# Patient Record
Sex: FEMALE | Race: WHITE | NOT HISPANIC OR LATINO | Employment: FULL TIME | URBAN - METROPOLITAN AREA
[De-identification: names, ages, dates, MRNs, and addresses within clinical notes are randomized per-mention and may not be internally consistent; named-entity substitution may affect disease eponyms.]

---

## 2023-02-10 PROBLEM — R26.81 GAIT INSTABILITY: Status: ACTIVE | Noted: 2023-02-10

## 2023-02-10 PROBLEM — M25.551 RIGHT HIP PAIN: Status: ACTIVE | Noted: 2023-02-10

## 2023-02-10 PROBLEM — M20.5X9 ACQUIRED CLAW TOE: Status: ACTIVE | Noted: 2023-02-10

## 2023-02-10 PROBLEM — E28.39 HYPOESTROGENISM: Status: ACTIVE | Noted: 2023-02-10

## 2023-02-10 PROBLEM — M21.611 HALLUX VALGUS WITH BUNIONS OF RIGHT FOOT: Status: ACTIVE | Noted: 2023-02-10

## 2023-02-10 PROBLEM — E03.9 HYPOTHYROIDISM: Status: ACTIVE | Noted: 2023-02-10

## 2023-02-10 PROBLEM — N39.0 UTI (URINARY TRACT INFECTION): Status: ACTIVE | Noted: 2023-02-10

## 2023-02-10 PROBLEM — M21.079 VALGUS DEFORMITY, NOT ELSEWHERE CLASSIFIED, UNSPECIFIED ANKLE: Status: ACTIVE | Noted: 2023-02-10

## 2023-02-10 PROBLEM — M21.42 PES PLANUS OF LEFT FOOT: Status: ACTIVE | Noted: 2023-02-10

## 2023-02-10 PROBLEM — D64.9 ABSOLUTE ANEMIA: Status: ACTIVE | Noted: 2023-02-10

## 2023-02-10 PROBLEM — L23.7 CONTACT DERMATITIS DUE TO POISON IVY: Status: ACTIVE | Noted: 2023-02-10

## 2023-02-10 PROBLEM — M17.12 PRIMARY OSTEOARTHRITIS OF LEFT KNEE: Status: ACTIVE | Noted: 2023-02-10

## 2023-02-10 PROBLEM — R09.89 DECREASED PEDAL PULSES: Status: ACTIVE | Noted: 2023-02-10

## 2023-02-10 PROBLEM — M21.6X9: Status: ACTIVE | Noted: 2023-02-10

## 2023-02-10 PROBLEM — F51.04 CHRONIC INSOMNIA: Status: ACTIVE | Noted: 2023-02-10

## 2023-02-10 PROBLEM — N39.3 STRESS INCONTINENCE IN FEMALE: Status: ACTIVE | Noted: 2023-02-10

## 2023-02-10 PROBLEM — I10 HYPERTENSION: Status: ACTIVE | Noted: 2023-02-10

## 2023-02-10 PROBLEM — M20.11 HALLUX VALGUS WITH BUNIONS OF RIGHT FOOT: Status: ACTIVE | Noted: 2023-02-10

## 2023-02-10 PROBLEM — M25.572 LEFT ANKLE PAIN: Status: ACTIVE | Noted: 2023-02-10

## 2023-02-10 PROBLEM — M25.562 LEFT KNEE PAIN: Status: ACTIVE | Noted: 2023-02-10

## 2023-02-10 PROBLEM — N90.89 VULVAR IRRITATION: Status: ACTIVE | Noted: 2023-02-10

## 2023-02-10 PROBLEM — E78.5 HYPERLIPIDEMIA: Status: ACTIVE | Noted: 2023-02-10

## 2023-02-10 PROBLEM — R93.1 AGATSTON CAC SCORE, <100: Status: ACTIVE | Noted: 2023-02-10

## 2023-02-10 PROBLEM — N81.4 PROLAPSE, UTEROVAGINAL: Status: ACTIVE | Noted: 2023-02-10

## 2023-02-10 PROBLEM — M21.40 FLAT FOOT: Status: ACTIVE | Noted: 2023-02-10

## 2023-02-10 PROBLEM — M19.079 ARTHRITIS, MIDFOOT: Status: ACTIVE | Noted: 2023-02-10

## 2023-02-10 PROBLEM — N39.46 URGE AND STRESS INCONTINENCE: Status: ACTIVE | Noted: 2023-02-10

## 2023-02-10 PROBLEM — N89.8 VAGINAL EROSION SECONDARY TO PESSARY USE (CMS-HCC): Status: ACTIVE | Noted: 2023-02-10

## 2023-02-10 PROBLEM — M79.672 LEFT FOOT PAIN: Status: ACTIVE | Noted: 2023-02-10

## 2023-02-10 PROBLEM — F32.1 MODERATE SINGLE CURRENT EPISODE OF MAJOR DEPRESSIVE DISORDER (MULTI): Status: ACTIVE | Noted: 2023-02-10

## 2023-02-10 PROBLEM — E55.9 HYPOVITAMINOSIS D: Status: ACTIVE | Noted: 2023-02-10

## 2023-02-10 PROBLEM — T83.89XA VAGINAL EROSION SECONDARY TO PESSARY USE (CMS-HCC): Status: ACTIVE | Noted: 2023-02-10

## 2023-02-10 RX ORDER — LEVOTHYROXINE SODIUM 75 UG/1
75 TABLET ORAL DAILY
COMMUNITY
Start: 2013-11-16 | End: 2023-06-19 | Stop reason: SDUPTHER

## 2023-02-10 RX ORDER — TRAZODONE HYDROCHLORIDE 150 MG/1
150 TABLET ORAL NIGHTLY
COMMUNITY
Start: 2021-12-04 | End: 2023-06-19 | Stop reason: SDUPTHER

## 2023-02-10 RX ORDER — LISINOPRIL 20 MG/1
20 TABLET ORAL DAILY
COMMUNITY
Start: 2013-11-16 | End: 2023-03-07 | Stop reason: DRUGHIGH

## 2023-02-10 RX ORDER — ROSUVASTATIN CALCIUM 20 MG/1
20 TABLET, COATED ORAL NIGHTLY
COMMUNITY
Start: 2019-04-23 | End: 2023-03-07 | Stop reason: SINTOL

## 2023-02-10 RX ORDER — FENOFIBRIC ACID 45 MG/1
45 CAPSULE, DELAYED RELEASE ORAL DAILY
COMMUNITY
Start: 2013-11-16 | End: 2023-07-10 | Stop reason: DRUGHIGH

## 2023-02-22 LAB
ALBUMIN (G/DL) IN SER/PLAS: 4.2 G/DL (ref 3.4–5)
ANION GAP IN SER/PLAS: 11 MMOL/L (ref 10–20)
CALCIUM (MG/DL) IN SER/PLAS: 9.7 MG/DL (ref 8.6–10.6)
CARBON DIOXIDE, TOTAL (MMOL/L) IN SER/PLAS: 29 MMOL/L (ref 21–32)
CHLORIDE (MMOL/L) IN SER/PLAS: 104 MMOL/L (ref 98–107)
CREATININE (MG/DL) IN SER/PLAS: 0.74 MG/DL (ref 0.5–1.05)
GFR FEMALE: 86 ML/MIN/1.73M2
GLUCOSE (MG/DL) IN SER/PLAS: 94 MG/DL (ref 74–99)
PHOSPHATE (MG/DL) IN SER/PLAS: 4.1 MG/DL (ref 2.5–4.9)
POTASSIUM (MMOL/L) IN SER/PLAS: 3.9 MMOL/L (ref 3.5–5.3)
SODIUM (MMOL/L) IN SER/PLAS: 140 MMOL/L (ref 136–145)
UREA NITROGEN (MG/DL) IN SER/PLAS: 19 MG/DL (ref 6–23)

## 2023-03-07 ENCOUNTER — OFFICE VISIT (OUTPATIENT)
Dept: PRIMARY CARE | Facility: CLINIC | Age: 73
End: 2023-03-07
Payer: COMMERCIAL

## 2023-03-07 VITALS
HEIGHT: 65 IN | DIASTOLIC BLOOD PRESSURE: 69 MMHG | OXYGEN SATURATION: 98 % | HEART RATE: 59 BPM | BODY MASS INDEX: 25.18 KG/M2 | SYSTOLIC BLOOD PRESSURE: 146 MMHG

## 2023-03-07 DIAGNOSIS — F32.1 MODERATE SINGLE CURRENT EPISODE OF MAJOR DEPRESSIVE DISORDER (MULTI): ICD-10-CM

## 2023-03-07 DIAGNOSIS — E78.2 MIXED HYPERLIPIDEMIA: ICD-10-CM

## 2023-03-07 DIAGNOSIS — E03.8 OTHER SPECIFIED HYPOTHYROIDISM: ICD-10-CM

## 2023-03-07 DIAGNOSIS — F51.04 CHRONIC INSOMNIA: ICD-10-CM

## 2023-03-07 DIAGNOSIS — M17.12 PRIMARY OSTEOARTHRITIS OF LEFT KNEE: ICD-10-CM

## 2023-03-07 DIAGNOSIS — R93.1 AGATSTON CAC SCORE, <100: Primary | ICD-10-CM

## 2023-03-07 DIAGNOSIS — E55.9 HYPOVITAMINOSIS D: ICD-10-CM

## 2023-03-07 DIAGNOSIS — I10 PRIMARY HYPERTENSION: ICD-10-CM

## 2023-03-07 PROCEDURE — 99214 OFFICE O/P EST MOD 30 MIN: CPT | Performed by: FAMILY MEDICINE

## 2023-03-07 PROCEDURE — 1036F TOBACCO NON-USER: CPT | Performed by: FAMILY MEDICINE

## 2023-03-07 PROCEDURE — 3077F SYST BP >= 140 MM HG: CPT | Performed by: FAMILY MEDICINE

## 2023-03-07 PROCEDURE — 1159F MED LIST DOCD IN RCRD: CPT | Performed by: FAMILY MEDICINE

## 2023-03-07 PROCEDURE — 1160F RVW MEDS BY RX/DR IN RCRD: CPT | Performed by: FAMILY MEDICINE

## 2023-03-07 PROCEDURE — 3078F DIAST BP <80 MM HG: CPT | Performed by: FAMILY MEDICINE

## 2023-03-07 RX ORDER — LOVASTATIN 40 MG/1
40 TABLET ORAL DAILY
Qty: 30 TABLET | Refills: 5 | Status: SHIPPED | OUTPATIENT
Start: 2023-03-07 | End: 2023-04-25 | Stop reason: SINTOL

## 2023-03-07 RX ORDER — LISINOPRIL 40 MG/1
40 TABLET ORAL DAILY
COMMUNITY
End: 2023-05-03 | Stop reason: SDUPTHER

## 2023-03-07 RX ORDER — MELOXICAM 7.5 MG/1
7.5 TABLET ORAL DAILY PRN
Qty: 30 TABLET | Refills: 0 | Status: SHIPPED | OUTPATIENT
Start: 2023-03-07 | End: 2023-04-06

## 2023-03-07 ASSESSMENT — PATIENT HEALTH QUESTIONNAIRE - PHQ9
SUM OF ALL RESPONSES TO PHQ9 QUESTIONS 1 AND 2: 0
1. LITTLE INTEREST OR PLEASURE IN DOING THINGS: NOT AT ALL
2. FEELING DOWN, DEPRESSED OR HOPELESS: NOT AT ALL

## 2023-03-07 ASSESSMENT — ENCOUNTER SYMPTOMS
LOSS OF SENSATION IN FEET: 0
DEPRESSION: 0
OCCASIONAL FEELINGS OF UNSTEADINESS: 0

## 2023-03-07 NOTE — PROGRESS NOTES
"Subjective   Patient ID: Amanda Lovell is a 72 y.o. female who presents for a 1 month follow up for hypertension.     HPI   The patient reports that she has not been taking the increased dose of lisinopril because she got the doses mixed up, this is why her blood pressures are still elevated. She is also taking Trazodone for her insomnia. The patient states that after stopping Rosuvastatin her leg cramps have resolved. She is also taking Levothyroxine for her hypothyroidism. Her iron deficiency anemia and depression are both stable without any medications at this time. She adds that she got a steroid injection 6 weeks ago and this has helped with her left knee pain.    Review of Systems  Constitutional: No fever or chills  Cardiovascular: no chest pain, no palpitations and no syncope.   Respiratory: no cough, no shortness of breath during exertion and no shortness of breath at rest.   Gastrointestinal: no abdominal pain, no nausea and no vomiting.  Neuro: No Headache, no dizziness    Objective   /69 (BP Location: Left arm, Patient Position: Sitting, BP Cuff Size: Adult long)   Pulse 59   Ht 1.638 m (5' 4.5\")   SpO2 98%   BMI 25.18 kg/m²     Physical Exam  Constitutional: Alert and in no acute distress. Well developed, well nourished  Head and Face: Head and face: Normal.    Cardiovascular: Heart rate and rhythm were normal, normal S1 and S2. No peripheral edema.   Pulmonary: No respiratory distress. Clear bilateral breath sounds.  Musculoskeletal: Gait and station: Normal. Muscle strength/tone: Normal.   Skin: Normal skin color and pigmentation, normal skin turgor, and no rash.    Psychiatric: Judgment and insight: Intact. Mood and affect: Normal.    Assessment/Plan   Diagnoses and all orders for this visit:  Agatston CAC score, <100  Primary hypertension  -     Vitamin B12; Future  -     Follow Up In [facility] - PCP; Future  Other specified hypothyroidism  -     CBC; Future  -     TSH with " reflex to Free T4 if abnormal; Future  Mixed hyperlipidemia  -     lovastatin (Mevacor) 40 mg tablet; Take 1 tablet (40 mg) by mouth once daily.  -     Comprehensive Metabolic Panel; Future  -     Lipid Panel; Future  Moderate single current episode of major depressive disorder (CMS/HCC)  Hypovitaminosis D  -     Vitamin D, Total; Future  Chronic insomnia  Primary osteoarthritis of left knee  -     meloxicam (Mobic) 7.5 mg tablet; Take 1 tablet (7.5 mg) by mouth once daily as needed for moderate pain (4 - 6).        Dear Amanda Lovell     It was my pleasure to take care of you today in the office. Below are the things we discussed today:    1. Hypothyroidism: Continue levothyroxine.    2. Hypertension: Poorly controlled. Increase lisinopril to 40 mg.     3. Hyperlipidemia: Start Lovastatin. Stop Rosuvastatin and Continue Fenofibrate    4. Insomnia: Continue Trazodone.     5. Iron deficiency anemia: Stable. We will continue to monitor.    6. Left knee pain: Take Meloxicam for pain as needed. Follow up with ortho    Your yearly Physical is due in: July 2023.  When you call the office for your yearly Physical, please ask them to inform me to order your blood work, so that you can get the fasting blood work before your appointment and we can discuss the results at your physical.      Please call me if any questions arise from now until your next visit. I will call you after I am done seeing patients. A Doctor is always available by phone when the office is closed. Please feel free to call for help with any problem that you feel shouldn't wait until the office re-opens.     Kathryn Dunham MD

## 2023-03-18 ENCOUNTER — PATIENT MESSAGE (OUTPATIENT)
Dept: PRIMARY CARE | Facility: CLINIC | Age: 73
End: 2023-03-18
Payer: COMMERCIAL

## 2023-03-18 DIAGNOSIS — E78.2 MIXED HYPERLIPIDEMIA: Primary | ICD-10-CM

## 2023-03-20 RX ORDER — PRAVASTATIN SODIUM 10 MG/1
10 TABLET ORAL DAILY
Qty: 90 TABLET | Refills: 0 | Status: SHIPPED | OUTPATIENT
Start: 2023-03-20 | End: 2023-04-25 | Stop reason: SINTOL

## 2023-04-24 ENCOUNTER — PATIENT MESSAGE (OUTPATIENT)
Dept: PRIMARY CARE | Facility: CLINIC | Age: 73
End: 2023-04-24
Payer: COMMERCIAL

## 2023-04-24 DIAGNOSIS — E78.2 MIXED HYPERLIPIDEMIA: Primary | ICD-10-CM

## 2023-04-25 RX ORDER — EZETIMIBE 10 MG/1
10 TABLET ORAL DAILY
Qty: 90 TABLET | Refills: 0 | Status: SHIPPED | OUTPATIENT
Start: 2023-04-25 | End: 2023-07-10 | Stop reason: DRUGHIGH

## 2023-05-03 DIAGNOSIS — I10 PRIMARY HYPERTENSION: ICD-10-CM

## 2023-05-03 RX ORDER — LISINOPRIL 40 MG/1
40 TABLET ORAL DAILY
Qty: 90 TABLET | Refills: 1 | Status: SHIPPED | OUTPATIENT
Start: 2023-05-03 | End: 2023-07-10 | Stop reason: SDUPTHER

## 2023-06-05 ENCOUNTER — LAB (OUTPATIENT)
Dept: LAB | Facility: LAB | Age: 73
End: 2023-06-05
Payer: COMMERCIAL

## 2023-06-05 DIAGNOSIS — I10 PRIMARY HYPERTENSION: ICD-10-CM

## 2023-06-05 DIAGNOSIS — E78.2 MIXED HYPERLIPIDEMIA: ICD-10-CM

## 2023-06-05 DIAGNOSIS — E55.9 HYPOVITAMINOSIS D: ICD-10-CM

## 2023-06-05 DIAGNOSIS — E03.8 OTHER SPECIFIED HYPOTHYROIDISM: ICD-10-CM

## 2023-06-05 LAB
ALANINE AMINOTRANSFERASE (SGPT) (U/L) IN SER/PLAS: 14 U/L (ref 7–45)
ALBUMIN (G/DL) IN SER/PLAS: 3.8 G/DL (ref 3.4–5)
ALKALINE PHOSPHATASE (U/L) IN SER/PLAS: 45 U/L (ref 33–136)
ANION GAP IN SER/PLAS: 12 MMOL/L (ref 10–20)
ASPARTATE AMINOTRANSFERASE (SGOT) (U/L) IN SER/PLAS: 20 U/L (ref 9–39)
BILIRUBIN TOTAL (MG/DL) IN SER/PLAS: 0.7 MG/DL (ref 0–1.2)
CALCIDIOL (25 OH VITAMIN D3) (NG/ML) IN SER/PLAS: 50 NG/ML
CALCIUM (MG/DL) IN SER/PLAS: 9 MG/DL (ref 8.6–10.3)
CARBON DIOXIDE, TOTAL (MMOL/L) IN SER/PLAS: 26 MMOL/L (ref 21–32)
CHLORIDE (MMOL/L) IN SER/PLAS: 106 MMOL/L (ref 98–107)
CHOLESTEROL (MG/DL) IN SER/PLAS: 213 MG/DL (ref 0–199)
CHOLESTEROL IN HDL (MG/DL) IN SER/PLAS: 66.8 MG/DL
CHOLESTEROL/HDL RATIO: 3.2
COBALAMIN (VITAMIN B12) (PG/ML) IN SER/PLAS: 577 PG/ML (ref 211–911)
CREATININE (MG/DL) IN SER/PLAS: 0.67 MG/DL (ref 0.5–1.05)
ERYTHROCYTE DISTRIBUTION WIDTH (RATIO) BY AUTOMATED COUNT: 13.2 % (ref 11.5–14.5)
ERYTHROCYTE MEAN CORPUSCULAR HEMOGLOBIN CONCENTRATION (G/DL) BY AUTOMATED: 31.3 G/DL (ref 32–36)
ERYTHROCYTE MEAN CORPUSCULAR VOLUME (FL) BY AUTOMATED COUNT: 87 FL (ref 80–100)
ERYTHROCYTES (10*6/UL) IN BLOOD BY AUTOMATED COUNT: 4.73 X10E12/L (ref 4–5.2)
GFR FEMALE: >90 ML/MIN/1.73M2
GLUCOSE (MG/DL) IN SER/PLAS: 84 MG/DL (ref 74–99)
HEMATOCRIT (%) IN BLOOD BY AUTOMATED COUNT: 41.2 % (ref 36–46)
HEMOGLOBIN (G/DL) IN BLOOD: 12.9 G/DL (ref 12–16)
LDL: 122 MG/DL (ref 0–99)
LEUKOCYTES (10*3/UL) IN BLOOD BY AUTOMATED COUNT: 3.9 X10E9/L (ref 4.4–11.3)
PLATELETS (10*3/UL) IN BLOOD AUTOMATED COUNT: 288 X10E9/L (ref 150–450)
POTASSIUM (MMOL/L) IN SER/PLAS: 3.6 MMOL/L (ref 3.5–5.3)
PROTEIN TOTAL: 6.4 G/DL (ref 6.4–8.2)
SODIUM (MMOL/L) IN SER/PLAS: 140 MMOL/L (ref 136–145)
THYROTROPIN (MIU/L) IN SER/PLAS BY DETECTION LIMIT <= 0.05 MIU/L: 0.5 MIU/L (ref 0.44–3.98)
TRIGLYCERIDE (MG/DL) IN SER/PLAS: 119 MG/DL (ref 0–149)
UREA NITROGEN (MG/DL) IN SER/PLAS: 13 MG/DL (ref 6–23)
VLDL: 24 MG/DL (ref 0–40)

## 2023-06-05 PROCEDURE — 82607 VITAMIN B-12: CPT

## 2023-06-05 PROCEDURE — 85027 COMPLETE CBC AUTOMATED: CPT

## 2023-06-05 PROCEDURE — 36415 COLL VENOUS BLD VENIPUNCTURE: CPT

## 2023-06-05 PROCEDURE — 80053 COMPREHEN METABOLIC PANEL: CPT

## 2023-06-05 PROCEDURE — 80061 LIPID PANEL: CPT

## 2023-06-05 PROCEDURE — 82306 VITAMIN D 25 HYDROXY: CPT

## 2023-06-05 PROCEDURE — 84443 ASSAY THYROID STIM HORMONE: CPT

## 2023-06-19 DIAGNOSIS — E03.8 OTHER SPECIFIED HYPOTHYROIDISM: ICD-10-CM

## 2023-06-19 DIAGNOSIS — F51.04 CHRONIC INSOMNIA: ICD-10-CM

## 2023-06-19 RX ORDER — TRAZODONE HYDROCHLORIDE 150 MG/1
150 TABLET ORAL NIGHTLY
Qty: 30 TABLET | Refills: 0 | Status: SHIPPED | OUTPATIENT
Start: 2023-06-19 | End: 2023-07-10 | Stop reason: SDUPTHER

## 2023-06-19 RX ORDER — LEVOTHYROXINE SODIUM 75 UG/1
75 TABLET ORAL EVERY MORNING
Qty: 30 TABLET | Refills: 0 | Status: SHIPPED | OUTPATIENT
Start: 2023-06-19 | End: 2023-07-10 | Stop reason: SDUPTHER

## 2023-07-10 ENCOUNTER — OFFICE VISIT (OUTPATIENT)
Dept: PRIMARY CARE | Facility: CLINIC | Age: 73
End: 2023-07-10
Payer: COMMERCIAL

## 2023-07-10 VITALS
BODY MASS INDEX: 25.83 KG/M2 | SYSTOLIC BLOOD PRESSURE: 152 MMHG | HEIGHT: 65 IN | HEART RATE: 62 BPM | WEIGHT: 155 LBS | DIASTOLIC BLOOD PRESSURE: 70 MMHG | OXYGEN SATURATION: 99 %

## 2023-07-10 DIAGNOSIS — R93.1 AGATSTON CAC SCORE, <100: ICD-10-CM

## 2023-07-10 DIAGNOSIS — E03.8 OTHER SPECIFIED HYPOTHYROIDISM: ICD-10-CM

## 2023-07-10 DIAGNOSIS — F51.04 CHRONIC INSOMNIA: ICD-10-CM

## 2023-07-10 DIAGNOSIS — Z78.0 ASYMPTOMATIC MENOPAUSAL STATE: ICD-10-CM

## 2023-07-10 DIAGNOSIS — Z00.00 ANNUAL PHYSICAL EXAM: Primary | ICD-10-CM

## 2023-07-10 DIAGNOSIS — Z12.31 ENCOUNTER FOR SCREENING MAMMOGRAM FOR BREAST CANCER: ICD-10-CM

## 2023-07-10 DIAGNOSIS — F32.1 MODERATE SINGLE CURRENT EPISODE OF MAJOR DEPRESSIVE DISORDER (MULTI): ICD-10-CM

## 2023-07-10 DIAGNOSIS — Z23 ENCOUNTER FOR IMMUNIZATION: ICD-10-CM

## 2023-07-10 DIAGNOSIS — I10 PRIMARY HYPERTENSION: ICD-10-CM

## 2023-07-10 DIAGNOSIS — G25.81 RLS (RESTLESS LEGS SYNDROME): ICD-10-CM

## 2023-07-10 DIAGNOSIS — E78.2 MIXED HYPERLIPIDEMIA: ICD-10-CM

## 2023-07-10 PROCEDURE — 3078F DIAST BP <80 MM HG: CPT | Performed by: FAMILY MEDICINE

## 2023-07-10 PROCEDURE — 90677 PCV20 VACCINE IM: CPT | Performed by: FAMILY MEDICINE

## 2023-07-10 PROCEDURE — 1159F MED LIST DOCD IN RCRD: CPT | Performed by: FAMILY MEDICINE

## 2023-07-10 PROCEDURE — 90472 IMMUNIZATION ADMIN EACH ADD: CPT | Performed by: FAMILY MEDICINE

## 2023-07-10 PROCEDURE — 1170F FXNL STATUS ASSESSED: CPT | Performed by: FAMILY MEDICINE

## 2023-07-10 PROCEDURE — 1160F RVW MEDS BY RX/DR IN RCRD: CPT | Performed by: FAMILY MEDICINE

## 2023-07-10 PROCEDURE — 3077F SYST BP >= 140 MM HG: CPT | Performed by: FAMILY MEDICINE

## 2023-07-10 PROCEDURE — 90715 TDAP VACCINE 7 YRS/> IM: CPT | Performed by: FAMILY MEDICINE

## 2023-07-10 PROCEDURE — 90471 IMMUNIZATION ADMIN: CPT | Performed by: FAMILY MEDICINE

## 2023-07-10 PROCEDURE — 99397 PER PM REEVAL EST PAT 65+ YR: CPT | Performed by: FAMILY MEDICINE

## 2023-07-10 PROCEDURE — 1125F AMNT PAIN NOTED PAIN PRSNT: CPT | Performed by: FAMILY MEDICINE

## 2023-07-10 PROCEDURE — 1036F TOBACCO NON-USER: CPT | Performed by: FAMILY MEDICINE

## 2023-07-10 RX ORDER — ROPINIROLE 0.5 MG/1
0.5 TABLET, FILM COATED ORAL NIGHTLY
Qty: 90 TABLET | Refills: 0 | Status: SHIPPED | OUTPATIENT
Start: 2023-07-10 | End: 2023-07-31 | Stop reason: SDUPTHER

## 2023-07-10 RX ORDER — TRAZODONE HYDROCHLORIDE 150 MG/1
150 TABLET ORAL NIGHTLY
Qty: 90 TABLET | Refills: 0 | Status: SHIPPED | OUTPATIENT
Start: 2023-07-10 | End: 2023-07-10

## 2023-07-10 RX ORDER — LISINOPRIL 40 MG/1
40 TABLET ORAL DAILY
Qty: 90 TABLET | Refills: 0 | Status: SHIPPED | OUTPATIENT
Start: 2023-07-10 | End: 2023-07-10

## 2023-07-10 RX ORDER — LEVOTHYROXINE SODIUM 75 UG/1
75 TABLET ORAL EVERY MORNING
Qty: 90 TABLET | Refills: 0 | Status: SHIPPED | OUTPATIENT
Start: 2023-07-10 | End: 2023-07-10

## 2023-07-10 RX ORDER — ROSUVASTATIN CALCIUM 10 MG/1
10 TABLET, COATED ORAL DAILY
Qty: 90 TABLET | Refills: 0 | Status: SHIPPED | OUTPATIENT
Start: 2023-07-10 | End: 2023-07-10

## 2023-07-10 ASSESSMENT — ENCOUNTER SYMPTOMS
OCCASIONAL FEELINGS OF UNSTEADINESS: 0
DEPRESSION: 0
LOSS OF SENSATION IN FEET: 0

## 2023-07-10 ASSESSMENT — COLUMBIA-SUICIDE SEVERITY RATING SCALE - C-SSRS
6. HAVE YOU EVER DONE ANYTHING, STARTED TO DO ANYTHING, OR PREPARED TO DO ANYTHING TO END YOUR LIFE?: NO
2. HAVE YOU ACTUALLY HAD ANY THOUGHTS OF KILLING YOURSELF?: NO
1. IN THE PAST MONTH, HAVE YOU WISHED YOU WERE DEAD OR WISHED YOU COULD GO TO SLEEP AND NOT WAKE UP?: NO

## 2023-07-10 ASSESSMENT — ACTIVITIES OF DAILY LIVING (ADL)
TAKING MEDICATION: INDEPENDENT
PREPARING MEALS: INDEPENDENT
NEEDS ASSISTANCE WITH FOOD: INDEPENDENT
JUDGMENT_ADEQUATE_SAFELY_COMPLETE_DAILY_ACTIVITIES: YES
DRESSING: INDEPENDENT
ADEQUATE_TO_COMPLETE_ADL: YES
BATHING: INDEPENDENT
GROCERY SHOPPING: INDEPENDENT
MANAGING FINANCES: INDEPENDENT
USING TELEPHONE: INDEPENDENT
EATING: INDEPENDENT
FEEDING: INDEPENDENT
USING TRANSPORTATION: INDEPENDENT
TOILETING: INDEPENDENT
DOING HOUSEWORK: INDEPENDENT
PILL BOX USED: YES
STIL DRIVING: YES

## 2023-07-10 ASSESSMENT — PATIENT HEALTH QUESTIONNAIRE - PHQ9
1. LITTLE INTEREST OR PLEASURE IN DOING THINGS: NOT AT ALL
SUM OF ALL RESPONSES TO PHQ9 QUESTIONS 1 AND 2: 0
2. FEELING DOWN, DEPRESSED OR HOPELESS: NOT AT ALL

## 2023-07-10 NOTE — PROGRESS NOTES
"Subjective   Patient ID: Amanda Lovell is a 73 y.o. female who presents for Medicare Annual Wellness Visit Subsequent.      HPI   The patient reports that she is currently taking lisinopril for her hypertension, levothyroxine for hypothyroidism and Trazodone for insomnia. She is taking these medications as prescribed and is tolerating them well. She mentions that she is now taking Zetia and Fenofibrate for her hyperlipidemia and is still experiencing leg pain after switching medications. Her leukopenia is stable at this time and her home blood pressure readings are normally in he 130s systolic.    Review of Systems  Constitutional: No fever or chills, No Night Sweats  Eyes: No Blurry Vision or Eye sight problems  ENT: No Nasal Discharge, Hoarseness, sore throat  Cardiovascular: no chest pain, no palpitations and no syncope.   Respiratory: no cough, no shortness of breath during exertion and no shortness of breath at rest.   Gastrointestinal: no abdominal pain, no nausea and no vomiting.   : No vaginal discharge, burning with urination, no blood in urine or stools  Skin: No Skin rashes or Lesions  Neuro: No Headache, no dizziness or Numbness or tingling  Psych: + sleeping problems. No Anxiety and depression.  Heme: + Easy bleeding and brusing.     Objective   /70   Pulse 62   Ht 1.638 m (5' 4.5\")   Wt 70.3 kg (155 lb)   SpO2 99%   BMI 26.19 kg/m²     Physical Exam  Constitutional: Alert and in no acute distress. Well developed, well nourished.   Head and Face: Head and face: Normal.    Eyes: Normal external exam. Pupils were equal in size, round, reactive to light (PERRL) with normal accommodation and extraocular movements intact (EOMI).   Ears, Nose, Mouth, and Throat: External inspection of ears and nose: Normal.  Hearing: Normal.  Nasal mucosa, septum, and turbinates: Normal.  Lips, teeth, and gums: Normal.  Oropharynx: Normal.   Neck: No neck mass was observed. Supple. Thyroid not enlarged and " there were no palpable thyroid nodules.   Cardiovascular: Heart rate and rhythm were normal, normal S1 and S2. Pedal pulses: Normal. No peripheral edema.   Pulmonary: No respiratory distress. Clear bilateral breath sounds.   Abdomen: Soft nontender; no abdominal mass palpated. Normal bowel sounds. No organomegaly.   Musculoskeletal: No joint swelling seen, normal movements of all extremities. Range of motion: Normal.  Muscle strength/tone: Normal.    Skin: Normal skin color and pigmentation, normal skin turgor, and no rash.   Neurologic: Deep tendon reflexes were 2+ and symmetric.   Psychiatric: Judgment and insight: Intact. Mood and affect: Normal.  Lymphatic: No cervical lymphadenopathy. Palpation of lymph nodes in axillae: Normal.  Palpation of lymph nodes in groin: Normal.    Lab Results   Component Value Date    WBC 3.9 (L) 06/05/2023    HGB 12.9 06/05/2023    HCT 41.2 06/05/2023     06/05/2023    CHOL 213 (H) 06/05/2023    TRIG 119 06/05/2023    HDL 66.8 06/05/2023    ALT 14 06/05/2023    AST 20 06/05/2023     06/05/2023    K 3.6 06/05/2023     06/05/2023    CREATININE 0.67 06/05/2023    BUN 13 06/05/2023    CO2 26 06/05/2023    TSH 0.50 06/05/2023    INR 1.0 05/16/2018       XR foot  Narrative: Interpreted By:  ETIENNE MAY MD  MRN: [id number]  Patient Name: MARICHUY BRISENO     STUDY:  FOOT; COMPLETE, MIN 3 VIEWS;  6/13/2023 2:40 pm     INDICATION:  pain  M25.572: Left ankle pain, unspecified chronicity M79.672: Left  foot pain.     COMPARISON:  09/27/2020.     ACCESSION NUMBER(S):  [id number]     ORDERING CLINICIAN:  SHAINA ST     FINDINGS:  Left foot, three views     There is moderate joint space narrowing with osteophytosis and  sclerosis in the ankle joint. Remote medial malleolar avulsion injury  present. Remote avulsion fracture at the dorsal aspect of the midfoot  likely arising from the q.day forms. There is severe midfoot  osteoarthritis as well. There is pes planus  deformity. There is no  acute fracture or dislocation.     There is subluxation/dislocation of the 5th PIP joint, similar to the  prior     Impression: Severe pes planus deformity. Severe midfoot and moderate ankle  osteoarthritis. Remote avulsion fracture at the dorsal aspect of the  midfoot. Redemonstration of subluxation/dislocated of the 5th  proximal interphalangeal joint  XR ankle  Narrative: Interpreted By:  ETIENNE MAY MD  MRN: [id number]  Patient Name: MARICHUY BRISENO     STUDY:  ANKLE, COMPLETE, MIN 3 VIEWS;  6/13/2023 2:40 pm     INDICATION:  pain  M25.572: Left ankle pain, unspecified chronicity M79.672: Left  foot pain.     COMPARISON:  09/27/2022     ACCESSION NUMBER(S):  [id number]     ORDERING CLINICIAN:  SHAINA ST     FINDINGS:  Left ankle, three views     There is moderate joint space narrowing with osteophytosis and  sclerosis in the ankle joint. Remote medial malleolar avulsion injury  present. Remote avulsion fracture at the dorsal aspect of the midfoot  likely arising from the q.day forms. There is severe midfoot  osteoarthritis as well. There is pes planus deformity. There is no  acute fracture or dislocation. Subluxation is present at the 4th and  5th interphalangeal joints however partially visualized with this  study.     Impression:    Severe pes planus deformity. Severe midfoot and moderate ankle  osteoarthritis. Remote avulsion fracture at the dorsal aspect of the  midfoot. No acute abnormality seen      Assessment/Plan   Diagnoses and all orders for this visit:  Annual physical exam  Primary hypertension  -     Follow Up In [facility] - PCP  -     lisinopril 40 mg tablet; Take 1 tablet (40 mg) by mouth once daily.  Asymptomatic menopausal state  -     XR DEXA bone density; Future  Encounter for screening mammogram for breast cancer  -     BI mammo bilateral screening tomosynthesis; Future  Agatston CAC score, <100  Mixed hyperlipidemia  -     rosuvastatin (Crestor)  10 mg tablet; Take 1 tablet (10 mg) by mouth once daily.  -     Follow Up In [facility] - PCP - Established; Future  Other specified hypothyroidism  -     levothyroxine (Synthroid, Levoxyl) 75 mcg tablet; Take 1 tablet (75 mcg) by mouth once daily in the morning.  Chronic insomnia  -     traZODone (Desyrel) 150 mg tablet; Take 1 tablet (150 mg) by mouth once daily at bedtime.  Moderate single current episode of major depressive disorder (CMS/HCC)  RLS (restless legs syndrome)  -     rOPINIRole (Requip) 0.5 mg tablet; Take 1 tablet (0.5 mg) by mouth once daily at bedtime.        Dear Amanda Lovell     It was my pleasure to take care of you today in the office. Below are the things we discussed today:    1. 1. Immunizations: Yearly Flu shot is recommended. Please get in the fall         a: COVID: Please get booster in the fall          b: Tetanus: Given today          c: Shingrix: Up-to-date         d: Pneumovax: Up-to-date         e: Prevnar: Given today     2. Blood Work: Reviewed   3. Seen your dentist twice a year  4. Yearly Eye exam is recommended    5. BMI: Overweight  6: Diet recommendations:   Eat Clean, Try to have as many home cooked meals as possible  Avoid processed foods which contain excess calories, sugar, and sodium.    7. Exercise recommendations:   150 minutes a week to maintain your weight     If you have to loose weight, you need a better diet and exercise plan.     8. Supplements recommended:  a - Calcium 600 mg up to twice a day to get a total of 1200 mg. Each 8 oz of milk or yogurt or 1 oz of cheese, 1 Banana, 1 serving of green Leafy vegetable has about 300 mg of Calcium, so you may subtract that amount. Calcium citrate is the only acceptable supplement to take if you take an acid suppressing medication like Prilosec; otherwise Calcium carbonate is acceptable too (It can cause Constipation).   b - Vitamin D - 2000 IU daily     9. Please get your Living will / Advance directive  completed if you do not have one already. Please make sure our office has a copy of the latest one.     10. Colonoscopy: Uptodate. Last done in Oct 2021, repeat in Oct 2026   11. Mammogram: Uptodate, ordered for Dec 2023   12. PAP: Not indicated   13. DEXA: Up-to-date. Ordered   14: Skin Check: Please see Dermatology once a year for a Skin Check.     15. Hypertension: Continue lisinopril. Advised the patient to check ambulatory blood pressures at home at bed time and bring in a log of readings to her next visit.    16. Hypothyroidism: Continue Levothyroxine.     17. Insomnia: Continue Trazodone.    18. Hyperlipidemia: Stop Zetia and Fenofibrate. Advised the patient that if she is still experiencing pain after stopping medications she will start Requip for restless leg syndrome and restart Rosuvastatin. Start Requip, take 1/2 pill at nights.    19. Leukopenia: Stable.     Follow up in 3 months.     Follow up in one year for a Physical. Please call the office before your Physical to see if you need blood work completed prior to your physical.     Please call me if any questions arise from now until your next visit. I will call you after I am done seeing patients. A Doctor is always available by phone when the office is closed. Please feel free to call for help with any problem that you feel shouldn't wait until the office re-opens.     Scribe Attestation  By signing my name below, Miranda WOLFE Scribe   attest that this documentation has been prepared under the direction and in the presence of Kathryn Dunham MD.

## 2023-07-31 ENCOUNTER — PATIENT MESSAGE (OUTPATIENT)
Dept: PRIMARY CARE | Facility: CLINIC | Age: 73
End: 2023-07-31
Payer: COMMERCIAL

## 2023-07-31 DIAGNOSIS — G25.81 RLS (RESTLESS LEGS SYNDROME): ICD-10-CM

## 2023-07-31 RX ORDER — ROPINIROLE 1 MG/1
1 TABLET, FILM COATED ORAL NIGHTLY
Qty: 90 TABLET | Refills: 0 | Status: SHIPPED | OUTPATIENT
Start: 2023-07-31 | End: 2023-07-31

## 2023-08-23 DIAGNOSIS — E78.2 MIXED HYPERLIPIDEMIA: ICD-10-CM

## 2023-08-23 RX ORDER — FENOFIBRIC ACID 45 MG/1
1 CAPSULE, DELAYED RELEASE ORAL DAILY
COMMUNITY
Start: 2013-11-16 | End: 2023-08-23 | Stop reason: SDUPTHER

## 2023-08-23 RX ORDER — FENOFIBRATE 48 MG/1
48 TABLET, FILM COATED ORAL
COMMUNITY
End: 2023-08-23 | Stop reason: SDUPTHER

## 2023-08-24 RX ORDER — FENOFIBRIC ACID 45 MG/1
45 CAPSULE, DELAYED RELEASE ORAL DAILY
Qty: 90 CAPSULE | Refills: 0 | Status: SHIPPED | OUTPATIENT
Start: 2023-08-24 | End: 2023-08-24

## 2023-09-13 ENCOUNTER — HOSPITAL ENCOUNTER (OUTPATIENT)
Dept: DATA CONVERSION | Facility: HOSPITAL | Age: 73
End: 2023-09-13

## 2023-09-13 DIAGNOSIS — Z12.31 ENCOUNTER FOR SCREENING MAMMOGRAM FOR MALIGNANT NEOPLASM OF BREAST: ICD-10-CM

## 2023-10-10 ENCOUNTER — OFFICE VISIT (OUTPATIENT)
Dept: ORTHOPEDIC SURGERY | Facility: CLINIC | Age: 73
End: 2023-10-10
Payer: COMMERCIAL

## 2023-10-10 ENCOUNTER — APPOINTMENT (OUTPATIENT)
Dept: PRIMARY CARE | Facility: CLINIC | Age: 73
End: 2023-10-10
Payer: COMMERCIAL

## 2023-10-10 ENCOUNTER — APPOINTMENT (OUTPATIENT)
Dept: ORTHOPEDIC SURGERY | Facility: CLINIC | Age: 73
End: 2023-10-10
Payer: COMMERCIAL

## 2023-10-10 DIAGNOSIS — M20.5X2 ACQUIRED CLAW TOE OF LEFT FOOT: Primary | ICD-10-CM

## 2023-10-10 DIAGNOSIS — M20.22 ACQUIRED HALLUX RIGIDUS OF LEFT FOOT: ICD-10-CM

## 2023-10-10 DIAGNOSIS — M21.6X2 ACQUIRED PRONATION DEFORMITY OF FOOT, LEFT: ICD-10-CM

## 2023-10-10 PROCEDURE — 1159F MED LIST DOCD IN RCRD: CPT | Performed by: STUDENT IN AN ORGANIZED HEALTH CARE EDUCATION/TRAINING PROGRAM

## 2023-10-10 PROCEDURE — 99212 OFFICE O/P EST SF 10 MIN: CPT | Performed by: STUDENT IN AN ORGANIZED HEALTH CARE EDUCATION/TRAINING PROGRAM

## 2023-10-10 PROCEDURE — 1160F RVW MEDS BY RX/DR IN RCRD: CPT | Performed by: STUDENT IN AN ORGANIZED HEALTH CARE EDUCATION/TRAINING PROGRAM

## 2023-10-10 PROCEDURE — 1036F TOBACCO NON-USER: CPT | Performed by: STUDENT IN AN ORGANIZED HEALTH CARE EDUCATION/TRAINING PROGRAM

## 2023-10-10 PROCEDURE — 1125F AMNT PAIN NOTED PAIN PRSNT: CPT | Performed by: STUDENT IN AN ORGANIZED HEALTH CARE EDUCATION/TRAINING PROGRAM

## 2023-10-10 ASSESSMENT — PAIN SCALES - GENERAL: PAINLEVEL_OUTOF10: 3

## 2023-10-10 ASSESSMENT — PAIN DESCRIPTION - DESCRIPTORS: DESCRIPTORS: BURNING

## 2023-10-10 ASSESSMENT — PAIN - FUNCTIONAL ASSESSMENT: PAIN_FUNCTIONAL_ASSESSMENT: 0-10

## 2023-10-10 NOTE — PROGRESS NOTES
ORTHOPAEDIC SURGERY OUTPATIENT PROGRESS NOTE    Chief Complaint:  Left foot pain    History Of Present Illness  73-year-old female well-known to me at this time with a history of complex bilateral lower extremity foot deformities including mild left valgus tibiotalar osteoarthritis wear pattern with pes planus foot posture, pronation and abduction deformity of the foot with 2 through 5 claw toe deformities with hallux rigidus.  Patient has been symptomatically managing the majority of her symptoms with combination of  and toe sleeves.  Recently she has had increased burning pain, 3 out of 10 on the dorsum of her claw toes.  She has been using wool in her socks to offload  her forefoot pain with some relief.  She has continued hiking and walking and is overall quite satisfied with her current level of symptoms.    She would like to continue to avoid surgery.     Past Medical History  Past Medical History:   Diagnosis Date    Encounter for fitting and adjustment of other specified devices 07/22/2018    Pessary maintenance    Encounter for general adult medical examination without abnormal findings 06/14/2021    Preventative health care    Other abnormal glucose 12/16/2016    Elevated glucose    Other specified complication of genitourinary prosthetic devices, implants and grafts, initial encounter (CMS/[facility]) 07/22/2018    Vaginal erosion secondary to pessary use, initial encounter    Pain in right foot 11/06/2017    Foot pain, bilateral    Pain in unspecified foot 06/30/2021    Foot pain    Personal history of other diseases of the circulatory system 11/18/2016    History of abnormal electrocardiography    Personal history of other diseases of the female genital tract 06/04/2020    History of uterine prolapse    Personal history of other diseases of the female genital tract 05/12/2016    History of postmenopausal bleeding    Personal history of other diseases of the nervous system and sense organs 03/22/2017     History of bacterial conjunctivitis    Personal history of other diseases of the respiratory system 11/19/2021    History of sore throat    Personal history of other diseases of urinary system 07/26/2018    History of hematuria    Personal history of other endocrine, nutritional and metabolic disease     History of hypothyroidism    Personal history of other specified conditions 02/21/2019    History of dysuria    Personal history of urinary (tract) infections 02/21/2019    History of urinary tract infection    Postmenopausal atrophic vaginitis 06/14/2021    Vaginal atrophy    Urge incontinence 06/11/2019    Urge incontinence       Surgical History  Recent Surgeries in Orthopaedic Surgery            No cases to display             Social History  Social History     Socioeconomic History    Marital status:      Spouse name: Not on file    Number of children: 2    Years of education: Not on file    Highest education level: Not on file   Occupational History    Not on file   Tobacco Use    Smoking status: Never    Smokeless tobacco: Never   Substance and Sexual Activity    Alcohol use: Never    Drug use: Never    Sexual activity: Not Currently   Other Topics Concern    Not on file   Social History Narrative    Not on file     Social Determinants of Health     Financial Resource Strain: Not on file   Food Insecurity: Not on file   Transportation Needs: Not on file   Physical Activity: Not on file   Stress: Not on file   Social Connections: Not on file   Intimate Partner Violence: Not on file   Housing Stability: Not on file       Family History  Family History   Problem Relation Name Age of Onset    Other (hamman  rich disease) Mother      Pancreatic cancer Father      Hyperlipidemia Father      Hypertension Father      Diabetes Other paternal aunt     Other (neoplasm of breast) Cousin          Allergies  No Known Allergies    Review of Systems  REVIEW OF SYSTEMS  Constitutional: no unplanned weight  loss  Psychiatric: no suicidal ideation  ENT: no vision changes, no sinus problems  Pulmonary: no shortness of breath during office visit today  Lymphatic: no enlarged lymph nodes  Cardiovascular: no chest pain or shortness of breath during office visit today  Gastrointestinal: no stomach problems  Genitourinary: no dysuria   Skin: no rashes  Endocrine: no thyroid problems  Neurological: no headache, no numbness  Hematological: no easy bruising  Musculoskeletal: Left foot pain     Physical Exam  PHYSICAL EXAMINATION  Constitutional Exam: well developed and well nourished  Psychiatric Exam: alert and oriented, appropriate mood and behavior  Eye Exam: EOMI  Pulmonary Exam: breathing non-labored, no apparent distress  Lymphatic exam: no appreciable lymphadenopathy in the lower extremities  Cardiovascular exam: RRR to peripheral palpation, DP pulses 2+, PT 2+, toes are pink with good capillary refill, no pitting edema  Skin exam: no open lesions, rashes, abrasions or ulcerations  Neurological exam: sensation to light touch intact in both lower extremities in peripheral and dermatomal distributions (except for any abnormalities noted in musculoskeletal exam)    Musculoskeletal exam: Left lower extremity examination.  Patient has obvious 2 through 5 rigid claw toe deformities with dorsal callus at the PIPJ.  She has a prominent plantar callus underlying the medial column, nontender to palpation.  There is no obvious ulceration or erythema.  She is focally tender to palpation about the dorsum of the 2 through 5 toes.  Patient has mild anterolateral ankle effusion.  She has mild hyperextension deformity of 2 through 5 claw toes.  Patient is pain-free and supple ankle ROM from approximate 10 degrees shy of neutral dorsiflexion to 40 degrees of plantarflexion.  Patient has restricted but painless subtalar joint ROM.  Midtarsal joint ROM is supple and pain-free, able to correct her abduction deformity in the coronal plane.   Patient has greater than physiologic hindfoot valgus with pes planus arch posture and forefoot deformities as described above.  Patient has sensation intact to light touch grossly in saphenous, sural, superficial peroneal, deep peroneal and tibial nerve distributions.  Patient has 5 out of 5 strength in plantarflexion, dorsiflexion and EHL.  She has 2+ DP/PT pulse palpated.     Last Recorded Vitals  There were no vitals taken for this visit.    Laboratory Results  No results found for this or any previous visit (from the past 24 hour(s)).     Radiology Results  No new imaging at this visit.    Assessment/Plan   73-year-old female who in my impression has left progressively collapsing foot deformity with mild left tibiotalar osteoarthritis and pes planus foot posture, pronation and abduction deformity of the foot with prominent 2 through 5 rigid claw toe deformities and hallux rigidus.  I have reviewed the diagnosis and treatment options extensively with the patient.  The patient reports that she is managing her symptoms well and does not wish to consider surgical management for this condition.  I certainly think this is reasonable as long as she does not have any skin ulceration or breakdown.  I reviewed with the patient that if the bad days begin to outnumber the good that she should consider surgical treatment for her claw toes and she may need correction of her PCFD as well.  I will plan to see the patient back on an as-needed basis.  I encouraged the patient to contact the office if she develops any new pain, worsening or changing symptoms or if she has any further questions.  Upon return, patient require 3 views weightbearing left foot and ankle with Sonido hindfoot alignment view.    Jermaine Rivas MD, BHARGAVI  Department of Orthopaedic Surgery  [facility]    The diagnosis and treatment plan were reviewed with the patient. All questions were answered. The patient  verbalized understanding of the treatment plan. There were no barriers to understanding identified.    Note dictated with [employer] software.  Completed without full type editing and sent to avoid delay.    eformities including mild left valgus tibiotalar osteoarthritis wear pattern with pes planus foot posture, pronation and abduction deformity of the foot with 2 through 5 claw toe deformities with hallux rigidus.

## 2023-10-23 DIAGNOSIS — E78.2 MIXED HYPERLIPIDEMIA: ICD-10-CM

## 2023-10-23 DIAGNOSIS — E03.8 OTHER SPECIFIED HYPOTHYROIDISM: ICD-10-CM

## 2023-10-23 DIAGNOSIS — I10 PRIMARY HYPERTENSION: ICD-10-CM

## 2023-10-23 DIAGNOSIS — F51.04 CHRONIC INSOMNIA: ICD-10-CM

## 2023-10-23 DIAGNOSIS — G25.81 RLS (RESTLESS LEGS SYNDROME): ICD-10-CM

## 2023-10-23 RX ORDER — LISINOPRIL 40 MG/1
40 TABLET ORAL DAILY
Qty: 30 TABLET | Refills: 0 | Status: SHIPPED | OUTPATIENT
Start: 2023-10-23 | End: 2023-11-13

## 2023-10-23 RX ORDER — TRAZODONE HYDROCHLORIDE 150 MG/1
150 TABLET ORAL NIGHTLY
Qty: 30 TABLET | Refills: 0 | Status: SHIPPED | OUTPATIENT
Start: 2023-10-23 | End: 2023-11-13 | Stop reason: SDUPTHER

## 2023-10-23 RX ORDER — LEVOTHYROXINE SODIUM 75 UG/1
75 TABLET ORAL EVERY MORNING
Qty: 30 TABLET | Refills: 0 | Status: SHIPPED | OUTPATIENT
Start: 2023-10-23 | End: 2023-11-13 | Stop reason: SDUPTHER

## 2023-10-23 RX ORDER — ROPINIROLE 1 MG/1
1 TABLET, FILM COATED ORAL NIGHTLY
Qty: 30 TABLET | Refills: 0 | Status: SHIPPED | OUTPATIENT
Start: 2023-10-23 | End: 2023-11-13 | Stop reason: SDUPTHER

## 2023-10-23 RX ORDER — ROSUVASTATIN CALCIUM 10 MG/1
10 TABLET, COATED ORAL DAILY
Qty: 30 TABLET | Refills: 0 | Status: SHIPPED | OUTPATIENT
Start: 2023-10-23 | End: 2023-11-13 | Stop reason: SDUPTHER

## 2023-10-24 ENCOUNTER — PHARMACY VISIT (OUTPATIENT)
Dept: PHARMACY | Facility: CLINIC | Age: 73
End: 2023-10-24
Payer: COMMERCIAL

## 2023-10-24 PROCEDURE — RXMED WILLOW AMBULATORY MEDICATION CHARGE

## 2023-11-13 ENCOUNTER — OFFICE VISIT (OUTPATIENT)
Dept: PRIMARY CARE | Facility: CLINIC | Age: 73
End: 2023-11-13
Payer: COMMERCIAL

## 2023-11-13 ENCOUNTER — PHARMACY VISIT (OUTPATIENT)
Dept: PHARMACY | Facility: CLINIC | Age: 73
End: 2023-11-13
Payer: COMMERCIAL

## 2023-11-13 VITALS
OXYGEN SATURATION: 97 % | SYSTOLIC BLOOD PRESSURE: 143 MMHG | DIASTOLIC BLOOD PRESSURE: 70 MMHG | BODY MASS INDEX: 26.49 KG/M2 | WEIGHT: 159 LBS | HEART RATE: 62 BPM | HEIGHT: 65 IN

## 2023-11-13 DIAGNOSIS — E78.2 MIXED HYPERLIPIDEMIA: ICD-10-CM

## 2023-11-13 DIAGNOSIS — E03.8 OTHER SPECIFIED HYPOTHYROIDISM: ICD-10-CM

## 2023-11-13 DIAGNOSIS — F51.04 CHRONIC INSOMNIA: ICD-10-CM

## 2023-11-13 DIAGNOSIS — D50.8 OTHER IRON DEFICIENCY ANEMIA: ICD-10-CM

## 2023-11-13 DIAGNOSIS — Z11.59 SCREENING FOR VIRAL DISEASE: ICD-10-CM

## 2023-11-13 DIAGNOSIS — I10 PRIMARY HYPERTENSION: Primary | ICD-10-CM

## 2023-11-13 DIAGNOSIS — G25.81 RLS (RESTLESS LEGS SYNDROME): ICD-10-CM

## 2023-11-13 PROCEDURE — 99214 OFFICE O/P EST MOD 30 MIN: CPT | Performed by: FAMILY MEDICINE

## 2023-11-13 PROCEDURE — 1159F MED LIST DOCD IN RCRD: CPT | Performed by: FAMILY MEDICINE

## 2023-11-13 PROCEDURE — 3078F DIAST BP <80 MM HG: CPT | Performed by: FAMILY MEDICINE

## 2023-11-13 PROCEDURE — 1160F RVW MEDS BY RX/DR IN RCRD: CPT | Performed by: FAMILY MEDICINE

## 2023-11-13 PROCEDURE — 3077F SYST BP >= 140 MM HG: CPT | Performed by: FAMILY MEDICINE

## 2023-11-13 PROCEDURE — 1125F AMNT PAIN NOTED PAIN PRSNT: CPT | Performed by: FAMILY MEDICINE

## 2023-11-13 PROCEDURE — RXMED WILLOW AMBULATORY MEDICATION CHARGE

## 2023-11-13 PROCEDURE — 1036F TOBACCO NON-USER: CPT | Performed by: FAMILY MEDICINE

## 2023-11-13 RX ORDER — ROSUVASTATIN CALCIUM 10 MG/1
10 TABLET, COATED ORAL DAILY
Qty: 90 TABLET | Refills: 2 | Status: SHIPPED | OUTPATIENT
Start: 2023-11-13

## 2023-11-13 RX ORDER — LEVOTHYROXINE SODIUM 75 UG/1
75 TABLET ORAL EVERY MORNING
Qty: 90 TABLET | Refills: 2 | Status: SHIPPED | OUTPATIENT
Start: 2023-11-13

## 2023-11-13 RX ORDER — TRAZODONE HYDROCHLORIDE 150 MG/1
150 TABLET ORAL NIGHTLY
Qty: 90 TABLET | Refills: 2 | Status: SHIPPED | OUTPATIENT
Start: 2023-11-13

## 2023-11-13 RX ORDER — LISINOPRIL AND HYDROCHLOROTHIAZIDE 20; 25 MG/1; MG/1
1 TABLET ORAL DAILY
Qty: 30 TABLET | Refills: 1 | Status: SHIPPED | OUTPATIENT
Start: 2023-11-13 | End: 2023-12-13 | Stop reason: DRUGHIGH

## 2023-11-13 RX ORDER — ROPINIROLE 1 MG/1
1 TABLET, FILM COATED ORAL NIGHTLY
Qty: 90 TABLET | Refills: 2 | Status: SHIPPED | OUTPATIENT
Start: 2023-11-13 | End: 2023-12-13 | Stop reason: SDUPTHER

## 2023-11-13 ASSESSMENT — ENCOUNTER SYMPTOMS
HYPERTENSION: 1
HEADACHES: 0
BLURRED VISION: 0
NECK PAIN: 0
ORTHOPNEA: 0
PND: 0
SWEATS: 0
SHORTNESS OF BREATH: 0
PALPITATIONS: 0

## 2023-11-13 NOTE — PROGRESS NOTES
"Subjective   Patient ID: Amanda Lovell is a 73 y.o. female who presents for Follow-up.    Hypertension  This is a chronic problem. The current episode started more than 1 year ago. The problem has been waxing and waning since onset. The problem is resistant. Pertinent negatives include no anxiety, blurred vision, chest pain, headaches, malaise/fatigue, neck pain, orthopnea, palpitations, peripheral edema, PND, shortness of breath or sweats. Agents associated with hypertension include thyroid hormones. Risk factors for coronary artery disease include dyslipidemia, family history, obesity and post-menopausal state. There are no compliance problems.       The patient reports that she is taking lisinopril for her hypertension and mentions that her average home readings are range in the 140s- 150s. She is currently taking rosuvastatin and fenofibrate for her hyperlipidemia, levothyroxine for hypothyroidism and Requip for restless leg syndrome.  She complains of tingling in her right foot.    Review of Systems  Constitutional: No fever or chills  Cardiovascular: no chest pain, no palpitations and no syncope.   Respiratory: no cough, no shortness of breath during exertion and no shortness of breath at rest.   Gastrointestinal: no abdominal pain, no nausea and no vomiting.  Neuro: + tingling of the right foot. No Headache, no dizziness    Objective   /70   Pulse 62   Ht 1.638 m (5' 4.5\")   Wt 72.1 kg (159 lb)   SpO2 97%   BMI 26.87 kg/m²     Physical Exam  Constitutional: Alert and in no acute distress. Well developed, well nourished  Head and Face: Head and face: Normal.    Cardiovascular: Heart rate and rhythm were normal, normal S1 and S2. No peripheral edema.   Pulmonary: No respiratory distress. Clear bilateral breath sounds.  Musculoskeletal: Gait and station: Normal. Muscle strength/tone: Normal.   Skin: Normal skin color and pigmentation, normal skin turgor, and no rash.    Psychiatric: Judgment and " insight: Intact. Mood and affect: Normal.    Lab Results   Component Value Date    WBC 3.9 (L) 06/05/2023    HGB 12.9 06/05/2023    HCT 41.2 06/05/2023     06/05/2023    CHOL 213 (H) 06/05/2023    TRIG 119 06/05/2023    HDL 66.8 06/05/2023    ALT 14 06/05/2023    AST 20 06/05/2023     06/05/2023    K 3.6 06/05/2023     06/05/2023    CREATININE 0.67 06/05/2023    BUN 13 06/05/2023    CO2 26 06/05/2023    TSH 0.50 06/05/2023    INR 1.0 05/16/2018       XR DEXA bone density  Narrative: Interpreted By:  ANTOINETTE PORTILLO MD  MRN: [id number]  Patient Name: MARICHUY BRISENO     STUDY:  BONE DENSITY, DEXA 1 OR MORE SITES: AXIAL SKELETN8/1/2023 8:20 am     INDICATION:  See Associated Diagnosis Asymptomatic menopausal state. The patient  is a 72 y/o  year old F.     COMPARISON:  None.     ACCESSION NUMBER(S):  [id number]     ORDERING CLINICIAN:  KEON SCHMID     TECHNIQUE:  BONE DENSITY, DEXA 1 OR MORE SITES: AXIAL SKELETN     FINDINGS:  LEFT FEMUR -TOTAL  Bone Mineral Density:0.927 g/cm2.  T-Score -0.6 Z-Score 0.9  % change vs Previous : -3. % change vs Baseline -3.     LEFT FEMUR -NECK  Bone Mineral Density:  0.847 g/cm2.  T-Score -1.4 Z-Score0.3  % change vs Previous : -5.4. % change vs Baseline -5.4.     SPINE L1-L4  Bone Mineral Density:1.321 g/cm2.  T-Score 1.0 Z-Score 2.6  % change vs Previous : 2.2. % change vs Baseline 2.2.     World Health Organization (WHO) criteria for post-menopausal,   Women:  Normal:         T-score at or above -1 SD  Osteopenia:   T-score between -1 and -2.5 SD  Osteoporosis: T-score at or below -2.5 SD        10-year Fracture Risk:  Major Osteoporotic Fracture  10.6  Hip Fracture                        1.7        Impression: According to World Health Organization criteria, classification is  osteopenia.     Follow-up exam is recommended as clinically warranted.     All images and detailed analysis are available on the  Radiology  PACS.      MACRO:  None      Assessment/Plan   Diagnoses and all orders for this visit:  Primary hypertension  -     lisinopriL-hydrochlorothiazide 20-25 mg tablet; Take 1 tablet by mouth once daily.  -     Comprehensive Metabolic Panel; Future  -     Follow Up In [facility] - PCP - Established; Future  Mixed hyperlipidemia  -     Follow Up In [facility] - PCP - Established  -     Lipid Panel; Future  -     rosuvastatin (Crestor) 10 mg tablet; Take 1 tablet (10 mg) by mouth once daily.  Screening for viral disease  -     Hepatitis C Antibody; Future  Other iron deficiency anemia  -     CBC; Future  -     Ferritin; Future  -     Iron and TIBC; Future  RLS (restless legs syndrome)  -     rOPINIRole (Requip) 1 mg tablet; Take 1 tablet (1 mg) by mouth once daily at bedtime.  Chronic insomnia  -     traZODone (Desyrel) 150 mg tablet; Take 1 tablet (150 mg) by mouth once daily at bedtime.  Other specified hypothyroidism  -     levothyroxine (Synthroid, Levoxyl) 75 mcg tablet; Take 1 tablet (75 mcg) by mouth once daily in the morning.        Dear Amanda Lovell     It was my pleasure to take care of you today in the office. Below are the things we discussed today:    1. Hyperlipidemia: Continue rosuvastatin and fenofibrate. We will recheck numbers.    2. Hypertension: Not well- controlled. Stop lisinopril. Start lisinopril-hydrochlorothiazide. The patient will get blood work done 2 weeks after starting medication.    3. RLS: Continue Requip.    4. Hypothyroidism: Continue levothyroxine.    5. Weight loss: Advised the patient to consume a healthier diet and practice portion control.     Follow up in 1 month.    Your yearly Physical is due in: July 2024  When you call the office for your yearly Physical, please ask them to inform me to order your blood work, so that you can get the fasting blood work before your appointment and we can discuss the results at your physical.      Please call me if any questions  arise from now until your next visit. I will call you after I am done seeing patients. A Doctor is always available by phone when the office is closed. Please feel free to call for help with any problem that you feel shouldn't wait until the office re-opens.     Scribe Attestation  By signing my name below, I, Miranda Franco, Alexus   attest that this documentation has been prepared under the direction and in the presence of Kathryn Dunham MD.

## 2023-11-14 ENCOUNTER — PHARMACY VISIT (OUTPATIENT)
Dept: PHARMACY | Facility: CLINIC | Age: 73
End: 2023-11-14
Payer: COMMERCIAL

## 2023-11-14 PROCEDURE — RXMED WILLOW AMBULATORY MEDICATION CHARGE

## 2023-11-30 DIAGNOSIS — E78.2 MIXED HYPERLIPIDEMIA: ICD-10-CM

## 2023-11-30 RX ORDER — FENOFIBRIC ACID 45 MG/1
45 CAPSULE, DELAYED RELEASE ORAL DAILY
Qty: 30 CAPSULE | Refills: 0 | Status: SHIPPED | OUTPATIENT
Start: 2023-11-30 | End: 2023-12-13 | Stop reason: SDUPTHER

## 2023-12-01 ENCOUNTER — PHARMACY VISIT (OUTPATIENT)
Dept: PHARMACY | Facility: CLINIC | Age: 73
End: 2023-12-01
Payer: COMMERCIAL

## 2023-12-01 ENCOUNTER — TELEPHONE (OUTPATIENT)
Dept: ORTHOPEDIC SURGERY | Facility: HOSPITAL | Age: 73
End: 2023-12-01
Payer: COMMERCIAL

## 2023-12-01 PROCEDURE — RXMED WILLOW AMBULATORY MEDICATION CHARGE

## 2023-12-06 DIAGNOSIS — E78.2 MIXED HYPERLIPIDEMIA: ICD-10-CM

## 2023-12-06 DIAGNOSIS — Z11.59 SCREENING FOR VIRAL DISEASE: ICD-10-CM

## 2023-12-06 DIAGNOSIS — D50.8 OTHER IRON DEFICIENCY ANEMIA: Primary | ICD-10-CM

## 2023-12-08 ENCOUNTER — LAB (OUTPATIENT)
Dept: LAB | Facility: LAB | Age: 73
End: 2023-12-08
Payer: COMMERCIAL

## 2023-12-08 DIAGNOSIS — D50.8 OTHER IRON DEFICIENCY ANEMIA: ICD-10-CM

## 2023-12-08 DIAGNOSIS — Z11.59 SCREENING FOR VIRAL DISEASE: ICD-10-CM

## 2023-12-08 DIAGNOSIS — E78.2 MIXED HYPERLIPIDEMIA: ICD-10-CM

## 2023-12-08 DIAGNOSIS — I10 PRIMARY HYPERTENSION: ICD-10-CM

## 2023-12-08 LAB
ALBUMIN SERPL BCP-MCNC: 3.9 G/DL (ref 3.4–5)
ALP SERPL-CCNC: 58 U/L (ref 33–136)
ALT SERPL W P-5'-P-CCNC: 12 U/L (ref 7–45)
ANION GAP SERPL CALC-SCNC: 11 MMOL/L (ref 10–20)
AST SERPL W P-5'-P-CCNC: 19 U/L (ref 9–39)
BILIRUB SERPL-MCNC: 0.9 MG/DL (ref 0–1.2)
BUN SERPL-MCNC: 17 MG/DL (ref 6–23)
CALCIUM SERPL-MCNC: 9.9 MG/DL (ref 8.6–10.6)
CHLORIDE SERPL-SCNC: 103 MMOL/L (ref 98–107)
CHOLEST SERPL-MCNC: 169 MG/DL (ref 0–199)
CHOLESTEROL/HDL RATIO: 2.8
CO2 SERPL-SCNC: 30 MMOL/L (ref 21–32)
CREAT SERPL-MCNC: 0.7 MG/DL (ref 0.5–1.05)
ERYTHROCYTE [DISTWIDTH] IN BLOOD BY AUTOMATED COUNT: 13.2 % (ref 11.5–14.5)
FERRITIN SERPL-MCNC: 43 NG/ML (ref 8–150)
GFR SERPL CREATININE-BSD FRML MDRD: >90 ML/MIN/1.73M*2
GLUCOSE SERPL-MCNC: 109 MG/DL (ref 74–99)
HCT VFR BLD AUTO: 40.4 % (ref 36–46)
HCV AB SER QL: NONREACTIVE
HDLC SERPL-MCNC: 60.9 MG/DL
HGB BLD-MCNC: 13 G/DL (ref 12–16)
IRON SATN MFR SERPL: 13 % (ref 25–45)
IRON SERPL-MCNC: 45 UG/DL (ref 35–150)
LDLC SERPL CALC-MCNC: 83 MG/DL
MCH RBC QN AUTO: 27.8 PG (ref 26–34)
MCHC RBC AUTO-ENTMCNC: 32.2 G/DL (ref 32–36)
MCV RBC AUTO: 86 FL (ref 80–100)
NON HDL CHOLESTEROL: 108 MG/DL (ref 0–149)
NRBC BLD-RTO: 0 /100 WBCS (ref 0–0)
PLATELET # BLD AUTO: 292 X10*3/UL (ref 150–450)
POTASSIUM SERPL-SCNC: 4.2 MMOL/L (ref 3.5–5.3)
PROT SERPL-MCNC: 6.5 G/DL (ref 6.4–8.2)
RBC # BLD AUTO: 4.68 X10*6/UL (ref 4–5.2)
SODIUM SERPL-SCNC: 140 MMOL/L (ref 136–145)
TIBC SERPL-MCNC: 336 UG/DL (ref 240–445)
TRIGL SERPL-MCNC: 126 MG/DL (ref 0–149)
UIBC SERPL-MCNC: 291 UG/DL (ref 110–370)
VLDL: 25 MG/DL (ref 0–40)
WBC # BLD AUTO: 5 X10*3/UL (ref 4.4–11.3)

## 2023-12-08 PROCEDURE — 83540 ASSAY OF IRON: CPT

## 2023-12-08 PROCEDURE — 82728 ASSAY OF FERRITIN: CPT

## 2023-12-08 PROCEDURE — 85027 COMPLETE CBC AUTOMATED: CPT

## 2023-12-08 PROCEDURE — 83550 IRON BINDING TEST: CPT

## 2023-12-08 PROCEDURE — 80061 LIPID PANEL: CPT

## 2023-12-08 PROCEDURE — 36415 COLL VENOUS BLD VENIPUNCTURE: CPT

## 2023-12-08 PROCEDURE — 80053 COMPREHEN METABOLIC PANEL: CPT

## 2023-12-08 PROCEDURE — 86803 HEPATITIS C AB TEST: CPT

## 2023-12-08 NOTE — PROGRESS NOTES
Urogynecology  Provider:  Karla Terry MD  [phone]              ASSESSMENT AND PLAN:     Problem List Items Addressed This Visit    None          I spent a total of eConsult Time: 25 minutes in face to face and non face to face time.        Karla Terry MD  HISTORY OF PRESENT ILLNESS    Amanda Benitez a 73 y.o. 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          Past Medical History:   Diagnosis Date    Encounter for fitting and adjustment of other specified devices 07/22/2018    Pessary maintenance    Encounter for general adult medical examination without abnormal findings 06/14/2021    Preventative health care    Other abnormal glucose 12/16/2016    Elevated glucose    Other specified complication of genitourinary prosthetic devices, implants and grafts, initial encounter (CMS/[facility]) 07/22/2018    Vaginal erosion secondary to pessary use, initial encounter    Pain in right foot 11/06/2017    Foot pain, bilateral    Pain in unspecified foot 06/30/2021    Foot pain    Personal history of other diseases of the circulatory system 11/18/2016    History of abnormal electrocardiography    Personal history of other diseases of the female genital tract 06/04/2020    History of uterine prolapse    Personal history of other diseases of the female genital tract 05/12/2016    History of postmenopausal bleeding    Personal history of other diseases of the nervous system and sense organs 03/22/2017    History of bacterial conjunctivitis    Personal history of other diseases of the respiratory system 11/19/2021    History of sore throat    Personal history of  "other diseases of urinary system 07/26/2018    History of hematuria    Personal history of other endocrine, nutritional and metabolic disease     History of hypothyroidism    Personal history of other specified conditions 02/21/2019    History of dysuria    Personal history of urinary (tract) infections 02/21/2019    History of urinary tract infection    Postmenopausal atrophic vaginitis 06/14/2021    Vaginal atrophy    Urge incontinence 06/11/2019    Urge incontinence          Past Surgical History:       Past Surgical History:   Procedure Laterality Date    CATARACT EXTRACTION  12/16/2016    Cataract Surgery    HAND TENDON SURGERY  12/16/2016    Hand Incision Tendon Sheath Of A Finger    OTHER SURGICAL HISTORY  12/16/2016    Prophylaxis Of Retinal Detachment    OTHER SURGICAL HISTORY  12/16/2016    Condylotomy Of TMJ    OTHER SURGICAL HISTORY  12/16/2016    Cervical Surgery (Gyn)    OTHER SURGICAL HISTORY  09/27/2021    Hysterectomy    TOTAL KNEE ARTHROPLASTY  12/16/2016    Total Knee Arthroplasty         Medications:       Prior to Admission medications    Medication Sig Start Date End Date Taking? Authorizing Provider   choline fenofibrate (Trilipix) 45 mg DR capsule Take 1 capsule (45 mg) by mouth once daily. 11/30/23   Kathryn Dunham MD   levothyroxine (Synthroid, Levoxyl) 75 mcg tablet Take 1 tablet (75 mcg) by mouth once daily in the morning. 11/13/23   Kathryn Dunham MD   lisinopriL-hydrochlorothiazide 20-25 mg tablet Take 1 tablet by mouth once daily. 11/13/23 1/12/24  Kathryn Dunham MD   rOPINIRole (Requip) 1 mg tablet Take 1 tablet (1 mg) by mouth once daily at bedtime. 11/13/23   Kathryn Dunham MD   rosuvastatin (Crestor) 10 mg tablet Take 1 tablet (10 mg) by mouth once daily. 11/13/23   Kathryn Dunham MD   traZODone (Desyrel) 150 mg tablet Take 1 tablet (150 mg) by mouth once daily at bedtime. 11/13/23   Kathryn Dunham MD        ROS  Review of Systems     No results found for: \"BLOODU\", " "\"NITRITEU\", \"UROBILINOGEN\"      PHYSICAL EXAM:      There were no vitals taken for this visit.     No LMP recorded. Patient is postmenopausal.      Declines chaperone for physical exam.      Well developed, well nourished, in no apparent distress.   Neurologic/Psychiatric:  Awake, Alert and Oriented times 3.  Affect normal.     GENITAL/URINARY:       External Genitalia:  The patient has normal appearing external genitalia, normal skenes and bartholins glands, and a normal hair distribution.  Her vulva is without lesions, erythema or discharge.  It is non-tender with appropriate sensation.     Urethral Meatus:  Size normal, Location normal, Lesions absent, Prolapse absent,      Urethra:  Fullness absent, moderate urethral caruncle noted    Bladder:  Fullness absent, Masses absent, Tenderness absent,      Vagina:  General appearance normal, Estrogen effect normal, Discharge absent, Lesions absent, no mesh erosion, stenosis of the apex, lichen sclerosis noted      Cervix: Normal, no discharge.   Uterus:  absent  Adnexa: normal     Anus/Perineum:  Lesions absent and Masses absent, WNL  Normal Perineum           POP-Q:  No prolapse         Data and DIAGNOSTIC STUDIES REVIEWED   No results found.   No results found for: \"URINECULTURE\", \"UAMICCOMM\"   Lab Results   Component Value Date    GLUCOSE 84 06/05/2023    CALCIUM 9.0 06/05/2023     06/05/2023    K 3.6 06/05/2023    CO2 26 06/05/2023     06/05/2023    BUN 13 06/05/2023    CREATININE 0.67 06/05/2023     Lab Results   Component Value Date    WBC 3.9 (L) 06/05/2023    HGB 12.9 06/05/2023    HCT 41.2 06/05/2023    MCV 87 06/05/2023     06/05/2023      ASSESSMENT &PLAN     Assessment:   73 y.o. old female being assessed for HAYLIE, uterovaginal prolapse, OAB and lichen sclerosis     Diagnosis:  #1 HAYLIE  #2 Uterovaginal prolapse   #3 Lichen sclerosis   #4 OAB    1. Uterovaginal prolapse, HAYLIE  - 3/15/2019 procedure: Robotic-associated BILL, robotic-associated " SCPXY, midurethral sling, posterior colporrhaphy, perineorrhaphy, and cystoscopy.   - No evidence of POP or HAYLIE by clinical exam and subjectively per the patient.     2. Lichen sclerosis  - Refilled Lotrisone cream to be used PRN     3. OAB  - Rx'd Trospium 20MG PO BID or PRN for travelling      Follow-up in one year with Dr. Mara Vaughan Attestation  By signing my name below, I, Alexus Hernández   attest that this documentation has been prepared under the direction and in the presence of Karla Terry MD.     Agree with above  Doing well. Still working 3 days a week.  Follow up in 1 year  I Dr. Terry, personally performed the services described in the documentation which was scribed virtually and confirm it is both complete and accurate.  Karla Terry MD

## 2023-12-11 ENCOUNTER — OFFICE VISIT (OUTPATIENT)
Dept: OBSTETRICS AND GYNECOLOGY | Facility: CLINIC | Age: 73
End: 2023-12-11
Payer: COMMERCIAL

## 2023-12-11 VITALS
HEART RATE: 81 BPM | DIASTOLIC BLOOD PRESSURE: 67 MMHG | BODY MASS INDEX: 26.29 KG/M2 | SYSTOLIC BLOOD PRESSURE: 167 MMHG | HEIGHT: 64 IN | WEIGHT: 154 LBS

## 2023-12-11 DIAGNOSIS — N32.81 OVERACTIVE BLADDER: ICD-10-CM

## 2023-12-11 DIAGNOSIS — L90.0 LICHEN SCLEROSUS ET ATROPHICUS: ICD-10-CM

## 2023-12-11 DIAGNOSIS — N39.9 URINARY DISORDER: Primary | ICD-10-CM

## 2023-12-11 LAB
POC APPEARANCE, URINE: CLEAR
POC BILIRUBIN, URINE: NEGATIVE
POC BLOOD, URINE: NEGATIVE
POC COLOR, URINE: YELLOW
POC GLUCOSE, URINE: NEGATIVE MG/DL
POC KETONES, URINE: NEGATIVE MG/DL
POC LEUKOCYTES, URINE: NEGATIVE
POC NITRITE,URINE: NEGATIVE
POC PH, URINE: 7.5 PH
POC PROTEIN, URINE: NEGATIVE MG/DL
POC SPECIFIC GRAVITY, URINE: 1.01
POC UROBILINOGEN, URINE: 0.2 EU/DL

## 2023-12-11 PROCEDURE — RXMED WILLOW AMBULATORY MEDICATION CHARGE

## 2023-12-11 PROCEDURE — 1036F TOBACCO NON-USER: CPT | Performed by: OBSTETRICS & GYNECOLOGY

## 2023-12-11 PROCEDURE — 81003 URINALYSIS AUTO W/O SCOPE: CPT | Performed by: OBSTETRICS & GYNECOLOGY

## 2023-12-11 PROCEDURE — 1159F MED LIST DOCD IN RCRD: CPT | Performed by: OBSTETRICS & GYNECOLOGY

## 2023-12-11 PROCEDURE — 51798 US URINE CAPACITY MEASURE: CPT | Performed by: OBSTETRICS & GYNECOLOGY

## 2023-12-11 PROCEDURE — 3078F DIAST BP <80 MM HG: CPT | Performed by: OBSTETRICS & GYNECOLOGY

## 2023-12-11 PROCEDURE — 1160F RVW MEDS BY RX/DR IN RCRD: CPT | Performed by: OBSTETRICS & GYNECOLOGY

## 2023-12-11 PROCEDURE — 1126F AMNT PAIN NOTED NONE PRSNT: CPT | Performed by: OBSTETRICS & GYNECOLOGY

## 2023-12-11 PROCEDURE — 3077F SYST BP >= 140 MM HG: CPT | Performed by: OBSTETRICS & GYNECOLOGY

## 2023-12-11 PROCEDURE — 99214 OFFICE O/P EST MOD 30 MIN: CPT | Performed by: OBSTETRICS & GYNECOLOGY

## 2023-12-11 RX ORDER — CLOTRIMAZOLE AND BETAMETHASONE DIPROPIONATE 10; .64 MG/G; MG/G
1 CREAM TOPICAL 2 TIMES DAILY
Qty: 15 G | Refills: 3 | Status: SHIPPED | OUTPATIENT
Start: 2023-12-11 | End: 2024-02-12

## 2023-12-11 RX ORDER — TROSPIUM CHLORIDE 20 MG/1
20 TABLET, FILM COATED ORAL 2 TIMES DAILY
Qty: 60 TABLET | Refills: 3 | Status: SHIPPED | OUTPATIENT
Start: 2023-12-11

## 2023-12-11 ASSESSMENT — PATIENT HEALTH QUESTIONNAIRE - PHQ9
1. LITTLE INTEREST OR PLEASURE IN DOING THINGS: NOT AT ALL
2. FEELING DOWN, DEPRESSED OR HOPELESS: NOT AT ALL
SUM OF ALL RESPONSES TO PHQ9 QUESTIONS 1 AND 2: 0

## 2023-12-11 ASSESSMENT — PAIN SCALES - GENERAL: PAINLEVEL: 0-NO PAIN

## 2023-12-12 ENCOUNTER — OFFICE VISIT (OUTPATIENT)
Dept: ORTHOPEDIC SURGERY | Facility: CLINIC | Age: 73
End: 2023-12-12
Payer: COMMERCIAL

## 2023-12-12 DIAGNOSIS — M17.12 PRIMARY OSTEOARTHRITIS OF LEFT KNEE: Primary | ICD-10-CM

## 2023-12-12 PROCEDURE — 1036F TOBACCO NON-USER: CPT | Performed by: FAMILY MEDICINE

## 2023-12-12 PROCEDURE — 1159F MED LIST DOCD IN RCRD: CPT | Performed by: FAMILY MEDICINE

## 2023-12-12 PROCEDURE — 1126F AMNT PAIN NOTED NONE PRSNT: CPT | Performed by: FAMILY MEDICINE

## 2023-12-12 PROCEDURE — 1160F RVW MEDS BY RX/DR IN RCRD: CPT | Performed by: FAMILY MEDICINE

## 2023-12-12 PROCEDURE — 20610 DRAIN/INJ JOINT/BURSA W/O US: CPT | Performed by: FAMILY MEDICINE

## 2023-12-12 PROCEDURE — 99203 OFFICE O/P NEW LOW 30 MIN: CPT | Performed by: FAMILY MEDICINE

## 2023-12-12 RX ORDER — LIDOCAINE HYDROCHLORIDE 10 MG/ML
4 INJECTION INFILTRATION; PERINEURAL
Status: COMPLETED | OUTPATIENT
Start: 2023-12-12 | End: 2023-12-12

## 2023-12-12 RX ORDER — TRIAMCINOLONE ACETONIDE 40 MG/ML
40 INJECTION, SUSPENSION INTRA-ARTICULAR; INTRAMUSCULAR
Status: COMPLETED | OUTPATIENT
Start: 2023-12-12 | End: 2023-12-12

## 2023-12-12 RX ADMIN — TRIAMCINOLONE ACETONIDE 40 MG: 40 INJECTION, SUSPENSION INTRA-ARTICULAR; INTRAMUSCULAR at 17:15

## 2023-12-12 RX ADMIN — LIDOCAINE HYDROCHLORIDE 4 ML: 10 INJECTION INFILTRATION; PERINEURAL at 17:15

## 2023-12-12 NOTE — LETTER
December 12, 2023     Garrett Stahl PA-C  [id number]  Department Of Orthopedics  [facility] [address]    Patient: Amanda Lovell   YOB: 1950   Date of Visit: 12/12/2023       Dear Dr. Garrett Stahl PA-C:    Thank you for referring Amanda Lovell to me for evaluation. Below are my notes for this consultation.  If you have questions, please do not hesitate to call me. I look forward to following your patient along with you.       Sincerely,     Jamari Stokes, DO      CC: No Recipients  ______________________________________________________________________________________    ** Please excuse any errors in grammar or translation related to this dictation. Voice recognition software was utilized to prepare this document. **    Assessment & Plan:  Patient referred for consideration of viscosupplement injection.  Hx of left knee arthritis.  To this point, has been managing symptoms with otc analgesics, steroid injections x2 and working with  on lower body strengthening 2x/week. Has never received viscosupplement injection previously.  Discussed with patient how a viscosupplement injection differs from steroid along with risks and benefits of the procedure.  Patient is considered a good candidate for completion of viscosupplement injection. We will obtain prior authorization to complete and patient will be scheduled once approved. For the interim until this is approved, offered to complete CSI which she agreed to have performed. All questions answered and patient agreeable to this plan of care.    A copy of today's report will be electronically sent to BLADE Stahl.      Chief complaint:  Left knee pain    HPI:  74 y/o female patient, hx of right knee total arthroplasty in 2010, presents with left knee pain.  This complaint has been ongoing for a year.  No mechanism of injury reported at onset. Symptoms have progressively worsened with time.  Pain is most prominent at medial knee.   Swelling present intermittently. It is associated with sensation of stiffness, loss of joint motion, crackling/popping sensation.   Symptoms are aggravated by sitting to standing, changing positions, knee flexion,. To date, patient has tried a variety of treatments to include Tylenol, tens unit, cortisone injection. Previously saw Shalom Stahl PA-C for this with last visit being 5/12/23. Last steroid injection completed 5/12/23. She wanted to pursue having a viscosupplement injection opposed to steroid and was referred here for this.     Exam:  LEFT Knee examined. No effusion, ecchymosis, or erythema.  AROM from 5 to 120 deg with 5/5 strength. SILT overlying knee. Motion crepitus present. Tenderness along medial joint line.  No popliteal mass palpated. Negative anterior and posterior drawer.  No laxity to varus or valgus stress at 0 or 30 deg.  No patellar apprehension.      Results:  X-rays of left knee obtained September 2022 reviewed which demonstrate mild arthritic changes.    Reviewed previous encounters with BLADE Stahl for this condition.    Procedure:  Patient ID: Amanda Lovell is a 73 y.o. female.    L Inj/Asp: L knee on 12/12/2023 5:15 PM  Indications: pain  Details: 25 G needle, anterolateral approach  Medications: 40 mg triamcinolone acetonide 40 mg/mL; 4 mL lidocaine 10 mg/mL (1 %)  Outcome: tolerated well, no immediate complications  Procedure, treatment alternatives, risks and benefits explained, specific risks discussed. Consent was given by the patient. Immediately prior to procedure a time out was called to verify the correct patient, procedure, equipment, support staff and site/side marked as required. Patient was prepped and draped in the usual sterile fashion.

## 2023-12-12 NOTE — PROGRESS NOTES
** Please excuse any errors in grammar or translation related to this dictation. Voice recognition software was utilized to prepare this document. **    Assessment & Plan:  Patient referred for consideration of viscosupplement injection.  Hx of left knee arthritis.  To this point, has been managing symptoms with otc analgesics, steroid injections x2 and working with  on lower body strengthening 2x/week. Has never received viscosupplement injection previously.  Discussed with patient how a viscosupplement injection differs from steroid along with risks and benefits of the procedure.  Patient is considered a good candidate for completion of viscosupplement injection. We will obtain prior authorization to complete and patient will be scheduled once approved. For the interim until this is approved, offered to complete CSI which she agreed to have performed. All questions answered and patient agreeable to this plan of care.    A copy of today's report will be electronically sent to BLADE Stahl.      Chief complaint:  Left knee pain    HPI:  72 y/o female patient, hx of right knee total arthroplasty in 2010, presents with left knee pain.  This complaint has been ongoing for a year.  No mechanism of injury reported at onset. Symptoms have progressively worsened with time.  Pain is most prominent at medial knee.  Swelling present intermittently. It is associated with sensation of stiffness, loss of joint motion, crackling/popping sensation.   Symptoms are aggravated by sitting to standing, changing positions, knee flexion,. To date, patient has tried a variety of treatments to include Tylenol, tens unit, cortisone injection. Previously saw Shalom Stahl PA-C for this with last visit being 5/12/23. Last steroid injection completed 5/12/23. She wanted to pursue having a viscosupplement injection opposed to steroid and was referred here for this.     Exam:  LEFT Knee examined. No effusion, ecchymosis, or erythema.   AROM from 5 to 120 deg with 5/5 strength. SILT overlying knee. Motion crepitus present. Tenderness along medial joint line.  No popliteal mass palpated. Negative anterior and posterior drawer.  No laxity to varus or valgus stress at 0 or 30 deg.  No patellar apprehension.      Results:  X-rays of left knee obtained September 2022 reviewed which demonstrate mild arthritic changes.    Reviewed previous encounters with BLADE Stahl for this condition.    Procedure:  Patient ID: Amanda Lovell is a 73 y.o. female.    L Inj/Asp: L knee on 12/12/2023 5:15 PM  Indications: pain  Details: 25 G needle, anterolateral approach  Medications: 40 mg triamcinolone acetonide 40 mg/mL; 4 mL lidocaine 10 mg/mL (1 %)  Outcome: tolerated well, no immediate complications  Procedure, treatment alternatives, risks and benefits explained, specific risks discussed. Consent was given by the patient. Immediately prior to procedure a time out was called to verify the correct patient, procedure, equipment, support staff and site/side marked as required. Patient was prepped and draped in the usual sterile fashion.

## 2023-12-13 ENCOUNTER — OFFICE VISIT (OUTPATIENT)
Dept: PRIMARY CARE | Facility: CLINIC | Age: 73
End: 2023-12-13
Payer: COMMERCIAL

## 2023-12-13 VITALS
SYSTOLIC BLOOD PRESSURE: 150 MMHG | HEART RATE: 80 BPM | OXYGEN SATURATION: 98 % | BODY MASS INDEX: 26.29 KG/M2 | WEIGHT: 154 LBS | DIASTOLIC BLOOD PRESSURE: 78 MMHG | HEIGHT: 64 IN

## 2023-12-13 DIAGNOSIS — E03.9 ACQUIRED HYPOTHYROIDISM: ICD-10-CM

## 2023-12-13 DIAGNOSIS — F51.04 CHRONIC INSOMNIA: ICD-10-CM

## 2023-12-13 DIAGNOSIS — G25.81 RLS (RESTLESS LEGS SYNDROME): ICD-10-CM

## 2023-12-13 DIAGNOSIS — I10 PRIMARY HYPERTENSION: ICD-10-CM

## 2023-12-13 DIAGNOSIS — E78.2 MIXED HYPERLIPIDEMIA: Primary | ICD-10-CM

## 2023-12-13 PROBLEM — N39.0 UTI (URINARY TRACT INFECTION): Status: RESOLVED | Noted: 2023-02-10 | Resolved: 2023-12-13

## 2023-12-13 PROBLEM — L23.7 CONTACT DERMATITIS DUE TO POISON IVY: Status: RESOLVED | Noted: 2023-02-10 | Resolved: 2023-12-13

## 2023-12-13 PROCEDURE — 1159F MED LIST DOCD IN RCRD: CPT | Performed by: FAMILY MEDICINE

## 2023-12-13 PROCEDURE — 3078F DIAST BP <80 MM HG: CPT | Performed by: FAMILY MEDICINE

## 2023-12-13 PROCEDURE — 1126F AMNT PAIN NOTED NONE PRSNT: CPT | Performed by: FAMILY MEDICINE

## 2023-12-13 PROCEDURE — RXMED WILLOW AMBULATORY MEDICATION CHARGE

## 2023-12-13 PROCEDURE — 1160F RVW MEDS BY RX/DR IN RCRD: CPT | Performed by: FAMILY MEDICINE

## 2023-12-13 PROCEDURE — 3077F SYST BP >= 140 MM HG: CPT | Performed by: FAMILY MEDICINE

## 2023-12-13 PROCEDURE — 99214 OFFICE O/P EST MOD 30 MIN: CPT | Performed by: FAMILY MEDICINE

## 2023-12-13 PROCEDURE — 1036F TOBACCO NON-USER: CPT | Performed by: FAMILY MEDICINE

## 2023-12-13 RX ORDER — FENOFIBRIC ACID 45 MG/1
45 CAPSULE, DELAYED RELEASE ORAL DAILY
Qty: 90 CAPSULE | Refills: 1 | Status: SHIPPED | OUTPATIENT
Start: 2023-12-13

## 2023-12-13 RX ORDER — CHLORTHALIDONE 50 MG/1
50 TABLET ORAL DAILY
Qty: 90 TABLET | Refills: 0 | Status: SHIPPED | OUTPATIENT
Start: 2023-12-13 | End: 2024-01-16 | Stop reason: SDUPTHER

## 2023-12-13 RX ORDER — FERROUS GLUCONATE 325 MG
38 TABLET ORAL 3 TIMES WEEKLY
COMMUNITY

## 2023-12-13 RX ORDER — CHOLECALCIFEROL (VITAMIN D3) 50 MCG
50 TABLET ORAL DAILY
COMMUNITY

## 2023-12-13 RX ORDER — ROPINIROLE 2 MG/1
2 TABLET, FILM COATED ORAL NIGHTLY
Qty: 90 TABLET | Refills: 1 | Status: SHIPPED | OUTPATIENT
Start: 2023-12-13 | End: 2024-01-16 | Stop reason: SDUPTHER

## 2023-12-13 NOTE — PATIENT INSTRUCTIONS
Take over the counter Ferrous Gluconate 3 times a week    Check Labs Dec 22 - and if Potassium is low will need supplements

## 2023-12-13 NOTE — PROGRESS NOTES
"Subjective   Patient ID: Amanda Lovell is a 73 y.o. female who presents for Follow-up.    HPI   The patient reports that she is taking lisinopril-hydrochlorothiazide for her hypertension as prescribed and her home blood pressure readings have been elevated. Her cholesterol is well- controlled on the current dose of rosuvastatin and fenofibrate and her LDL has improved significantly. She is also taking levothyroxine for hypothyroidism, Trazodone for her insomnia and Tropsium for stress incontinence and has no side effects from any of these medications. She mentions that she got a cortisone injection 1 day ago for her left knee pain and also followed up with Urogynecology.The patient adds that despite using Requip she is still experiencing leg cramps.    Review of Systems  Constitutional: No fever or chills  Cardiovascular: no chest pain, no palpitations and no syncope.   Respiratory: no cough, no shortness of breath during exertion and no shortness of breath at rest.   Gastrointestinal: no abdominal pain, no nausea and no vomiting.  Neuro: No Headache, no dizziness  MSK: + bilateral leg cramps.    Objective   /75   Pulse 80   Ht 1.626 m (5' 4\")   Wt 69.9 kg (154 lb)   SpO2 98%   BMI 26.43 kg/m²     Physical Exam  Constitutional: Alert and in no acute distress. Well developed, well nourished  Head and Face: Head and face: Normal.    Cardiovascular: Heart rate and rhythm were normal, normal S1 and S2. No peripheral edema.   Pulmonary: No respiratory distress. Clear bilateral breath sounds.  Musculoskeletal: Gait and station: Normal. Muscle strength/tone: Normal.   Skin: Normal skin color and pigmentation, normal skin turgor, and no rash.    Psychiatric: Judgment and insight: Intact. Mood and affect: Normal.    Lab Results   Component Value Date    WBC 5.0 12/08/2023    HGB 13.0 12/08/2023    HCT 40.4 12/08/2023     12/08/2023    CHOL 169 12/08/2023    TRIG 126 12/08/2023    HDL 60.9 12/08/2023    " ALT 12 12/08/2023    AST 19 12/08/2023     12/08/2023    K 4.2 12/08/2023     12/08/2023    CREATININE 0.70 12/08/2023    BUN 17 12/08/2023    CO2 30 12/08/2023    TSH 0.50 06/05/2023    INR 1.0 05/16/2018       XR DEXA bone density  Narrative: Interpreted By:  ANTOINETTE PORTILLO MD  MRN: [id number]  Patient Name: MARICHUY BRISENO     STUDY:  BONE DENSITY, DEXA 1 OR MORE SITES: AXIAL SKELETN8/1/2023 8:20 am     INDICATION:  See Associated Diagnosis Asymptomatic menopausal state. The patient  is a 74 y/o  year old F.     COMPARISON:  None.     ACCESSION NUMBER(S):  [id number]     ORDERING CLINICIAN:  KEON SCHMID     TECHNIQUE:  BONE DENSITY, DEXA 1 OR MORE SITES: AXIAL SKELETN     FINDINGS:  LEFT FEMUR -TOTAL  Bone Mineral Density:0.927 g/cm2.  T-Score -0.6 Z-Score 0.9  % change vs Previous : -3. % change vs Baseline -3.     LEFT FEMUR -NECK  Bone Mineral Density:  0.847 g/cm2.  T-Score -1.4 Z-Score0.3  % change vs Previous : -5.4. % change vs Baseline -5.4.     SPINE L1-L4  Bone Mineral Density:1.321 g/cm2.  T-Score 1.0 Z-Score 2.6  % change vs Previous : 2.2. % change vs Baseline 2.2.     World Health Organization (WHO) criteria for post-menopausal,   Women:  Normal:         T-score at or above -1 SD  Osteopenia:   T-score between -1 and -2.5 SD  Osteoporosis: T-score at or below -2.5 SD        10-year Fracture Risk:  Major Osteoporotic Fracture  10.6  Hip Fracture                        1.7        Impression: According to World Health Organization criteria, classification is  osteopenia.     Follow-up exam is recommended as clinically warranted.     All images and detailed analysis are available on the  Radiology  PACS.     MACRO:  None      Assessment/Plan   Diagnoses and all orders for this visit:  Mixed hyperlipidemia  -     choline fenofibrate (Trilipix) 45 mg DR capsule; Take 1 capsule (45 mg) by mouth once daily.  Primary hypertension  -     Follow Up In [facility] - PCP -  Established  -     chlorthalidone (Hygroton) 50 mg tablet; Take 1 tablet (50 mg) by mouth once daily.  -     Follow Up In [facility] - PCP - Established; Future  -     Renal Function Panel; Future  RLS (restless legs syndrome)  -     rOPINIRole (Requip) 2 mg tablet; Take 1 tablet (2 mg) by mouth once daily at bedtime.  Acquired hypothyroidism  Chronic insomnia        Dear Amanda Lovlel     It was my pleasure to take care of you today in the office. Below are the things we discussed today:    1. Hyperlipidemia: Well- controlled. Continue rosuvastatin and fenofibrate.      2. Hypertension: Not well- controlled. Stop lisinopril-hydrochlorothiazide. Start 50 mg chlorthalidone, take with breakfast daily. The patient will get blood work done prior to travelling and if her potassium is low she will eat a banana daily or start potassium supplements.     3. RLS: Increase Requip to 2 mg.     4. Hypothyroidism: Continue levothyroxine.    5. Insomnia: Continue Trazodone.    6. Stress incontinence: Continue Tropsium.     7. Iron deficiency anemia: Take over-the-counter ferrous gluconate 3 times weekly.    Follow up in 1 month    Your yearly Physical is due in: July 2024  When you call the office for your yearly Physical, please ask them to inform me to order your blood work, so that you can get the fasting blood work before your appointment and we can discuss the results at your physical.      Please call me if any questions arise from now until your next visit. I will call you after I am done seeing patients. A Doctor is always available by phone when the office is closed. Please feel free to call for help with any problem that you feel shouldn't wait until the office re-opens.     Scribe Attestation  By signing my name below, I, Alexus Meade   attest that this documentation has been prepared under the direction and in the presence of Kathryn Dunham MD.

## 2023-12-14 ENCOUNTER — PHARMACY VISIT (OUTPATIENT)
Dept: PHARMACY | Facility: CLINIC | Age: 73
End: 2023-12-14
Payer: COMMERCIAL

## 2023-12-21 ENCOUNTER — LAB (OUTPATIENT)
Dept: LAB | Facility: LAB | Age: 73
End: 2023-12-21
Payer: COMMERCIAL

## 2023-12-21 DIAGNOSIS — I10 PRIMARY HYPERTENSION: ICD-10-CM

## 2023-12-21 LAB
ALBUMIN SERPL BCP-MCNC: 4.1 G/DL (ref 3.4–5)
ANION GAP SERPL CALC-SCNC: 12 MMOL/L (ref 10–20)
BUN SERPL-MCNC: 25 MG/DL (ref 6–23)
CALCIUM SERPL-MCNC: 9.9 MG/DL (ref 8.6–10.6)
CHLORIDE SERPL-SCNC: 101 MMOL/L (ref 98–107)
CO2 SERPL-SCNC: 30 MMOL/L (ref 21–32)
CREAT SERPL-MCNC: 0.97 MG/DL (ref 0.5–1.05)
GFR SERPL CREATININE-BSD FRML MDRD: 62 ML/MIN/1.73M*2
GLUCOSE SERPL-MCNC: 150 MG/DL (ref 74–99)
PHOSPHATE SERPL-MCNC: 4.2 MG/DL (ref 2.5–4.9)
POTASSIUM SERPL-SCNC: 4 MMOL/L (ref 3.5–5.3)
SODIUM SERPL-SCNC: 139 MMOL/L (ref 136–145)

## 2023-12-21 PROCEDURE — 36415 COLL VENOUS BLD VENIPUNCTURE: CPT

## 2023-12-21 PROCEDURE — 80069 RENAL FUNCTION PANEL: CPT

## 2024-01-04 ENCOUNTER — PHARMACY VISIT (OUTPATIENT)
Dept: PHARMACY | Facility: CLINIC | Age: 74
End: 2024-01-04
Payer: COMMERCIAL

## 2024-01-04 PROCEDURE — RXMED WILLOW AMBULATORY MEDICATION CHARGE

## 2024-01-06 PROCEDURE — RXMED WILLOW AMBULATORY MEDICATION CHARGE

## 2024-01-08 ENCOUNTER — HOSPITAL ENCOUNTER (OUTPATIENT)
Dept: RADIOLOGY | Facility: HOSPITAL | Age: 74
Discharge: HOME | End: 2024-01-08
Payer: COMMERCIAL

## 2024-01-08 VITALS — HEIGHT: 64 IN | BODY MASS INDEX: 26.29 KG/M2 | WEIGHT: 154 LBS

## 2024-01-08 DIAGNOSIS — Z12.31 ENCOUNTER FOR SCREENING MAMMOGRAM FOR MALIGNANT NEOPLASM OF BREAST: ICD-10-CM

## 2024-01-08 PROCEDURE — 77067 SCR MAMMO BI INCL CAD: CPT

## 2024-01-10 ENCOUNTER — PHARMACY VISIT (OUTPATIENT)
Dept: PHARMACY | Facility: CLINIC | Age: 74
End: 2024-01-10
Payer: COMMERCIAL

## 2024-01-10 PROCEDURE — RXMED WILLOW AMBULATORY MEDICATION CHARGE

## 2024-01-10 RX ORDER — AMOXICILLIN 500 MG/1
CAPSULE ORAL
Qty: 30 CAPSULE | Refills: 0 | OUTPATIENT
Start: 2024-01-09 | End: 2024-01-16 | Stop reason: ALTCHOICE

## 2024-01-11 DIAGNOSIS — M17.12 PRIMARY OSTEOARTHRITIS OF LEFT KNEE: Primary | ICD-10-CM

## 2024-01-11 RX ORDER — HYALURONATE SODIUM, STABILIZED 60 MG/3 ML
60 SYRINGE (ML) INTRAARTICULAR ONCE
Qty: 3 ML | Refills: 0 | Status: SHIPPED | OUTPATIENT
Start: 2024-01-11 | End: 2024-01-16 | Stop reason: ALTCHOICE

## 2024-01-11 NOTE — TELEPHONE ENCOUNTER
Patient's left knee Durolane injections were approved.  Please sign to send to  Specialty pharmacy.

## 2024-01-15 ENCOUNTER — SPECIALTY PHARMACY (OUTPATIENT)
Dept: PHARMACY | Facility: CLINIC | Age: 74
End: 2024-01-15

## 2024-01-16 ENCOUNTER — OFFICE VISIT (OUTPATIENT)
Dept: PRIMARY CARE | Facility: CLINIC | Age: 74
End: 2024-01-16
Payer: COMMERCIAL

## 2024-01-16 VITALS — DIASTOLIC BLOOD PRESSURE: 62 MMHG | OXYGEN SATURATION: 98 % | SYSTOLIC BLOOD PRESSURE: 119 MMHG | HEART RATE: 93 BPM

## 2024-01-16 DIAGNOSIS — I10 PRIMARY HYPERTENSION: ICD-10-CM

## 2024-01-16 DIAGNOSIS — E03.9 ACQUIRED HYPOTHYROIDISM: ICD-10-CM

## 2024-01-16 DIAGNOSIS — E78.2 MIXED HYPERLIPIDEMIA: Primary | ICD-10-CM

## 2024-01-16 DIAGNOSIS — E55.9 VITAMIN D DEFICIENCY: ICD-10-CM

## 2024-01-16 DIAGNOSIS — R73.9 ELEVATED BLOOD SUGAR: ICD-10-CM

## 2024-01-16 DIAGNOSIS — G25.81 RLS (RESTLESS LEGS SYNDROME): ICD-10-CM

## 2024-01-16 PROCEDURE — 1036F TOBACCO NON-USER: CPT | Performed by: FAMILY MEDICINE

## 2024-01-16 PROCEDURE — 1126F AMNT PAIN NOTED NONE PRSNT: CPT | Performed by: FAMILY MEDICINE

## 2024-01-16 PROCEDURE — 1159F MED LIST DOCD IN RCRD: CPT | Performed by: FAMILY MEDICINE

## 2024-01-16 PROCEDURE — 1160F RVW MEDS BY RX/DR IN RCRD: CPT | Performed by: FAMILY MEDICINE

## 2024-01-16 PROCEDURE — 99214 OFFICE O/P EST MOD 30 MIN: CPT | Performed by: FAMILY MEDICINE

## 2024-01-16 PROCEDURE — 3074F SYST BP LT 130 MM HG: CPT | Performed by: FAMILY MEDICINE

## 2024-01-16 PROCEDURE — 3078F DIAST BP <80 MM HG: CPT | Performed by: FAMILY MEDICINE

## 2024-01-16 PROCEDURE — RXMED WILLOW AMBULATORY MEDICATION CHARGE

## 2024-01-16 RX ORDER — ROPINIROLE 3 MG/1
3 TABLET, FILM COATED ORAL NIGHTLY
Qty: 90 TABLET | Refills: 1 | Status: SHIPPED | OUTPATIENT
Start: 2024-01-16 | End: 2024-06-05 | Stop reason: SDUPTHER

## 2024-01-16 RX ORDER — CHLORTHALIDONE 50 MG/1
50 TABLET ORAL DAILY
Qty: 90 TABLET | Refills: 1 | Status: SHIPPED | OUTPATIENT
Start: 2024-01-16

## 2024-01-16 ASSESSMENT — PATIENT HEALTH QUESTIONNAIRE - PHQ9
2. FEELING DOWN, DEPRESSED OR HOPELESS: NOT AT ALL
SUM OF ALL RESPONSES TO PHQ9 QUESTIONS 1 AND 2: 0
1. LITTLE INTEREST OR PLEASURE IN DOING THINGS: NOT AT ALL

## 2024-01-16 NOTE — PROGRESS NOTES
Subjective   Patient ID: Amanda Matson is a 73 y.o. female who presents for Follow-up.    HPI   The patient reports that she is taking rosuvastatin and fenofibrate for hyperlipidemia, chlorthalidone for hypertension, levothyroxine for hypothyroidism, Trazodone for insomnia and Trospium for stress incontinence. She is taking these medications as prescribed and has no side effects from them.Her BP is well- controlled at this time. She mentions that the current dose of Requip does not control her leg cramps well.     Review of Systems  Constitutional: No fever or chills  Cardiovascular: no chest pain, no palpitations and no syncope.   Respiratory: no cough, no shortness of breath during exertion and no shortness of breath at rest.   Gastrointestinal: no abdominal pain, no nausea and no vomiting.  Neuro: No Headache, no dizziness    Objective   /62   Pulse 93   SpO2 98%     Physical Exam  Constitutional: Alert and in no acute distress. Well developed, well nourished  Head and Face: Head and face: Normal.    Cardiovascular: Heart rate and rhythm were normal, normal S1 and S2. No peripheral edema.   Pulmonary: No respiratory distress. Clear bilateral breath sounds.  Musculoskeletal: Gait and station: Normal. Muscle strength/tone: Normal.   Skin: Normal skin color and pigmentation, normal skin turgor, and no rash.    Psychiatric: Judgment and insight: Intact. Mood and affect: Normal.    Lab Results   Component Value Date    WBC 5.0 12/08/2023    HGB 13.0 12/08/2023    HCT 40.4 12/08/2023     12/08/2023    CHOL 169 12/08/2023    TRIG 126 12/08/2023    HDL 60.9 12/08/2023    ALT 12 12/08/2023    AST 19 12/08/2023     12/21/2023    K 4.0 12/21/2023     12/21/2023    CREATININE 0.97 12/21/2023    BUN 25 (H) 12/21/2023    CO2 30 12/21/2023    TSH 0.50 06/05/2023    INR 1.0 05/16/2018       BI mammo bilateral screening tomosynthesis  Narrative: Interpreted By:  Luis A Reina,   STUDY:  BI MAMMO  BILATERAL SCREENING TOMOSYNTHESIS;  1/8/2024 10:40 am      ACCESSION NUMBER(S):  [id number]      ORDERING CLINICIAN:  KEON SCHMID      INDICATION:  Screening.      Based on the Tyrer-Cuzick model for breast cancer risk assessment,  the patient's lifetime risk of breast cancer is 4%.      COMPARISON:  2022, 2021, 2020, 2019, 2018      FINDINGS:  2D and tomosynthesis images were reviewed at 1 mm slice thickness.      Density:  There are areas of scattered fibroglandular tissue.      No suspicious masses or calcifications are identified.  Stable benign  densities are present bilaterally. There is no interval change when  compared to the previous examination.      Impression: No mammographic evidence of malignancy.      BI-RADS CATEGORY:      BI-RADS Category:  2 Benign.  Recommendation:  Routine Screening Mammogram in 1 Year.  Recommended Date:  1 Year.  Laterality:  Bilateral.      For any future breast imaging appointments, please call [phone]  [phone]). The patient has been entered into a computer reminder system  with a target due date for the next exam.          MACRO:  None      Signed by: Luis A Reina 1/8/2024 4:00 PM  Dictation workstation:   [id number]      Assessment/Plan   Diagnoses and all orders for this visit:  Mixed hyperlipidemia  -     Lipid Panel; Future  Primary hypertension  -     Follow Up In [facility] - PCP - Established  -     chlorthalidone (Hygroton) 50 mg tablet; Take 1 tablet (50 mg) by mouth once daily.  -     Follow Up In [facility] - PCP - Health Maintenance; Future  -     CBC; Future  -     Albumin , Urine Random; Future  -     Comprehensive Metabolic Panel; Future  RLS (restless legs syndrome)  -     rOPINIRole (Requip) 3 mg tablet; Take 1 tablet (3 mg) by mouth once daily at bedtime.  Acquired hypothyroidism  -     TSH with reflex to Free T4 if abnormal; Future  Vitamin D deficiency  -     Vitamin D 25-Hydroxy,Total (for eval of Vitamin D levels);  Future  -     Vitamin B12; Future  Elevated blood sugar  -     Hemoglobin A1C; Future        Dear Amanda Matson     It was my pleasure to take care of you today in the office. Below are the things we discussed today:    1.   Hyperlipidemia: Well- controlled. Continue rosuvastatin and fenofibrate.      2. Hypertension: Well- controlled. Continue chlorthalidone.     3. RLS: Increase Requip to 3 mg.      4. Hypothyroidism: Continue levothyroxine.     5. Insomnia: Continue Trazodone.     6. Stress incontinence: Continue Tropsium.     7. Oral hydration: The patient will try liquid iv.    Your yearly Physical is due in: July 2024   When you call the office for your yearly Physical, please ask them to inform me to order your blood work, so that you can get the fasting blood work before your appointment and we can discuss the results at your physical.      Please call me if any questions arise from now until your next visit. I will call you after I am done seeing patients. A Doctor is always available by phone when the office is closed. Please feel free to call for help with any problem that you feel shouldn't wait until the office re-opens.     Scribe Attestation  By signing my name below, IMiranda, Alexus   attest that this documentation has been prepared under the direction and in the presence of Kathryn Dunham MD.

## 2024-01-19 ENCOUNTER — PHARMACY VISIT (OUTPATIENT)
Dept: PHARMACY | Facility: CLINIC | Age: 74
End: 2024-01-19
Payer: COMMERCIAL

## 2024-01-23 ENCOUNTER — SPECIALTY PHARMACY (OUTPATIENT)
Dept: PHARMACY | Facility: CLINIC | Age: 74
End: 2024-01-23

## 2024-01-23 DIAGNOSIS — M17.12 PRIMARY OSTEOARTHRITIS OF LEFT KNEE: Primary | ICD-10-CM

## 2024-01-23 RX ORDER — HYALURONATE SODIUM, STABILIZED 60 MG/3 ML
60 SYRINGE (ML) INTRAARTICULAR ONCE
Qty: 3 ML | Refills: 0 | Status: SHIPPED | OUTPATIENT
Start: 2024-01-23 | End: 2024-02-03

## 2024-01-23 NOTE — TELEPHONE ENCOUNTER
Please sign and send to  specialty pharmacy for Durolane for left knee. According to the pharmacy when another user discontinues the medication in chart review (even if they are not associated with the order) it makes the prescription inactive for the pharmacy to use. Med was discontinued by Kathryn Dunham on 1/16/24.

## 2024-01-24 PROCEDURE — RXMED WILLOW AMBULATORY MEDICATION CHARGE

## 2024-02-02 ENCOUNTER — PHARMACY VISIT (OUTPATIENT)
Dept: PHARMACY | Facility: CLINIC | Age: 74
End: 2024-02-02
Payer: COMMERCIAL

## 2024-02-19 PROCEDURE — RXMED WILLOW AMBULATORY MEDICATION CHARGE

## 2024-02-22 ENCOUNTER — PHARMACY VISIT (OUTPATIENT)
Dept: PHARMACY | Facility: CLINIC | Age: 74
End: 2024-02-22
Payer: COMMERCIAL

## 2024-03-28 PROCEDURE — RXMED WILLOW AMBULATORY MEDICATION CHARGE

## 2024-03-29 ENCOUNTER — PHARMACY VISIT (OUTPATIENT)
Dept: PHARMACY | Facility: CLINIC | Age: 74
End: 2024-03-29
Payer: COMMERCIAL

## 2024-04-09 ENCOUNTER — PHARMACY VISIT (OUTPATIENT)
Dept: PHARMACY | Facility: CLINIC | Age: 74
End: 2024-04-09
Payer: COMMERCIAL

## 2024-04-09 PROCEDURE — RXMED WILLOW AMBULATORY MEDICATION CHARGE

## 2024-04-09 RX ORDER — CHLORHEXIDINE GLUCONATE ORAL RINSE 1.2 MG/ML
SOLUTION DENTAL
Qty: 473 ML | Refills: 0 | OUTPATIENT
Start: 2024-04-09 | End: 2024-04-23 | Stop reason: ALTCHOICE

## 2024-04-09 RX ORDER — AMOXICILLIN 500 MG/1
CAPSULE ORAL
Qty: 30 CAPSULE | Refills: 0 | OUTPATIENT
Start: 2024-04-09 | End: 2024-05-20 | Stop reason: ALTCHOICE

## 2024-04-11 PROCEDURE — RXMED WILLOW AMBULATORY MEDICATION CHARGE

## 2024-04-12 ENCOUNTER — PHARMACY VISIT (OUTPATIENT)
Dept: PHARMACY | Facility: CLINIC | Age: 74
End: 2024-04-12
Payer: COMMERCIAL

## 2024-04-23 ENCOUNTER — OFFICE VISIT (OUTPATIENT)
Dept: PRIMARY CARE | Facility: CLINIC | Age: 74
End: 2024-04-23
Payer: COMMERCIAL

## 2024-04-23 ENCOUNTER — LAB (OUTPATIENT)
Dept: LAB | Facility: LAB | Age: 74
End: 2024-04-23
Payer: COMMERCIAL

## 2024-04-23 VITALS
HEIGHT: 64 IN | HEART RATE: 68 BPM | BODY MASS INDEX: 25.78 KG/M2 | SYSTOLIC BLOOD PRESSURE: 135 MMHG | DIASTOLIC BLOOD PRESSURE: 80 MMHG | WEIGHT: 151 LBS | OXYGEN SATURATION: 99 %

## 2024-04-23 DIAGNOSIS — R10.9 RIGHT FLANK PAIN: ICD-10-CM

## 2024-04-23 DIAGNOSIS — R10.9 RIGHT FLANK PAIN: Primary | ICD-10-CM

## 2024-04-23 LAB
APPEARANCE UR: CLEAR
BACTERIA #/AREA URNS AUTO: ABNORMAL /HPF
BILIRUB UR STRIP.AUTO-MCNC: NEGATIVE MG/DL
COLOR UR: ABNORMAL
GLUCOSE UR STRIP.AUTO-MCNC: NORMAL MG/DL
KETONES UR STRIP.AUTO-MCNC: NEGATIVE MG/DL
LEUKOCYTE ESTERASE UR QL STRIP.AUTO: ABNORMAL
NITRITE UR QL STRIP.AUTO: NEGATIVE
PH UR STRIP.AUTO: 7.5 [PH]
PROT UR STRIP.AUTO-MCNC: NEGATIVE MG/DL
RBC # UR STRIP.AUTO: NEGATIVE /UL
RBC #/AREA URNS AUTO: ABNORMAL /HPF
SP GR UR STRIP.AUTO: 1.01
UROBILINOGEN UR STRIP.AUTO-MCNC: NORMAL MG/DL
WBC #/AREA URNS AUTO: ABNORMAL /HPF

## 2024-04-23 PROCEDURE — 99213 OFFICE O/P EST LOW 20 MIN: CPT | Performed by: FAMILY MEDICINE

## 2024-04-23 PROCEDURE — 1125F AMNT PAIN NOTED PAIN PRSNT: CPT | Performed by: FAMILY MEDICINE

## 2024-04-23 PROCEDURE — 1160F RVW MEDS BY RX/DR IN RCRD: CPT | Performed by: FAMILY MEDICINE

## 2024-04-23 PROCEDURE — 3075F SYST BP GE 130 - 139MM HG: CPT | Performed by: FAMILY MEDICINE

## 2024-04-23 PROCEDURE — 3079F DIAST BP 80-89 MM HG: CPT | Performed by: FAMILY MEDICINE

## 2024-04-23 PROCEDURE — 1159F MED LIST DOCD IN RCRD: CPT | Performed by: FAMILY MEDICINE

## 2024-04-23 PROCEDURE — 81001 URINALYSIS AUTO W/SCOPE: CPT

## 2024-04-23 PROCEDURE — 1036F TOBACCO NON-USER: CPT | Performed by: FAMILY MEDICINE

## 2024-04-23 PROCEDURE — 87086 URINE CULTURE/COLONY COUNT: CPT

## 2024-04-23 ASSESSMENT — PAIN SCALES - GENERAL: PAINLEVEL: 2

## 2024-04-23 NOTE — PROGRESS NOTES
"Subjective   Patient ID: Amanda Matson is a 73 y.o. female who presents for Back Pain (Right Side).    HPI   The patient reports that she has been experiencing right sided flank pain which started 1 week ago. She has not tried treating it with any over-the-counter medications and adds that it is worse in the morning and is intermittent throughout the day with movement. She states that she has been lifting heavy objects and is unsure if this caused it.     Review of Systems  Constitutional: No fever or chills  Cardiovascular: no chest pain, no palpitations and no syncope.   Respiratory: no cough, no shortness of breath during exertion and no shortness of breath at rest.   Gastrointestinal: no abdominal pain, no nausea and no vomiting.  Neuro: No Headache, no dizziness  MSK: + right flank pain    Objective   /80   Pulse 68   Ht 1.626 m (5' 4\")   Wt 68.5 kg (151 lb)   SpO2 99%   BMI 25.92 kg/m²     Physical Exam  Constitutional: Alert and in no acute distress. Well developed, well nourished  Head and Face: Head and face: Normal.    Cardiovascular: Heart rate and rhythm were normal, normal S1 and S2. No peripheral edema.   Pulmonary: No respiratory distress. Clear bilateral breath sounds.  Musculoskeletal: Gait and station: Normal. Muscle strength/tone: Normal.   Skin: Normal skin color and pigmentation, normal skin turgor, and no rash.    Psychiatric: Judgment and insight: Intact. Mood and affect: Normal.    Lab Results   Component Value Date    WBC 5.0 12/08/2023    HGB 13.0 12/08/2023    HCT 40.4 12/08/2023     12/08/2023    CHOL 169 12/08/2023    TRIG 126 12/08/2023    HDL 60.9 12/08/2023    ALT 12 12/08/2023    AST 19 12/08/2023     12/21/2023    K 4.0 12/21/2023     12/21/2023    CREATININE 0.97 12/21/2023    BUN 25 (H) 12/21/2023    CO2 30 12/21/2023    TSH 0.50 06/05/2023    INR 1.0 05/16/2018       BI mammo bilateral screening tomosynthesis  Narrative: Interpreted By:  Nuno " Luis A,   STUDY:  BI MAMMO BILATERAL SCREENING TOMOSYNTHESIS;  1/8/2024 10:40 am      ACCESSION NUMBER(S):  [id number]      ORDERING CLINICIAN:  KEON SCHMID      INDICATION:  Screening.      Based on the Tyrer-Cuzick model for breast cancer risk assessment,  the patient's lifetime risk of breast cancer is 4%.      COMPARISON:  2022, 2021, 2020, 2019, 2018      FINDINGS:  2D and tomosynthesis images were reviewed at 1 mm slice thickness.      Density:  There are areas of scattered fibroglandular tissue.      No suspicious masses or calcifications are identified.  Stable benign  densities are present bilaterally. There is no interval change when  compared to the previous examination.      Impression: No mammographic evidence of malignancy.      BI-RADS CATEGORY:      BI-RADS Category:  2 Benign.  Recommendation:  Routine Screening Mammogram in 1 Year.  Recommended Date:  1 Year.  Laterality:  Bilateral.      For any future breast imaging appointments, please call [phone]  [phone]). The patient has been entered into a computer reminder system  with a target due date for the next exam.          MACRO:  None      Signed by: Luis A Reina 1/8/2024 4:00 PM  Dictation workstation:   [id number]      Assessment/Plan   Diagnoses and all orders for this visit:  Right flank pain  -     Urinalysis with Reflex Microscopic; Future  -     Urine Culture; Future  -     US renal complete; Future        Dear Amanda Matson     It was my pleasure to take care of you today in the office. Below are the things we discussed today:    1. Right flank pain: Urinalysis ordered. If this test is positive for blood the patient will schedule a renal ultrasound.     Your yearly Physical is due in: July 2024  When you call the office for your yearly Physical, please ask them to inform me to order your blood work, so that you can get the fasting blood work before your appointment and we can discuss the results at your physical.      Please call  me if any questions arise from now until your next visit. I will call you after I am done seeing patients. A Doctor is always available by phone when the office is closed. Please feel free to call for help with any problem that you feel shouldn't wait until the office re-opens.     Scribe Attestation  By signing my name below, IMiranda, Scrbeverley   attest that this documentation has been prepared under the direction and in the presence of Kathryn Dunham MD.

## 2024-04-24 ENCOUNTER — PHARMACY VISIT (OUTPATIENT)
Dept: PHARMACY | Facility: CLINIC | Age: 74
End: 2024-04-24
Payer: COMMERCIAL

## 2024-04-24 DIAGNOSIS — N30.00 ACUTE CYSTITIS WITHOUT HEMATURIA: Primary | ICD-10-CM

## 2024-04-24 LAB — BACTERIA UR CULT: NORMAL

## 2024-04-24 PROCEDURE — RXMED WILLOW AMBULATORY MEDICATION CHARGE

## 2024-04-24 RX ORDER — CEPHALEXIN 500 MG/1
500 CAPSULE ORAL 2 TIMES DAILY
Qty: 14 CAPSULE | Refills: 0 | Status: SHIPPED | OUTPATIENT
Start: 2024-04-24 | End: 2024-05-01

## 2024-04-25 ENCOUNTER — OFFICE VISIT (OUTPATIENT)
Dept: ORTHOPEDIC SURGERY | Facility: HOSPITAL | Age: 74
End: 2024-04-25
Payer: COMMERCIAL

## 2024-04-25 VITALS — HEIGHT: 65 IN | WEIGHT: 152 LBS | BODY MASS INDEX: 25.33 KG/M2

## 2024-04-25 DIAGNOSIS — M17.12 PRIMARY OSTEOARTHRITIS OF LEFT KNEE: Primary | ICD-10-CM

## 2024-04-25 PROCEDURE — 2500000005 HC RX 250 GENERAL PHARMACY W/O HCPCS: Performed by: FAMILY MEDICINE

## 2024-04-25 PROCEDURE — 99213 OFFICE O/P EST LOW 20 MIN: CPT | Performed by: FAMILY MEDICINE

## 2024-04-25 PROCEDURE — 1160F RVW MEDS BY RX/DR IN RCRD: CPT | Performed by: FAMILY MEDICINE

## 2024-04-25 PROCEDURE — 1159F MED LIST DOCD IN RCRD: CPT | Performed by: FAMILY MEDICINE

## 2024-04-25 PROCEDURE — 2500000004 HC RX 250 GENERAL PHARMACY W/ HCPCS (ALT 636 FOR OP/ED): Performed by: FAMILY MEDICINE

## 2024-04-25 PROCEDURE — 1036F TOBACCO NON-USER: CPT | Performed by: FAMILY MEDICINE

## 2024-04-25 PROCEDURE — 1125F AMNT PAIN NOTED PAIN PRSNT: CPT | Performed by: FAMILY MEDICINE

## 2024-04-25 PROCEDURE — 20610 DRAIN/INJ JOINT/BURSA W/O US: CPT | Performed by: FAMILY MEDICINE

## 2024-04-25 RX ORDER — LIDOCAINE HYDROCHLORIDE 10 MG/ML
4 INJECTION INFILTRATION; PERINEURAL
Status: COMPLETED | OUTPATIENT
Start: 2024-04-25 | End: 2024-04-25

## 2024-04-25 RX ORDER — TRIAMCINOLONE ACETONIDE 40 MG/ML
40 INJECTION, SUSPENSION INTRA-ARTICULAR; INTRAMUSCULAR
Status: COMPLETED | OUTPATIENT
Start: 2024-04-25 | End: 2024-04-25

## 2024-04-25 RX ADMIN — TRIAMCINOLONE ACETONIDE 40 MG: 400 INJECTION, SUSPENSION INTRA-ARTICULAR; INTRAMUSCULAR at 13:30

## 2024-04-25 RX ADMIN — LIDOCAINE HYDROCHLORIDE 4 ML: 10 INJECTION, SOLUTION INFILTRATION; PERINEURAL at 13:30

## 2024-04-25 ASSESSMENT — PAIN SCALES - GENERAL: PAINLEVEL_OUTOF10: 6

## 2024-04-25 ASSESSMENT — PAIN - FUNCTIONAL ASSESSMENT: PAIN_FUNCTIONAL_ASSESSMENT: 0-10

## 2024-04-25 ASSESSMENT — PAIN DESCRIPTION - DESCRIPTORS: DESCRIPTORS: DULL

## 2024-04-25 NOTE — PROGRESS NOTES
** Please excuse any errors in grammar or translation related to this dictation. Voice recognition software was utilized to prepare this document. **    Assessment & Plan:  Patient here for ongoing management of left knee OA. Continue to encourage strength training in addition to cardio to optimize function of surrounding joint musculature.   Given previous positive response to steroid injection, offered to repeat today which patient was agreeable to have completed.  Can have this repeated in 3 or more months as dictated by symptoms.  We discussed hyaluronic acid injections at last visit and patient plans to follow-up on her return from vacation to have these performed.  All questions answered and patient agrees plan of care.      Chief complaint:  Left knee pain    HPI:  4/25/24: Patient states she had about 4 months of relief with CSI. She is still having some relief post injection, but is leaving for a trip to [facility] and requests repeat injection to prevent pain while on vacation.    12/12/23: 74 y/o female patient, hx of right knee total arthroplasty in 2010, presents with left knee pain.  This complaint has been ongoing for a year.  No mechanism of injury reported at onset. Symptoms have progressively worsened with time.  Pain is most prominent at medial knee.  Swelling present intermittently. It is associated with sensation of stiffness, loss of joint motion, crackling/popping sensation.   Symptoms are aggravated by sitting to standing, changing positions, knee flexion,. To date, patient has tried a variety of treatments to include Tylenol, tens unit, cortisone injection. Previously saw Shalom Stahl PA-C for this with last visit being 5/12/23. Last steroid injection completed 5/12/23. She wanted to pursue having a viscosupplement injection opposed to steroid and was referred here for this.     Exam:  LEFT Knee examined. No effusion, ecchymosis, or erythema.  AROM from 5 to 120 deg with 5/5 strength. SILT overlying  knee. Motion crepitus present. Tenderness along medial joint line.  No popliteal mass palpated. Negative anterior and posterior drawer.  No laxity to varus or valgus stress at 0 or 30 deg.  No patellar apprehension.      Results:  X-rays of left knee obtained September 2022: mild arthritic changes.    Procedure:  Patient ID: Amanda Matson is a 73 y.o. female.    L Inj/Asp: L knee on 4/25/2024 1:30 PM  Indications: pain  Details: 25 G needle, anterolateral approach  Medications: 40 mg triamcinolone acetonide 40 mg/mL; 4 mL lidocaine 10 mg/mL (1 %)  Outcome: tolerated well, no immediate complications    Procedure risk factors to include increased pain, bleeding, infection, neurovascular injury, soft tissue injury, transient elevation of blood glucose and blood pressure, and adverse reaction to medication were discussed with the patient. Patient understands there is a moderate risk of morbidity from undergoing the procedure.  Procedure, treatment alternatives, risks and benefits explained, specific risks discussed. Consent was given by the patient. Immediately prior to procedure a time out was called to verify the correct patient, procedure, equipment, support staff and site/side marked as required. Patient was prepped and draped in the usual sterile fashion.

## 2024-04-29 ENCOUNTER — PATIENT MESSAGE (OUTPATIENT)
Dept: PRIMARY CARE | Facility: CLINIC | Age: 74
End: 2024-04-29
Payer: COMMERCIAL

## 2024-04-29 DIAGNOSIS — R10.9 RIGHT FLANK PAIN: Primary | ICD-10-CM

## 2024-05-02 ENCOUNTER — PHARMACY VISIT (OUTPATIENT)
Dept: PHARMACY | Facility: CLINIC | Age: 74
End: 2024-05-02
Payer: COMMERCIAL

## 2024-05-02 PROCEDURE — RXMED WILLOW AMBULATORY MEDICATION CHARGE

## 2024-05-02 RX ORDER — CEPHALEXIN 500 MG/1
500 CAPSULE ORAL 2 TIMES DAILY
Qty: 14 CAPSULE | Refills: 0 | Status: SHIPPED | OUTPATIENT
Start: 2024-05-02 | End: 2024-05-09

## 2024-05-03 ENCOUNTER — HOSPITAL ENCOUNTER (OUTPATIENT)
Dept: RADIOLOGY | Facility: HOSPITAL | Age: 74
Discharge: HOME | End: 2024-05-03
Payer: COMMERCIAL

## 2024-05-03 DIAGNOSIS — R10.9 RIGHT FLANK PAIN: ICD-10-CM

## 2024-05-03 PROCEDURE — 76770 US EXAM ABDO BACK WALL COMP: CPT

## 2024-05-03 PROCEDURE — 76770 US EXAM ABDO BACK WALL COMP: CPT | Performed by: STUDENT IN AN ORGANIZED HEALTH CARE EDUCATION/TRAINING PROGRAM

## 2024-05-04 ENCOUNTER — APPOINTMENT (OUTPATIENT)
Dept: RADIOLOGY | Facility: HOSPITAL | Age: 74
End: 2024-05-04
Payer: COMMERCIAL

## 2024-05-04 ENCOUNTER — HOSPITAL ENCOUNTER (EMERGENCY)
Facility: HOSPITAL | Age: 74
Discharge: HOME | End: 2024-05-04
Attending: EMERGENCY MEDICINE
Payer: COMMERCIAL

## 2024-05-04 ENCOUNTER — PHARMACY VISIT (OUTPATIENT)
Dept: PHARMACY | Facility: CLINIC | Age: 74
End: 2024-05-04
Payer: COMMERCIAL

## 2024-05-04 VITALS
DIASTOLIC BLOOD PRESSURE: 79 MMHG | BODY MASS INDEX: 25.33 KG/M2 | OXYGEN SATURATION: 97 % | HEIGHT: 65 IN | SYSTOLIC BLOOD PRESSURE: 141 MMHG | RESPIRATION RATE: 16 BRPM | HEART RATE: 69 BPM | WEIGHT: 152 LBS | TEMPERATURE: 97.4 F

## 2024-05-04 DIAGNOSIS — N20.0 BILATERAL NEPHROLITHIASIS: ICD-10-CM

## 2024-05-04 DIAGNOSIS — K57.92 ACUTE DIVERTICULITIS: Primary | ICD-10-CM

## 2024-05-04 LAB
ALBUMIN SERPL BCP-MCNC: 4 G/DL (ref 3.4–5)
ALP SERPL-CCNC: 51 U/L (ref 33–136)
ALT SERPL W P-5'-P-CCNC: 13 U/L (ref 7–45)
ANION GAP SERPL CALC-SCNC: 14 MMOL/L (ref 10–20)
APPEARANCE UR: CLEAR
AST SERPL W P-5'-P-CCNC: 23 U/L (ref 9–39)
BASOPHILS # BLD AUTO: 0.03 X10*3/UL (ref 0–0.1)
BASOPHILS NFR BLD AUTO: 0.4 %
BILIRUB SERPL-MCNC: 0.8 MG/DL (ref 0–1.2)
BILIRUB UR STRIP.AUTO-MCNC: NEGATIVE MG/DL
BUN SERPL-MCNC: 12 MG/DL (ref 6–23)
CALCIUM SERPL-MCNC: 9.2 MG/DL (ref 8.6–10.6)
CHLORIDE SERPL-SCNC: 100 MMOL/L (ref 98–107)
CO2 SERPL-SCNC: 25 MMOL/L (ref 21–32)
COLOR UR: COLORLESS
CREAT SERPL-MCNC: 0.7 MG/DL (ref 0.5–1.05)
EGFRCR SERPLBLD CKD-EPI 2021: >90 ML/MIN/1.73M*2
EOSINOPHIL # BLD AUTO: 0.12 X10*3/UL (ref 0–0.4)
EOSINOPHIL NFR BLD AUTO: 1.7 %
ERYTHROCYTE [DISTWIDTH] IN BLOOD BY AUTOMATED COUNT: 12.2 % (ref 11.5–14.5)
GLUCOSE SERPL-MCNC: 94 MG/DL (ref 74–99)
GLUCOSE UR STRIP.AUTO-MCNC: NORMAL MG/DL
HCT VFR BLD AUTO: 34.6 % (ref 36–46)
HGB BLD-MCNC: 12 G/DL (ref 12–16)
HOLD SPECIMEN: NORMAL
IMM GRANULOCYTES # BLD AUTO: 0.03 X10*3/UL (ref 0–0.5)
IMM GRANULOCYTES NFR BLD AUTO: 0.4 % (ref 0–0.9)
KETONES UR STRIP.AUTO-MCNC: NEGATIVE MG/DL
LEUKOCYTE ESTERASE UR QL STRIP.AUTO: ABNORMAL
LYMPHOCYTES # BLD AUTO: 1.4 X10*3/UL (ref 0.8–3)
LYMPHOCYTES NFR BLD AUTO: 20.3 %
MCH RBC QN AUTO: 28 PG (ref 26–34)
MCHC RBC AUTO-ENTMCNC: 34.7 G/DL (ref 32–36)
MCV RBC AUTO: 81 FL (ref 80–100)
MONOCYTES # BLD AUTO: 0.49 X10*3/UL (ref 0.05–0.8)
MONOCYTES NFR BLD AUTO: 7.1 %
NEUTROPHILS # BLD AUTO: 4.83 X10*3/UL (ref 1.6–5.5)
NEUTROPHILS NFR BLD AUTO: 70.1 %
NITRITE UR QL STRIP.AUTO: NEGATIVE
NRBC BLD-RTO: 0 /100 WBCS (ref 0–0)
PH UR STRIP.AUTO: 7.5 [PH]
PLATELET # BLD AUTO: 347 X10*3/UL (ref 150–450)
POTASSIUM SERPL-SCNC: 3.2 MMOL/L (ref 3.5–5.3)
PROT SERPL-MCNC: 6.7 G/DL (ref 6.4–8.2)
PROT UR STRIP.AUTO-MCNC: NEGATIVE MG/DL
RBC # BLD AUTO: 4.29 X10*6/UL (ref 4–5.2)
RBC # UR STRIP.AUTO: NEGATIVE /UL
RBC #/AREA URNS AUTO: NORMAL /HPF
SODIUM SERPL-SCNC: 136 MMOL/L (ref 136–145)
SP GR UR STRIP.AUTO: 1
UROBILINOGEN UR STRIP.AUTO-MCNC: NORMAL MG/DL
WBC # BLD AUTO: 6.9 X10*3/UL (ref 4.4–11.3)
WBC #/AREA URNS AUTO: NORMAL /HPF

## 2024-05-04 PROCEDURE — 81001 URINALYSIS AUTO W/SCOPE: CPT

## 2024-05-04 PROCEDURE — 80053 COMPREHEN METABOLIC PANEL: CPT

## 2024-05-04 PROCEDURE — 87086 URINE CULTURE/COLONY COUNT: CPT

## 2024-05-04 PROCEDURE — 74176 CT ABD & PELVIS W/O CONTRAST: CPT

## 2024-05-04 PROCEDURE — 85025 COMPLETE CBC W/AUTO DIFF WBC: CPT

## 2024-05-04 PROCEDURE — 99284 EMERGENCY DEPT VISIT MOD MDM: CPT | Mod: 25

## 2024-05-04 PROCEDURE — 36415 COLL VENOUS BLD VENIPUNCTURE: CPT

## 2024-05-04 PROCEDURE — 99284 EMERGENCY DEPT VISIT MOD MDM: CPT | Performed by: EMERGENCY MEDICINE

## 2024-05-04 PROCEDURE — RXMED WILLOW AMBULATORY MEDICATION CHARGE

## 2024-05-04 PROCEDURE — 74176 CT ABD & PELVIS W/O CONTRAST: CPT | Performed by: STUDENT IN AN ORGANIZED HEALTH CARE EDUCATION/TRAINING PROGRAM

## 2024-05-04 RX ORDER — AMOXICILLIN AND CLAVULANATE POTASSIUM 875; 125 MG/1; MG/1
1 TABLET, FILM COATED ORAL EVERY 8 HOURS
Qty: 42 TABLET | Refills: 0 | Status: SHIPPED | OUTPATIENT
Start: 2024-05-04 | End: 2024-05-20 | Stop reason: ALTCHOICE

## 2024-05-04 RX ORDER — ACETAMINOPHEN 325 MG/1
975 TABLET ORAL EVERY 8 HOURS PRN
Status: DISCONTINUED | OUTPATIENT
Start: 2024-05-04 | End: 2024-05-04 | Stop reason: HOSPADM

## 2024-05-04 ASSESSMENT — PAIN - FUNCTIONAL ASSESSMENT: PAIN_FUNCTIONAL_ASSESSMENT: 0-10

## 2024-05-04 ASSESSMENT — PAIN SCALES - GENERAL: PAINLEVEL_OUTOF10: 6

## 2024-05-04 ASSESSMENT — COLUMBIA-SUICIDE SEVERITY RATING SCALE - C-SSRS
2. HAVE YOU ACTUALLY HAD ANY THOUGHTS OF KILLING YOURSELF?: NO
1. IN THE PAST MONTH, HAVE YOU WISHED YOU WERE DEAD OR WISHED YOU COULD GO TO SLEEP AND NOT WAKE UP?: NO
6. HAVE YOU EVER DONE ANYTHING, STARTED TO DO ANYTHING, OR PREPARED TO DO ANYTHING TO END YOUR LIFE?: NO

## 2024-05-04 NOTE — ED PROVIDER NOTES
HPI   Chief Complaint   Patient presents with    Flank Pain       HPI  Amanda Matson is a 73 year old female with hx of mixed HLD, HTN, RLS, hypothyroidism, and vitamin D deficiency who presents with 10 days of bilateral flank pain with associated urinary urgency and frequency. Bilateral flank pain started 10 days ago. Was seen by primary care on 4/23 for flank pain. Had UA and renal ultrasound done. UA was positive for LE and 1-5 WBC. Prescribed 7 day course of keflex which she just finished. States that pain improved while on antibiotics, but has returned since she finished her course. Renal ultrasound with small echogenic foci thought bilateral kidneys, measuring up to 0.4 cm on the right and 0.5 cm on the left, representing possible non obstructing renal calculi.  Also reporting some intermittent nausea, but no vomiting. Continues to have good PO intake. Does report occasional constipation. No hematuria or dysuria. No change in vaginal discharge. No new sexual partners. Denies sick contacts. Denies fever, chills, shortness of breath, chest pain, abdominal pain, leg swelling, numbness/tingling of her extremities.       [place] Coma Scale Score: 15      Patient History   Past Medical History:   Diagnosis Date    Encounter for fitting and adjustment of other specified devices 07/22/2018    Pessary maintenance    Encounter for general adult medical examination without abnormal findings 06/14/2021    Preventative health care    Other abnormal glucose 12/16/2016    Elevated glucose    Other specified complication of genitourinary prosthetic devices, implants and grafts, initial encounter (CMS-[facility]) 07/22/2018    Vaginal erosion secondary to pessary use, initial encounter    Pain in right foot 11/06/2017    Foot pain, bilateral    Pain in unspecified foot 06/30/2021    Foot pain    Personal history of other diseases of the circulatory system 11/18/2016    History of abnormal electrocardiography    Personal history of  other diseases of the female genital tract 06/04/2020    History of uterine prolapse    Personal history of other diseases of the female genital tract 05/12/2016    History of postmenopausal bleeding    Personal history of other diseases of the nervous system and sense organs 03/22/2017    History of bacterial conjunctivitis    Personal history of other diseases of the respiratory system 11/19/2021    History of sore throat    Personal history of other diseases of urinary system 07/26/2018    History of hematuria    Personal history of other endocrine, nutritional and metabolic disease     History of hypothyroidism    Personal history of other specified conditions 02/21/2019    History of dysuria    Personal history of urinary (tract) infections 02/21/2019    History of urinary tract infection    Postmenopausal atrophic vaginitis 06/14/2021    Vaginal atrophy    Urge incontinence 06/11/2019    Urge incontinence     Past Surgical History:   Procedure Laterality Date    CATARACT EXTRACTION  12/16/2016    Cataract Surgery    HAND TENDON SURGERY  12/16/2016    Hand Incision Tendon Sheath Of A Finger    HYSTERECTOMY      OTHER SURGICAL HISTORY  12/16/2016    Prophylaxis Of Retinal Detachment    OTHER SURGICAL HISTORY  12/16/2016    Condylotomy Of TMJ    OTHER SURGICAL HISTORY  12/16/2016    Cervical Surgery (Gyn)    OTHER SURGICAL HISTORY  09/27/2021    Hysterectomy    TOTAL KNEE ARTHROPLASTY  12/16/2016    Total Knee Arthroplasty     Family History   Problem Relation Name Age of Onset    Other (hamman  rich disease) Mother      Pancreatic cancer Father      Hyperlipidemia Father      Hypertension Father      Diabetes Other paternal aunt     Other (neoplasm of breast) Cousin       Social History     Tobacco Use    Smoking status: Never    Smokeless tobacco: Never   Vaping Use    Vaping status: Never Used   Substance Use Topics    Alcohol use: Never    Drug use: Never       Physical Exam   ED Triage Vitals [05/04/24 0948]    Temperature Heart Rate Respirations BP   36.3 °C (97.4 °F) 79 17 167/68      Pulse Ox Temp src Heart Rate Source Patient Position   99 % -- -- --      BP Location FiO2 (%)     -- --       Physical Exam  Constitutional:       Appearance: Normal appearance. She is normal weight.   HENT:      Head: Normocephalic and atraumatic.      Nose: Nose normal.      Mouth/Throat:      Mouth: Mucous membranes are moist.   Eyes:      Pupils: Pupils are equal, round, and reactive to light.   Cardiovascular:      Rate and Rhythm: Normal rate and regular rhythm.   Pulmonary:      Effort: Pulmonary effort is normal.      Breath sounds: Normal breath sounds.   Abdominal:      General: Abdomen is flat. Bowel sounds are normal. There is no distension.      Palpations: Abdomen is soft.      Tenderness: There is no abdominal tenderness. There is right CVA tenderness and left CVA tenderness. There is no guarding or rebound.   Musculoskeletal:         General: Normal range of motion.      Cervical back: Normal range of motion.   Skin:     General: Skin is warm and dry.   Neurological:      Mental Status: She is alert and oriented to person, place, and time. Mental status is at baseline.   Psychiatric:         Mood and Affect: Mood normal.         Behavior: Behavior normal.       ED Course & MDM   Diagnoses as of 05/04/24 1403   Acute diverticulitis   Bilateral nephrolithiasis        Medical Decision Making  Patient presented to the ED with complaints of bilateral flank pain x10 days associated with increased urinary urgency, frequency, and intermittent nausea. Vitals stable on arrival, afebrile. Physical exam was significant for bilateral CVA tenderness R>L. Ddx includes nephrolithiasis, cystitis, pyelonephritis, diverticulitis, muscle strain were considered. Labs (CBC, CMP, UA) and CT abd/pelvis w/o were ordered. CBC, CMP, UA overall unremarkable. CT AP showing acute diverticulitis of the mid ascending colon and distal descending/proximal  sigmoid colon as well as nonobstructive subcentimeter bilateral renal calculi. Patient overall well appearing. Plan to treat for acute diverticulitis as outpatient with 10 day course of augmentin. Encourage symptom management with fluids, stool softener such as miralax, and pain control with tylenol. Case was reviewed with the attending physician, who agrees with the plan. Patient and/or patient's representatives were counseled regarding labs, imaging, likely diagnosis, and plan. All questions were answered.     Impression: acute diverticulitis, non-obstructive bilateral nephrolithiasis.     Disposition: home     Procedure  Procedures     Jared Carranza MD  Resident  05/04/24 1401         Jared Carranza MD  Resident  05/04/24 1404

## 2024-05-04 NOTE — DISCHARGE INSTRUCTIONS
You presented to the ED with bilateral flank pain. CT imaging showed nonobstructive subcentimeter bilateral kidney stones and acute diverticulitis of your ascending and descending colon.   - please take Augmentin every 8 hours for 14 days   - encourage fluids and stool softener such as miralax  - recommend as needed pain control with tylenol 975 mg up to 3 times a day  - follow up with your primary care physician regarding need for repeat colonoscopy in 6-8 weeks.

## 2024-05-04 NOTE — ED TRIAGE NOTES
Bilat flank pain x 10 days. Pt endorse frequently urination. Pt denies hematuria or burning at this time. Pt also endorse n/v x 2 days.

## 2024-05-05 LAB — BACTERIA UR CULT: NORMAL

## 2024-05-06 ENCOUNTER — PHARMACY VISIT (OUTPATIENT)
Dept: PHARMACY | Facility: CLINIC | Age: 74
End: 2024-05-06
Payer: COMMERCIAL

## 2024-05-06 DIAGNOSIS — E87.6 HYPOKALEMIA: Primary | ICD-10-CM

## 2024-05-06 LAB — BACTERIA UR CULT: NORMAL

## 2024-05-06 PROCEDURE — RXMED WILLOW AMBULATORY MEDICATION CHARGE

## 2024-05-06 RX ORDER — POTASSIUM CHLORIDE 20 MEQ/1
20 TABLET, EXTENDED RELEASE ORAL DAILY
Qty: 90 TABLET | Refills: 0 | Status: SHIPPED | OUTPATIENT
Start: 2024-05-06

## 2024-05-16 PROCEDURE — RXMED WILLOW AMBULATORY MEDICATION CHARGE

## 2024-05-20 ENCOUNTER — OFFICE VISIT (OUTPATIENT)
Dept: PRIMARY CARE | Facility: CLINIC | Age: 74
End: 2024-05-20
Payer: COMMERCIAL

## 2024-05-20 ENCOUNTER — LAB (OUTPATIENT)
Dept: LAB | Facility: LAB | Age: 74
End: 2024-05-20
Payer: COMMERCIAL

## 2024-05-20 VITALS
DIASTOLIC BLOOD PRESSURE: 80 MMHG | WEIGHT: 149 LBS | HEART RATE: 66 BPM | OXYGEN SATURATION: 98 % | SYSTOLIC BLOOD PRESSURE: 126 MMHG | BODY MASS INDEX: 24.83 KG/M2 | HEIGHT: 65 IN

## 2024-05-20 DIAGNOSIS — I10 PRIMARY HYPERTENSION: ICD-10-CM

## 2024-05-20 DIAGNOSIS — K57.92 DIVERTICULITIS: Primary | ICD-10-CM

## 2024-05-20 PROBLEM — E55.9 HYPOVITAMINOSIS D: Status: RESOLVED | Noted: 2023-02-10 | Resolved: 2024-05-20

## 2024-05-20 LAB
ALBUMIN SERPL BCP-MCNC: 3.9 G/DL (ref 3.4–5)
ANION GAP SERPL CALC-SCNC: 14 MMOL/L (ref 10–20)
BUN SERPL-MCNC: 15 MG/DL (ref 6–23)
CALCIUM SERPL-MCNC: 9.7 MG/DL (ref 8.6–10.6)
CHLORIDE SERPL-SCNC: 102 MMOL/L (ref 98–107)
CO2 SERPL-SCNC: 28 MMOL/L (ref 21–32)
CREAT SERPL-MCNC: 0.73 MG/DL (ref 0.5–1.05)
EGFRCR SERPLBLD CKD-EPI 2021: 87 ML/MIN/1.73M*2
GLUCOSE SERPL-MCNC: 92 MG/DL (ref 74–99)
PHOSPHATE SERPL-MCNC: 3.8 MG/DL (ref 2.5–4.9)
POTASSIUM SERPL-SCNC: 3.7 MMOL/L (ref 3.5–5.3)
SODIUM SERPL-SCNC: 140 MMOL/L (ref 136–145)

## 2024-05-20 PROCEDURE — 1159F MED LIST DOCD IN RCRD: CPT | Performed by: FAMILY MEDICINE

## 2024-05-20 PROCEDURE — 36415 COLL VENOUS BLD VENIPUNCTURE: CPT

## 2024-05-20 PROCEDURE — 3079F DIAST BP 80-89 MM HG: CPT | Performed by: FAMILY MEDICINE

## 2024-05-20 PROCEDURE — 99213 OFFICE O/P EST LOW 20 MIN: CPT | Performed by: FAMILY MEDICINE

## 2024-05-20 PROCEDURE — 1160F RVW MEDS BY RX/DR IN RCRD: CPT | Performed by: FAMILY MEDICINE

## 2024-05-20 PROCEDURE — 80069 RENAL FUNCTION PANEL: CPT

## 2024-05-20 PROCEDURE — 1036F TOBACCO NON-USER: CPT | Performed by: FAMILY MEDICINE

## 2024-05-20 PROCEDURE — 3074F SYST BP LT 130 MM HG: CPT | Performed by: FAMILY MEDICINE

## 2024-05-20 NOTE — PROGRESS NOTES
"Subjective   Patient ID: Amanda Matson is a 73 y.o. female who presents for Diverticulitis.    HPI   The patient reports that her symptoms started with flank pain and was diagnosed with diverticulitis. She states that she has completed the course of Augmentin and adds that her symptoms has improved significantly.     Review of Systems  Constitutional: No fever or chills  Cardiovascular: no chest pain, no palpitations and no syncope.   Respiratory: no cough, no shortness of breath during exertion and no shortness of breath at rest.   Gastrointestinal: no abdominal pain, no nausea and no vomiting.  Neuro: No Headache, no dizziness    Objective   /80   Pulse 66   Ht 1.651 m (5' 5\")   Wt 67.6 kg (149 lb)   SpO2 98%   BMI 24.79 kg/m²     Physical Exam  Constitutional: Alert and in no acute distress. Well developed, well nourished  Head and Face: Head and face: Normal.    Cardiovascular: Heart rate and rhythm were normal, normal S1 and S2. No peripheral edema.   Pulmonary: No respiratory distress. Clear bilateral breath sounds.  Musculoskeletal: Gait and station: Normal. Muscle strength/tone: Normal.   Skin: Normal skin color and pigmentation, normal skin turgor, and no rash.    Psychiatric: Judgment and insight: Intact. Mood and affect: Normal.    Lab Results   Component Value Date    WBC 6.9 05/04/2024    HGB 12.0 05/04/2024    HCT 34.6 (L) 05/04/2024     05/04/2024    CHOL 169 12/08/2023    TRIG 126 12/08/2023    HDL 60.9 12/08/2023    ALT 13 05/04/2024    AST 23 05/04/2024     05/04/2024    K 3.2 (L) 05/04/2024     05/04/2024    CREATININE 0.70 05/04/2024    BUN 12 05/04/2024    CO2 25 05/04/2024    TSH 0.50 06/05/2023    INR 1.0 05/16/2018       CT abdomen pelvis wo IV contrast  Narrative: Interpreted By:  Fortino Begum,   STUDY:  CT ABDOMEN PELVIS WO IV CONTRAST;  5/4/2024 12:54 pm      INDICATION:  Signs/Symptoms:bilateral flank pain, renal US with bilateral renal  calculi.    "   COMPARISON:  None.      ACCESSION NUMBER(S):  [id number]      ORDERING CLINICIAN:  CASANDRA BUSTILLOS      TECHNIQUE:  Contiguous axial images of the abdomen and pelvis were obtained  without intravenous contrast. Coronal and sagittal reformatted images  were reconstructed from the axial data.      FINDINGS:  LOWER CHEST: No acute abnormality.      Note: The absence of intravenous contrast limits evaluation of the  solid organs and vasculature.      ABDOMEN/PELVIS:      ABDOMINAL WALL: No significant abnormality.      LIVER: The liver demonstrates a normal noncontrast attenuation. There  is a 0.7 cm low-density lesion in segment for a likely representing a  simple cyst.      BILE DUCTS: No significant intrahepatic or extrahepatic dilatation.      GALLBLADDER: No significant abnormality.      PANCREAS: No significant abnormality.      SPLEEN: No significant abnormality.      ADRENALS: No significant abnormality.      KIDNEYS, URETERS, BLADDER: There are left renal calculi (x2)  measuring 0.4 cm in the mid pole and 0.2 cm in lower pole. There is a  0.1 cm nonobstructing right lower pole renal calculus. There is no  hydronephrosis on the right or left. Trache that that      REPRODUCTIVE ORGANS: No significant abnormality.      VESSELS: Mild aortic atherosclerosis without AAA.      RETROPERITONEUM/LYMPH NODES: No enlarged lymph nodes. No acute  retroperitoneal abnormality.      BOWEL/MESENTERY/PERITONEUM: There is diffuse colonic diverticulosis.  There is inflammatory fat stranding adjacent to a diverticulum in the  distal descending/proximal sigmoid colon (axial image 87, series  201). There is also acute inflammatory changes surrounding a  diverticulum of the mid ascending colon (coronal image 46, series 2  of 3). Both findings are consistent with acute diverticulitis. The  small bowel is normal in caliber without evidence of inflammatory  change. Normal appendix.      No ascites, free air, or fluid collection.           MUSCULOSKELETAL: No acute osseous abnormality. No suspicious osseous  lesion.      Impression: 1. Acute diverticulitis in 2 locations, mid ascending colon and  distal descending/proximal sigmoid colon.      2. Nonobstructing subcentimeter bilateral renal calculi measuring 0.4  cm and 0.2 cm on the left 10.1 cm on the right.                  MACRO:  None.      Signed by: Fortino Begum 5/4/2024 1:01 PM  Dictation workstation:   [id number]      Assessment/Plan   Diagnoses and all orders for this visit:  Diverticulitis  Primary hypertension  -     Renal Function Panel; Future        Dear Amanda Matson     It was my pleasure to take care of you today in the office. Below are the things we discussed today:    1. Diverticulitis: Steadily improving, the patient completed the course of Augmentin. Advised her to start a clear liquid diet if she starts to experience this pain and if it does not resolve please let me know. If her symptoms start to improve, recommended that she slowly introduce foods by following a BRAT diet.    Your yearly Physical is due in: July 2024   When you call the office for your yearly Physical, please ask them to inform me to order your blood work, so that you can get the fasting blood work before your appointment and we can discuss the results at your physical.      Please call me if any questions arise from now until your next visit. I will call you after I am done seeing patients. A Doctor is always available by phone when the office is closed. Please feel free to call for help with any problem that you feel shouldn't wait until the office re-opens.     Scribe Attestation  By signing my name below, I, Alexus Meade   attest that this documentation has been prepared under the direction and in the presence of Kathryn Dunham MD.

## 2024-05-22 ENCOUNTER — PHARMACY VISIT (OUTPATIENT)
Dept: PHARMACY | Facility: CLINIC | Age: 74
End: 2024-05-22
Payer: COMMERCIAL

## 2024-06-05 DIAGNOSIS — G25.81 RLS (RESTLESS LEGS SYNDROME): ICD-10-CM

## 2024-06-05 PROCEDURE — RXMED WILLOW AMBULATORY MEDICATION CHARGE

## 2024-06-06 ENCOUNTER — PHARMACY VISIT (OUTPATIENT)
Dept: PHARMACY | Facility: CLINIC | Age: 74
End: 2024-06-06
Payer: COMMERCIAL

## 2024-06-06 RX ORDER — ROPINIROLE 3 MG/1
3 TABLET, FILM COATED ORAL NIGHTLY
Qty: 90 TABLET | Refills: 0 | Status: SHIPPED | OUTPATIENT
Start: 2024-06-06

## 2024-06-13 DIAGNOSIS — E78.2 MIXED HYPERLIPIDEMIA: ICD-10-CM

## 2024-06-14 ENCOUNTER — PHARMACY VISIT (OUTPATIENT)
Dept: PHARMACY | Facility: CLINIC | Age: 74
End: 2024-06-14
Payer: COMMERCIAL

## 2024-06-14 PROCEDURE — RXMED WILLOW AMBULATORY MEDICATION CHARGE

## 2024-06-14 RX ORDER — FENOFIBRIC ACID 45 MG/1
45 CAPSULE, DELAYED RELEASE ORAL DAILY
Qty: 90 CAPSULE | Refills: 0 | Status: SHIPPED | OUTPATIENT
Start: 2024-06-14

## 2024-07-17 ENCOUNTER — APPOINTMENT (OUTPATIENT)
Dept: PRIMARY CARE | Facility: CLINIC | Age: 74
End: 2024-07-17
Payer: COMMERCIAL

## 2024-07-18 NOTE — PROGRESS NOTES
"Subjective   Patient ID: Amanda Matson is a 74 y.o. female who presents for Annual Exam.    Last Annual Physical: July 2023   Last Dental Visit: Up-to-date   Last Eye exam: Up-to-date   Hearing Concerns: No   Diet: Well- balanced   Exercise Routine: Regular exercise      HPI   The patient reports that she is taking rosuvastatin and fenofibrate for hyperlipidemia, levothyroxine for hypothyroidism. Trazodone for insomnia, chlorthalidone for hypertension and trospium for stress incontinence. She is taking these medications a prescribed and denies having side effects from them. Aman depression is stable without medications at this time.     Review of Systems  Constitutional: No fever or chills, No Night Sweats  Eyes: No Blurry Vision or Eye sight problems  ENT: No Nasal Discharge, Hoarseness, sore throat  Cardiovascular: no chest pain, no palpitations and no syncope.   Respiratory: no cough, no shortness of breath during exertion and no shortness of breath at rest.   Gastrointestinal: no abdominal pain, no nausea and no vomiting.   : No vaginal discharge, burning with urination, no blood in urine or stools  Skin: No Skin rashes or Lesions  Neuro: No Headache, no dizziness or Numbness or tingling  Psych: No Anxiety, depression or sleeping problems  Heme: No Easy bleeding or brusing.     Objective   /87 (BP Location: Left arm, Patient Position: Sitting, BP Cuff Size: Adult)   Pulse 67   Ht 1.651 m (5' 5\")   Wt 69.4 kg (153 lb)   SpO2 97%   BMI 25.46 kg/m²     Physical Exam  Constitutional: Alert and in no acute distress. Well developed, well nourished.   Head and Face: Head and face: Normal.    Eyes: Normal external exam. Pupils were equal in size, round, reactive to light (PERRL) with normal accommodation and extraocular movements intact (EOMI).   Ears, Nose, Mouth, and Throat: External inspection of ears and nose: Normal.  Hearing: Normal.  Nasal mucosa, septum, and turbinates: Normal.  Lips, teeth, and " gums: Normal.  Oropharynx: Normal.   Neck: No neck mass was observed. Supple. Thyroid not enlarged and there were no palpable thyroid nodules.   Cardiovascular: Heart rate and rhythm were normal, normal S1 and S2. Pedal pulses: Normal. No peripheral edema.   Pulmonary: No respiratory distress. Clear bilateral breath sounds.   Abdomen: Soft nontender; no abdominal mass palpated. Normal bowel sounds. No organomegaly.   Musculoskeletal: No joint swelling seen, normal movements of all extremities. Range of motion: Normal.  Muscle strength/tone: Normal.    Skin: Normal skin color and pigmentation, normal skin turgor, and no rash.   Neurologic: Deep tendon reflexes were 2+ and symmetric.   Psychiatric: Judgment and insight: Intact. Mood and affect: Normal.  Lymphatic: No cervical lymphadenopathy. Palpation of lymph nodes in axillae: Normal.  Palpation of lymph nodes in groin: Normal.    Lab Results   Component Value Date    WBC 6.9 05/04/2024    HGB 12.0 05/04/2024    HCT 34.6 (L) 05/04/2024     05/04/2024    CHOL 169 12/08/2023    TRIG 126 12/08/2023    HDL 60.9 12/08/2023    ALT 13 05/04/2024    AST 23 05/04/2024     05/20/2024    K 3.7 05/20/2024     05/20/2024    CREATININE 0.73 05/20/2024    BUN 15 05/20/2024    CO2 28 05/20/2024    TSH 0.50 06/05/2023    INR 1.0 05/16/2018       CT abdomen pelvis wo IV contrast  Narrative: Interpreted By:  Fortino Begum,   STUDY:  CT ABDOMEN PELVIS WO IV CONTRAST;  5/4/2024 12:54 pm      INDICATION:  Signs/Symptoms:bilateral flank pain, renal US with bilateral renal  calculi.      COMPARISON:  None.      ACCESSION NUMBER(S):  [id number]      ORDERING CLINICIAN:  CASANDRA BUSTILLOS      TECHNIQUE:  Contiguous axial images of the abdomen and pelvis were obtained  without intravenous contrast. Coronal and sagittal reformatted images  were reconstructed from the axial data.      FINDINGS:  LOWER CHEST: No acute abnormality.      Note: The absence of intravenous  contrast limits evaluation of the  solid organs and vasculature.      ABDOMEN/PELVIS:      ABDOMINAL WALL: No significant abnormality.      LIVER: The liver demonstrates a normal noncontrast attenuation. There  is a 0.7 cm low-density lesion in segment for a likely representing a  simple cyst.      BILE DUCTS: No significant intrahepatic or extrahepatic dilatation.      GALLBLADDER: No significant abnormality.      PANCREAS: No significant abnormality.      SPLEEN: No significant abnormality.      ADRENALS: No significant abnormality.      KIDNEYS, URETERS, BLADDER: There are left renal calculi (x2)  measuring 0.4 cm in the mid pole and 0.2 cm in lower pole. There is a  0.1 cm nonobstructing right lower pole renal calculus. There is no  hydronephrosis on the right or left. Trache that that      REPRODUCTIVE ORGANS: No significant abnormality.      VESSELS: Mild aortic atherosclerosis without AAA.      RETROPERITONEUM/LYMPH NODES: No enlarged lymph nodes. No acute  retroperitoneal abnormality.      BOWEL/MESENTERY/PERITONEUM: There is diffuse colonic diverticulosis.  There is inflammatory fat stranding adjacent to a diverticulum in the  distal descending/proximal sigmoid colon (axial image 87, series  201). There is also acute inflammatory changes surrounding a  diverticulum of the mid ascending colon (coronal image 46, series 2  of 3). Both findings are consistent with acute diverticulitis. The  small bowel is normal in caliber without evidence of inflammatory  change. Normal appendix.      No ascites, free air, or fluid collection.          MUSCULOSKELETAL: No acute osseous abnormality. No suspicious osseous  lesion.      Impression: 1. Acute diverticulitis in 2 locations, mid ascending colon and  distal descending/proximal sigmoid colon.      2. Nonobstructing subcentimeter bilateral renal calculi measuring 0.4  cm and 0.2 cm on the left 10.1 cm on the right.                  MACRO:  None.      Signed by: Fortino  Kel 5/4/2024 1:01 PM  Dictation workstation:   [id number]      Assessment/Plan   Diagnoses and all orders for this visit:  Annual physical exam  -     CBC; Future  -     Comprehensive Metabolic Panel; Future  -     Hemoglobin A1C; Future  -     Lipid Panel; Future  -     TSH with reflex to Free T4 if abnormal; Future  -     Vitamin B12; Future  Primary hypertension  -     Follow Up In [facility] - PCP - Health Maintenance  -     chlorthalidone (Hygroton) 50 mg tablet; Take 1 tablet (50 mg) by mouth once daily.  -     Albumin-Creatinine Ratio, Urine Random; Future  -     Follow Up In [facility] - PCP - Established; Future  Mixed hyperlipidemia  -     choline fenofibrate (Trilipix) 45 mg DR capsule; Take 1 capsule (45 mg) by mouth once daily.  -     rosuvastatin (Crestor) 10 mg tablet; Take 1 tablet (10 mg) by mouth once daily.  Acquired hypothyroidism  -     levothyroxine (Synthroid, Levoxyl) 75 mcg tablet; Take 1 tablet (75 mcg) by mouth once daily in the morning.  Urge and stress incontinence  Moderate single current episode of major depressive disorder (Multi)  Chronic insomnia  -     traZODone (Desyrel) 150 mg tablet; Take 1 tablet (150 mg) by mouth once daily at bedtime.  Hypokalemia  -     potassium chloride CR (Klor-Con M20) 20 mEq ER tablet; Take 1 tablet (20 mEq) by mouth once daily. Do not crush or chew.  RLS (restless legs syndrome)  -     rOPINIRole (Requip) 3 mg tablet; Take 1 tablet (3 mg) by mouth 2 times a day.  Overactive bladder  -     trospium (Sanctura) 20 mg tablet; Take 1/2 to 1 tablet by mouth twice a day as needed for overactive bladder  Visit for screening mammogram  -     BI mammo bilateral screening tomosynthesis; Future  Vitamin D deficiency  -     Vitamin D 25-Hydroxy,Total (for eval of Vitamin D levels); Future  Gait instability  -     Referral to Physical Therapy; Future        Dear Amanda Matson     It was my pleasure to take care of you today in the  office. Below are the things we discussed today:    1. 1. Immunizations: Yearly Flu shot is recommended. Up-to-date         a: COVID: Please get booster from the pharmacy in fall          b: Tetanus: Up-to-date. Done in 2023          c: Shingrix: Up-to-date         d: Pneumovax: Up-to-date         e: Prevnar: Up-to-date         F. RSV: Please get from the pharmacy in fall     2. Blood Work: Ordered  3. Seen your dentist twice a year  4. Yearly Eye exam is recommended    5. BMI: Overweight   6: Diet recommendations:   Eat Clean, Try to have as many home cooked meals as possible  Avoid processed foods which contain excess calories, sugar, and sodium.    7. Exercise recommendations:   150 minutes a week to maintain your weight     If you have to loose weight, you need a better diet and exercise plan.     8. Supplements recommended:  a - Calcium 600 mg up to twice a day to get a total of 1200 mg. Each 8 oz of milk or yogurt or 1 oz of cheese, 1 Banana, 1 serving of green Leafy vegetable has about 300 mg of Calcium, so you may subtract that amount. Calcium citrate is the only acceptable supplement to take if you take an acid suppressing medication like Prilosec; otherwise Calcium carbonate is acceptable too (It can cause Constipation).   b - Vitamin D - 2000 IU daily     9. Please get your Living will / Advance directive completed if you do not have one already. Please make sure our office has a copy of the latest one.     10. Colonoscopy: Uptodate. Last done in Oct 2021, repeat in Oct 2026   11. Mammogram: Uptodate. Ordered for Jan 2025   12. PAP: Not indicated   13. DEXA: Up-to-date   14: Skin Check: Please see Dermatology once a year for a Skin Check.     15. Hyperlipidemia: Continue rosuvastatin and fenofibrate.      16. Hypertension: Continue chlorthalidone.     17. RLS: Continue Requip.      18. Hypothyroidism: Continue levothyroxine.     19. Insomnia: Continue Trazodone.     20. Stress incontinence: Continue  Tropsium.     21. Depression: Stable without medications.    22. Gait instability: Physical therapy referral.     Follow up in 6 months.    Follow up in one year for a Physical. Please call the office before your Physical to see if you need blood work completed prior to your physical.     Please call me if any questions arise from now until your next visit. I will call you after I am done seeing patients. A Doctor is always available by phone when the office is closed. Please feel free to call for help with any problem that you feel shouldn't wait until the office re-opens.     Scribe Attestation  By signing my name below, IMiranda Scribe   attest that this documentation has been prepared under the direction and in the presence of Kathryn Dunham MD.

## 2024-07-22 ENCOUNTER — APPOINTMENT (OUTPATIENT)
Dept: PRIMARY CARE | Facility: CLINIC | Age: 74
End: 2024-07-22
Payer: COMMERCIAL

## 2024-07-22 VITALS
HEART RATE: 67 BPM | BODY MASS INDEX: 25.49 KG/M2 | HEIGHT: 65 IN | SYSTOLIC BLOOD PRESSURE: 139 MMHG | OXYGEN SATURATION: 97 % | DIASTOLIC BLOOD PRESSURE: 78 MMHG | WEIGHT: 153 LBS

## 2024-07-22 DIAGNOSIS — N32.81 OVERACTIVE BLADDER: ICD-10-CM

## 2024-07-22 DIAGNOSIS — Z12.31 VISIT FOR SCREENING MAMMOGRAM: ICD-10-CM

## 2024-07-22 DIAGNOSIS — R26.81 GAIT INSTABILITY: ICD-10-CM

## 2024-07-22 DIAGNOSIS — E87.6 HYPOKALEMIA: ICD-10-CM

## 2024-07-22 DIAGNOSIS — G25.81 RLS (RESTLESS LEGS SYNDROME): ICD-10-CM

## 2024-07-22 DIAGNOSIS — F51.04 CHRONIC INSOMNIA: ICD-10-CM

## 2024-07-22 DIAGNOSIS — F32.1 MODERATE SINGLE CURRENT EPISODE OF MAJOR DEPRESSIVE DISORDER (MULTI): ICD-10-CM

## 2024-07-22 DIAGNOSIS — E03.9 ACQUIRED HYPOTHYROIDISM: ICD-10-CM

## 2024-07-22 DIAGNOSIS — Z00.00 ANNUAL PHYSICAL EXAM: Primary | ICD-10-CM

## 2024-07-22 DIAGNOSIS — E55.9 VITAMIN D DEFICIENCY: ICD-10-CM

## 2024-07-22 DIAGNOSIS — I10 PRIMARY HYPERTENSION: ICD-10-CM

## 2024-07-22 DIAGNOSIS — N39.46 URGE AND STRESS INCONTINENCE: ICD-10-CM

## 2024-07-22 DIAGNOSIS — E78.2 MIXED HYPERLIPIDEMIA: ICD-10-CM

## 2024-07-22 PROBLEM — N39.3 STRESS INCONTINENCE IN FEMALE: Status: RESOLVED | Noted: 2023-02-10 | Resolved: 2024-07-22

## 2024-07-22 PROBLEM — M25.562 LEFT KNEE PAIN: Status: RESOLVED | Noted: 2023-02-10 | Resolved: 2024-07-22

## 2024-07-22 PROBLEM — M79.672 LEFT FOOT PAIN: Status: RESOLVED | Noted: 2023-02-10 | Resolved: 2024-07-22

## 2024-07-22 PROBLEM — N90.89 VULVAR IRRITATION: Status: RESOLVED | Noted: 2023-02-10 | Resolved: 2024-07-22

## 2024-07-22 PROBLEM — M21.40 FLAT FOOT: Status: RESOLVED | Noted: 2023-02-10 | Resolved: 2024-07-22

## 2024-07-22 PROCEDURE — 1160F RVW MEDS BY RX/DR IN RCRD: CPT | Performed by: FAMILY MEDICINE

## 2024-07-22 PROCEDURE — 99397 PER PM REEVAL EST PAT 65+ YR: CPT | Performed by: FAMILY MEDICINE

## 2024-07-22 PROCEDURE — 1159F MED LIST DOCD IN RCRD: CPT | Performed by: FAMILY MEDICINE

## 2024-07-22 PROCEDURE — RXMED WILLOW AMBULATORY MEDICATION CHARGE

## 2024-07-22 PROCEDURE — 1036F TOBACCO NON-USER: CPT | Performed by: FAMILY MEDICINE

## 2024-07-22 PROCEDURE — 3008F BODY MASS INDEX DOCD: CPT | Performed by: FAMILY MEDICINE

## 2024-07-22 PROCEDURE — 1123F ACP DISCUSS/DSCN MKR DOCD: CPT | Performed by: FAMILY MEDICINE

## 2024-07-22 PROCEDURE — 3078F DIAST BP <80 MM HG: CPT | Performed by: FAMILY MEDICINE

## 2024-07-22 PROCEDURE — 3075F SYST BP GE 130 - 139MM HG: CPT | Performed by: FAMILY MEDICINE

## 2024-07-22 RX ORDER — ROSUVASTATIN CALCIUM 10 MG/1
10 TABLET, COATED ORAL DAILY
Qty: 90 TABLET | Refills: 1 | Status: SHIPPED | OUTPATIENT
Start: 2024-07-22

## 2024-07-22 RX ORDER — CHLORTHALIDONE 50 MG/1
50 TABLET ORAL DAILY
Qty: 90 TABLET | Refills: 1 | Status: SHIPPED | OUTPATIENT
Start: 2024-07-22

## 2024-07-22 RX ORDER — LEVOTHYROXINE SODIUM 75 UG/1
75 TABLET ORAL EVERY MORNING
Qty: 90 TABLET | Refills: 1 | Status: SHIPPED | OUTPATIENT
Start: 2024-07-22

## 2024-07-22 RX ORDER — TROSPIUM CHLORIDE 20 MG/1
20 TABLET, FILM COATED ORAL 2 TIMES DAILY
Qty: 180 TABLET | Refills: 1 | Status: SHIPPED | OUTPATIENT
Start: 2024-07-22

## 2024-07-22 RX ORDER — POTASSIUM CHLORIDE 20 MEQ/1
20 TABLET, EXTENDED RELEASE ORAL DAILY
Qty: 90 TABLET | Refills: 1 | Status: SHIPPED | OUTPATIENT
Start: 2024-07-22

## 2024-07-22 RX ORDER — TRAZODONE HYDROCHLORIDE 150 MG/1
150 TABLET ORAL NIGHTLY
Qty: 90 TABLET | Refills: 1 | Status: SHIPPED | OUTPATIENT
Start: 2024-07-22

## 2024-07-22 RX ORDER — ROPINIROLE 3 MG/1
3 TABLET, FILM COATED ORAL 2 TIMES DAILY
Qty: 180 TABLET | Refills: 1 | Status: SHIPPED | OUTPATIENT
Start: 2024-07-22

## 2024-07-22 RX ORDER — FENOFIBRIC ACID 45 MG/1
45 CAPSULE, DELAYED RELEASE ORAL DAILY
Qty: 90 CAPSULE | Refills: 1 | Status: SHIPPED | OUTPATIENT
Start: 2024-07-22

## 2024-07-22 ASSESSMENT — PROMIS GLOBAL HEALTH SCALE
RATE_GENERAL_HEALTH: GOOD
RATE_MENTAL_HEALTH: VERY GOOD
CARRYOUT_SOCIAL_ACTIVITIES: EXCELLENT
RATE_QUALITY_OF_LIFE: GOOD
RATE_AVERAGE_PAIN: 2
EMOTIONAL_PROBLEMS: SOMETIMES
CARRYOUT_PHYSICAL_ACTIVITIES: COMPLETELY
RATE_SOCIAL_SATISFACTION: VERY GOOD
RATE_PHYSICAL_HEALTH: GOOD

## 2024-07-22 ASSESSMENT — PATIENT HEALTH QUESTIONNAIRE - PHQ9
2. FEELING DOWN, DEPRESSED OR HOPELESS: SEVERAL DAYS
1. LITTLE INTEREST OR PLEASURE IN DOING THINGS: NOT AT ALL
SUM OF ALL RESPONSES TO PHQ9 QUESTIONS 1 AND 2: 1

## 2024-07-22 ASSESSMENT — COLUMBIA-SUICIDE SEVERITY RATING SCALE - C-SSRS
1. IN THE PAST MONTH, HAVE YOU WISHED YOU WERE DEAD OR WISHED YOU COULD GO TO SLEEP AND NOT WAKE UP?: NO
2. HAVE YOU ACTUALLY HAD ANY THOUGHTS OF KILLING YOURSELF?: NO

## 2024-07-24 ENCOUNTER — PHARMACY VISIT (OUTPATIENT)
Dept: PHARMACY | Facility: CLINIC | Age: 74
End: 2024-07-24
Payer: COMMERCIAL

## 2024-07-24 PROCEDURE — RXMED WILLOW AMBULATORY MEDICATION CHARGE

## 2024-07-24 RX ORDER — AMOXICILLIN 500 MG/1
2000 CAPSULE ORAL
Qty: 12 CAPSULE | Refills: 1 | OUTPATIENT
Start: 2024-07-24

## 2024-07-25 ENCOUNTER — APPOINTMENT (OUTPATIENT)
Dept: LAB | Facility: LAB | Age: 74
End: 2024-07-25
Payer: COMMERCIAL

## 2024-07-27 ENCOUNTER — LAB (OUTPATIENT)
Dept: LAB | Facility: LAB | Age: 74
End: 2024-07-27
Payer: COMMERCIAL

## 2024-07-27 DIAGNOSIS — E03.9 ACQUIRED HYPOTHYROIDISM: ICD-10-CM

## 2024-07-27 DIAGNOSIS — I10 PRIMARY HYPERTENSION: ICD-10-CM

## 2024-07-27 DIAGNOSIS — E78.2 MIXED HYPERLIPIDEMIA: ICD-10-CM

## 2024-07-27 DIAGNOSIS — Z00.00 ANNUAL PHYSICAL EXAM: ICD-10-CM

## 2024-07-27 DIAGNOSIS — R73.9 ELEVATED BLOOD SUGAR: ICD-10-CM

## 2024-07-27 DIAGNOSIS — E55.9 VITAMIN D DEFICIENCY: ICD-10-CM

## 2024-07-27 LAB
25(OH)D3 SERPL-MCNC: 48 NG/ML (ref 30–100)
ALBUMIN SERPL BCP-MCNC: 4 G/DL (ref 3.4–5)
ALP SERPL-CCNC: 58 U/L (ref 33–136)
ALT SERPL W P-5'-P-CCNC: 20 U/L (ref 7–45)
ANION GAP SERPL CALC-SCNC: 13 MMOL/L (ref 10–20)
AST SERPL W P-5'-P-CCNC: 27 U/L (ref 9–39)
BILIRUB SERPL-MCNC: 0.7 MG/DL (ref 0–1.2)
BUN SERPL-MCNC: 20 MG/DL (ref 6–23)
CALCIUM SERPL-MCNC: 9.8 MG/DL (ref 8.6–10.6)
CHLORIDE SERPL-SCNC: 103 MMOL/L (ref 98–107)
CHOLEST SERPL-MCNC: 185 MG/DL (ref 0–199)
CHOLESTEROL/HDL RATIO: 3.1
CO2 SERPL-SCNC: 28 MMOL/L (ref 21–32)
CREAT SERPL-MCNC: 0.7 MG/DL (ref 0.5–1.05)
CREAT UR-MCNC: 54.6 MG/DL (ref 20–320)
EGFRCR SERPLBLD CKD-EPI 2021: >90 ML/MIN/1.73M*2
ERYTHROCYTE [DISTWIDTH] IN BLOOD BY AUTOMATED COUNT: 13.4 % (ref 11.5–14.5)
EST. AVERAGE GLUCOSE BLD GHB EST-MCNC: 114 MG/DL
GLUCOSE SERPL-MCNC: 109 MG/DL (ref 74–99)
HBA1C MFR BLD: 5.6 %
HCT VFR BLD AUTO: 37.8 % (ref 36–46)
HDLC SERPL-MCNC: 59.6 MG/DL
HGB BLD-MCNC: 12 G/DL (ref 12–16)
LDLC SERPL CALC-MCNC: 98 MG/DL
MCH RBC QN AUTO: 26.5 PG (ref 26–34)
MCHC RBC AUTO-ENTMCNC: 31.7 G/DL (ref 32–36)
MCV RBC AUTO: 84 FL (ref 80–100)
MICROALBUMIN UR-MCNC: <7 MG/L
MICROALBUMIN/CREAT UR: NORMAL MG/G{CREAT}
NON HDL CHOLESTEROL: 125 MG/DL (ref 0–149)
NRBC BLD-RTO: 0 /100 WBCS (ref 0–0)
PLATELET # BLD AUTO: 345 X10*3/UL (ref 150–450)
POTASSIUM SERPL-SCNC: 3.7 MMOL/L (ref 3.5–5.3)
PROT SERPL-MCNC: 6.9 G/DL (ref 6.4–8.2)
RBC # BLD AUTO: 4.52 X10*6/UL (ref 4–5.2)
SODIUM SERPL-SCNC: 140 MMOL/L (ref 136–145)
TRIGL SERPL-MCNC: 137 MG/DL (ref 0–149)
TSH SERPL-ACNC: 2.4 MIU/L (ref 0.44–3.98)
VIT B12 SERPL-MCNC: 520 PG/ML (ref 211–911)
VLDL: 27 MG/DL (ref 0–40)
WBC # BLD AUTO: 4.3 X10*3/UL (ref 4.4–11.3)

## 2024-07-27 PROCEDURE — 84443 ASSAY THYROID STIM HORMONE: CPT

## 2024-07-27 PROCEDURE — 82306 VITAMIN D 25 HYDROXY: CPT

## 2024-07-27 PROCEDURE — 80061 LIPID PANEL: CPT

## 2024-07-27 PROCEDURE — 85027 COMPLETE CBC AUTOMATED: CPT

## 2024-07-27 PROCEDURE — 80053 COMPREHEN METABOLIC PANEL: CPT

## 2024-07-27 PROCEDURE — 36415 COLL VENOUS BLD VENIPUNCTURE: CPT

## 2024-07-27 PROCEDURE — 82570 ASSAY OF URINE CREATININE: CPT

## 2024-07-27 PROCEDURE — 82607 VITAMIN B-12: CPT

## 2024-07-27 PROCEDURE — 82043 UR ALBUMIN QUANTITATIVE: CPT

## 2024-07-27 PROCEDURE — 83036 HEMOGLOBIN GLYCOSYLATED A1C: CPT

## 2024-09-03 ENCOUNTER — EVALUATION (OUTPATIENT)
Dept: PHYSICAL THERAPY | Facility: CLINIC | Age: 74
End: 2024-09-03
Payer: COMMERCIAL

## 2024-09-03 DIAGNOSIS — M21.42 PES PLANUS OF LEFT FOOT: ICD-10-CM

## 2024-09-03 DIAGNOSIS — G89.29 CHRONIC PAIN OF LEFT ANKLE: ICD-10-CM

## 2024-09-03 DIAGNOSIS — M21.079 VALGUS DEFORMITY, NOT ELSEWHERE CLASSIFIED, UNSPECIFIED ANKLE: ICD-10-CM

## 2024-09-03 DIAGNOSIS — M25.572 CHRONIC PAIN OF LEFT ANKLE: ICD-10-CM

## 2024-09-03 DIAGNOSIS — M17.12 PRIMARY OSTEOARTHRITIS OF LEFT KNEE: ICD-10-CM

## 2024-09-03 DIAGNOSIS — R26.81 GAIT INSTABILITY: Primary | ICD-10-CM

## 2024-09-03 DIAGNOSIS — M25.551 RIGHT HIP PAIN: ICD-10-CM

## 2024-09-03 PROCEDURE — RXMED WILLOW AMBULATORY MEDICATION CHARGE

## 2024-09-03 PROCEDURE — 97161 PT EVAL LOW COMPLEX 20 MIN: CPT | Mod: GP | Performed by: PHYSICAL THERAPIST

## 2024-09-03 ASSESSMENT — ACTIVITIES OF DAILY LIVING (ADL): POOR_BALANCE: 1

## 2024-09-03 NOTE — PROGRESS NOTES
"Physical Therapy Evaluation and Treatment     Patient Name: Amanda Matson  MRN: [id number]  Encounter date: 9/3/2024  Time Calculation  Start Time: 730  Stop Time: 815  Time Calculation (min): 45 min  PT Evaluation Time Entry  PT Evaluation (Low) Time Entry: 30    Visit # 1 of 10  Visits/Dates Authorized: pending auth    Current Problem:   Problem List Items Addressed This Visit             ICD-10-CM    Gait instability R26.81    Relevant Orders    Follow Up In Physical Therapy    Left ankle pain M25.572    Pes planus of left foot M21.42    Primary osteoarthritis of left knee - Primary M17.12    Right hip pain M25.551    Valgus deformity, not elsewhere classified, unspecified ankle M21.079     Precautions:          Subjective Evaluation    History of Present Illness  Mechanism of injury: \"I have problems with balance\", L eye detached retina, L collapsed arch and hammer toes.  No falls.   Off balance first thing in the am.  Unsteady in am. Better with orthotics. No dizzines.  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   Goal - manage her balance, not let it get worse.    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   She also c/o of L knee pain and is scheduled for gel injection, has hx of R TKR    Quality of life: good    Pain  Current pain ratin  At best pain ratin  At worst pain ratin  Location: L knee  Quality: dull ache    Patient Goals  Patient goals for therapy: decreased edema, increased strength, decreased pain, improved balance, independence with ADLs/IADLs and return to sport/leisure activities  Patient goal: manage her balance, prevent falls          Objective     Static Posture     Ankle/Foot   Ankle/Foot (Left): Calcaneovalgus, claw toeing, hammer toe, metatarsus abductus, pes planus and pronated.   Ankle/Foot (Right): Calcaneovalgus, claw toeing, hammer toes, metatarsus abductus, pes cavus and pronated.     Comments  L  " foot with collapsed arch, R foot with pes cavus, and calcaneovalgus, but to much less extent vs L.  R arch falls, but have end range, which does provide some stability. L foot/arch collapses, and does not offer any stability to tibia in mid stance/single leg stance.      Passive Range of Motion   Left Knee   Flexion: 115 degrees with pain  Extension: 10 degrees     Right Knee   Flexion: 110 degrees   Extension: 0 degrees     Strength/Myotome Testing     Left Hip   Planes of Motion   Flexion: 4-  Abduction: 3+    Right Hip   Planes of Motion   Flexion: 4-  Abduction: 3+    Left Knee   Flexion: 4  Extension: 4    Right Knee   Flexion: 4  Extension: 4    Ambulation     Ambulation: Level Surfaces     Additional Level Surfaces Ambulation Details  ACTIVITIES SPECIFIC BALANCE CONFIDENCE SCALE : 83% confidence score    Observational Gait   Right stance time, left swing time and right swing time within functional limits. Decreased left stance time, left step length and right step length.   Left foot contact pattern: foot flat  Right foot contact pattern: foot flat        Objective     Static Posture     Ankle/Foot   Ankle/Foot (Left): Calcaneovalgus, claw toeing, hammer toe, metatarsus abductus, pes planus and pronated.   Ankle/Foot (Right): Calcaneovalgus, claw toeing, hammer toes, metatarsus abductus, pes cavus and pronated.     Comments  L  foot with collapsed arch, R foot with pes cavus, and calcaneovalgus, but to much less extent vs L.  R arch falls, but have end range, which does provide some stability. L foot/arch collapses, and does not offer any stability to tibia in mid stance/single leg stance.      Passive Range of Motion   Left Knee   Flexion: 115 degrees with pain  Extension: 10 degrees     Right Knee   Flexion: 110 degrees   Extension: 0 degrees     Strength/Myotome Testing     Left Hip   Planes of Motion   Flexion: 4-  Abduction: 3+    Right Hip   Planes of Motion   Flexion: 4-  Abduction: 3+    Left Knee    Flexion: 4  Extension: 4    Right Knee   Flexion: 4  Extension: 4    Ambulation     Ambulation: Level Surfaces     Additional Level Surfaces Ambulation Details  ACTIVITIES SPECIFIC BALANCE CONFIDENCE SCALE : 83% confidence score    Observational Gait   Right stance time, left swing time and right swing time within functional limits. Decreased left stance time, left step length and right step length.   Left foot contact pattern: foot flat  Right foot contact pattern: foot flat       Objective     Static Posture     Ankle/Foot   Ankle/Foot (Left): Calcaneovalgus, claw toeing, hammer toe, metatarsus abductus, pes planus and pronated.   Ankle/Foot (Right): Calcaneovalgus, claw toeing, hammer toes, metatarsus abductus, pes cavus and pronated.     Comments  L  foot with collapsed arch, R foot with pes cavus, and calcaneovalgus, but to much less extent vs L.  R arch falls, but have end range, which does provide some stability. L foot/arch collapses, and does not offer any stability to tibia in mid stance/single leg stance.      Passive Range of Motion   Left Knee   Flexion: 115 degrees with pain  Extension: 10 degrees     Right Knee   Flexion: 110 degrees   Extension: 0 degrees     Strength/Myotome Testing     Left Hip   Planes of Motion   Flexion: 4-  Abduction: 3+    Right Hip   Planes of Motion   Flexion: 4-  Abduction: 3+    Left Knee   Flexion: 4  Extension: 4    Right Knee   Flexion: 4  Extension: 4    Ambulation     Ambulation: Level Surfaces     Additional Level Surfaces Ambulation Details  ACTIVITIES SPECIFIC BALANCE CONFIDENCE SCALE : 83% confidence score    Observational Gait   Right stance time, left swing time and right swing time within functional limits. Decreased left stance time, left step length and right step length.   Left foot contact pattern: foot flat  Right foot contact pattern: foot flat            Romberg: Firm Eyes Open 20 sec, Eyes Closed 5 sec   Tandem: Firm Eyes Open 5 sec, Eyes Closed 0 sec    Single leg: Firm Eyes Open 0 sec       Treatments:     Therapeutic Exercise:     Neuromuscular Re-Ed:     Gait Training:     Ther-Act:    Manual Therapy:     Modalities:         Assessment & Plan     Assessment  Impairments: abnormal coordination, abnormal gait, impaired balance, impaired physical strength, lacks appropriate home exercise program and safety issue  Assessment details: Multi factoral balance defecit due to L foot, L eye problems, Hx of R TKR and L knee OA.    Prognosis: good    Goals  manage her balance, prevent falls    Plan  Therapy options: will be seen for skilled physical therapy services  Planned modality interventions: TENS, electrical stimulation/Russian stimulation and high voltage pulsed current (pain management)  Planned therapy interventions: abdominal trunk stabilization, manual therapy, balance/weight-bearing training, neuromuscular re-education, body mechanics training, postural training, soft tissue mobilization, orthotic fitting/training, flexibility, spinal/joint mobilization, strengthening, gait training, functional ROM exercises, stretching, therapeutic activities, home exercise program, IADL retraining, transfer training and joint mobilization  Frequency: 2x week  Duration in weeks: 10  Treatment plan discussed with: patient  Plan details: Pending auth       Low complexity due to patient's clinical presentation being stable and uncomplicated by any significant comorbidities that may affect rehab tolerance and progression.     Post-Treatment Symptoms:       Goals:   Active       PT Problem       PT Goal 1       Start:  09/06/24    Expected End:  12/02/24       1. Pt will improve ABC score to 95% confidence  2. Pt will be able to peform tandem stance with eyes closed x 20+ seconds  3. Pt will prevent falls  4. Pt will improve B LE strength and ROM to WNL  5. Pt will be independent with HEP.

## 2024-09-04 ENCOUNTER — PHARMACY VISIT (OUTPATIENT)
Dept: PHARMACY | Facility: CLINIC | Age: 74
End: 2024-09-04
Payer: COMMERCIAL

## 2024-09-06 SDOH — ECONOMIC STABILITY: GENERAL: QUALITY OF LIFE: GOOD

## 2024-09-06 ASSESSMENT — ENCOUNTER SYMPTOMS
PAIN SCALE AT HIGHEST: 5
PAIN SCALE AT LOWEST: 1
PAIN SCALE: 1
PAIN LOCATION: L KNEE
QUALITY: DULL ACHE

## 2024-09-16 ENCOUNTER — TREATMENT (OUTPATIENT)
Dept: PHYSICAL THERAPY | Facility: CLINIC | Age: 74
End: 2024-09-16
Payer: COMMERCIAL

## 2024-09-16 DIAGNOSIS — R26.81 GAIT INSTABILITY: ICD-10-CM

## 2024-09-16 DIAGNOSIS — M25.572 CHRONIC PAIN OF LEFT ANKLE: ICD-10-CM

## 2024-09-16 DIAGNOSIS — M21.079 VALGUS DEFORMITY, NOT ELSEWHERE CLASSIFIED, UNSPECIFIED ANKLE: ICD-10-CM

## 2024-09-16 DIAGNOSIS — M21.42 PES PLANUS OF LEFT FOOT: ICD-10-CM

## 2024-09-16 DIAGNOSIS — M17.12 PRIMARY OSTEOARTHRITIS OF LEFT KNEE: ICD-10-CM

## 2024-09-16 DIAGNOSIS — M25.551 RIGHT HIP PAIN: ICD-10-CM

## 2024-09-16 DIAGNOSIS — G89.29 CHRONIC PAIN OF LEFT ANKLE: ICD-10-CM

## 2024-09-16 PROCEDURE — 97110 THERAPEUTIC EXERCISES: CPT | Mod: GP | Performed by: PHYSICAL THERAPIST

## 2024-09-16 NOTE — PROGRESS NOTES
Physical Therapy Treatment    Patient Name: Amanda Matson  MRN: [id number]  Encounter date: 9/16/2024    Time Calculation  Start Time: 0821  Stop Time: 0902  Time Calculation (min): 41 min  PT Therapeutic Procedures Time Entry  Therapeutic Exercise Time Entry: 40    Visit # 2 of 10  Visits/Dates Authorized: MN    Current Problem:   Problem List Items Addressed This Visit             ICD-10-CM    Gait instability R26.81    Left ankle pain M25.572    Pes planus of left foot M21.42    Primary osteoarthritis of left knee M17.12    Right hip pain M25.551    Valgus deformity, not elsewhere classified, unspecified ankle M21.079          Precautions: n/a          Subjective   General:    L knee is sore today. Getting injection in October.     Pre-Treatment Symptoms:        Objective   Findings:           Treatments:      Therapeutic Exercise:   Nu Step L 5 X 7 min  TB SS green x 3  Shuttle leg press # 20 X 2, # SL L/R x 15, emphasis on alignment and core stability since she hikes pelvis at the end range  Leg curl 60# DL x 20  Leg extension 25# x 15  4 way ankle Green for all but inversion. Used pink for inversion - performed as variable iso and educated pt on arch lift, relationship with tibia and femur  Tball knee flexion Next session  QS and SLR Next session     Neuromuscular Re-Ed:       Therapeutic Activity:      Gait Training:       Manual Therapy:       Modalities:         Assessment:    Good effort. She has significant weakness with inversion/posterior tib and has developed a lot of compensations up the chain. She was able to hold ankle in neutral vs pink     Post-Treatment Symptoms:      N/a  Plan: coontinue to strengthen and work on alignment/compensation issues       Goals:   Active       PT Problem       PT Goal 1       Start:  09/06/24    Expected End:  12/02/24       1. Pt will improve ABC score to 95% confidence  2. Pt will be able to peform tandem stance with eyes closed x 20+ seconds  3. Pt will  prevent falls  4. Pt will improve B LE strength and ROM to WNL  5. Pt will be independent with HEP.

## 2024-09-27 ENCOUNTER — TREATMENT (OUTPATIENT)
Dept: PHYSICAL THERAPY | Facility: CLINIC | Age: 74
End: 2024-09-27
Payer: COMMERCIAL

## 2024-09-27 DIAGNOSIS — M25.551 RIGHT HIP PAIN: ICD-10-CM

## 2024-09-27 DIAGNOSIS — R26.81 GAIT INSTABILITY: ICD-10-CM

## 2024-09-27 DIAGNOSIS — M21.42 PES PLANUS OF LEFT FOOT: ICD-10-CM

## 2024-09-27 DIAGNOSIS — M25.572 CHRONIC PAIN OF LEFT ANKLE: ICD-10-CM

## 2024-09-27 DIAGNOSIS — G89.29 CHRONIC PAIN OF LEFT ANKLE: ICD-10-CM

## 2024-09-27 DIAGNOSIS — M21.079 VALGUS DEFORMITY, NOT ELSEWHERE CLASSIFIED, UNSPECIFIED ANKLE: ICD-10-CM

## 2024-09-27 DIAGNOSIS — M17.12 PRIMARY OSTEOARTHRITIS OF LEFT KNEE: ICD-10-CM

## 2024-09-27 PROCEDURE — 97110 THERAPEUTIC EXERCISES: CPT | Mod: GP,CQ

## 2024-09-27 PROCEDURE — 97112 NEUROMUSCULAR REEDUCATION: CPT | Mod: GP,CQ

## 2024-09-27 ASSESSMENT — PAIN SCALES - GENERAL: PAINLEVEL_OUTOF10: 0 - NO PAIN

## 2024-09-27 ASSESSMENT — PAIN - FUNCTIONAL ASSESSMENT: PAIN_FUNCTIONAL_ASSESSMENT: 0-10

## 2024-09-27 NOTE — PROGRESS NOTES
Physical Therapy Treatment    Patient Name: Amanda Matson  MRN: [id number]  Encounter date: 9/27/2024 PT Received On: 09/27/24  Time Calculation  Start Time: 0750  Stop Time: 0830  Time Calculation (min): 40 min  PT Therapeutic Procedures Time Entry  Neuromuscular Re-Education Time Entry: 10  Therapeutic Exercise Time Entry: 30    Visit # 3 of 10  Visits/Dates Authorized: MN    Current Problem:   Problem List Items Addressed This Visit             ICD-10-CM    Gait instability R26.81    Left ankle pain M25.572    Pes planus of left foot M21.42    Primary osteoarthritis of left knee M17.12    Right hip pain M25.551    Valgus deformity, not elsewhere classified, unspecified ankle M21.079       Precautions: n/a          Subjective   General:   Doing well no issues to report today    Pre-Treatment Symptoms:   Pain Assessment: 0-10  0-10 (Numeric) Pain Score: 0 - No pain    Objective   Findings:       No gait deviations    Treatments:      Therapeutic Exercise:   Nu Step L 5 X 7 min  TB SS green x 3  Standing clam shell green 15x each  Shuttle leg press -162# 20 X 2,       75# SL L/R x 20, emphasis on alignment and core stability since she hikes pelvis at the end range  50#  heel raises 20x  Leg curl 60# DL x 2x15  Leg extension 25#  2x 15  4 way ankle Green for all but inversion. Used pink for inversion - performed as variable iso and educated pt on arch lift, relationship with tibia and femur  Tball knee flexion 20  Tball bridge 20      Neuromuscular Re-Ed:   RB taps and balance  Step tap foam 7 inches  Tandem on step/foam r/l    Therapeutic Activity:      Gait Training:       Manual Therapy:       Modalities:         Assessment:    Good effort. She has significant weakness with inversion/posterior tib and has developed a lot of compensations up the chain. She was able to hold ankle in neutral vs pink     Post-Treatment Symptoms:   Response to Interventions: tired  N/a  Plan: coontinue to strengthen and work on  alignment/compensation issues       Goals:   Active       PT Problem       PT Goal 1       Start:  09/06/24    Expected End:  12/02/24       1. Pt will improve ABC score to 95% confidence  2. Pt will be able to peform tandem stance with eyes closed x 20+ seconds  3. Pt will prevent falls  4. Pt will improve B LE strength and ROM to WNL  5. Pt will be independent with HEP.

## 2024-09-30 ENCOUNTER — TREATMENT (OUTPATIENT)
Dept: PHYSICAL THERAPY | Facility: CLINIC | Age: 74
End: 2024-09-30
Payer: COMMERCIAL

## 2024-09-30 DIAGNOSIS — M17.12 PRIMARY OSTEOARTHRITIS OF LEFT KNEE: ICD-10-CM

## 2024-09-30 DIAGNOSIS — G89.29 CHRONIC PAIN OF LEFT ANKLE: ICD-10-CM

## 2024-09-30 DIAGNOSIS — M21.42 PES PLANUS OF LEFT FOOT: ICD-10-CM

## 2024-09-30 DIAGNOSIS — M21.079 VALGUS DEFORMITY, NOT ELSEWHERE CLASSIFIED, UNSPECIFIED ANKLE: ICD-10-CM

## 2024-09-30 DIAGNOSIS — M25.572 CHRONIC PAIN OF LEFT ANKLE: ICD-10-CM

## 2024-09-30 DIAGNOSIS — R26.81 GAIT INSTABILITY: ICD-10-CM

## 2024-09-30 DIAGNOSIS — M25.551 RIGHT HIP PAIN: ICD-10-CM

## 2024-09-30 PROCEDURE — 97112 NEUROMUSCULAR REEDUCATION: CPT | Mod: GP,CQ

## 2024-09-30 PROCEDURE — 97110 THERAPEUTIC EXERCISES: CPT | Mod: GP,CQ

## 2024-09-30 ASSESSMENT — PAIN - FUNCTIONAL ASSESSMENT: PAIN_FUNCTIONAL_ASSESSMENT: 0-10

## 2024-09-30 ASSESSMENT — PAIN SCALES - GENERAL: PAINLEVEL_OUTOF10: 1

## 2024-09-30 NOTE — PROGRESS NOTES
"Physical Therapy Treatment    Patient Name: Amanda Matson  MRN: [id number]  Encounter date: 9/30/2024    Time Calculation  Start Time: 0900  Stop Time: 0945  Time Calculation (min): 45 min  PT Therapeutic Procedures Time Entry  Neuromuscular Re-Education Time Entry: 23  Therapeutic Exercise Time Entry: 19    Visit # 4 of 10 (actual count -1 , due to eval)  Visits/Dates Authorized: MN    Current Problem:   Problem List Items Addressed This Visit             ICD-10-CM    Gait instability R26.81    Left ankle pain M25.572    Pes planus of left foot M21.42    Primary osteoarthritis of left knee M17.12    Right hip pain M25.551    Valgus deformity, not elsewhere classified, unspecified ankle M21.079         Precautions: n/a          Subjective   General:   Pt states HEP going well, no issues so far. Pt reports she is getting injection in L knee next week, L knee has small discomfort, mainly feels weak.    Pre-Treatment Symptoms:   Pain Assessment: 0-10  0-10 (Numeric) Pain Score: 1 (L knee usual discomfort)    Objective   Findings:    Compression sleeve donned on L knee   No gait deviations    Treatments:      Therapeutic Exercise:   Nu Step L 5 x 6 min , seat 8 arms 9  HR 2x12  TR at wall 2x12 ea  Resisted Gait 4 way 10# x3 ea    Deferred:   TB SS green x 3  Standing clam shell green 15x each  Shuttle leg press -162# 20 X 2,       75# SL L/R x 20, emphasis on alignment and core stability since she hikes pelvis at the end range  50#  heel raises 20x  Leg curl 60# DL x 2x15  Leg extension 25#  2x 15  4 way ankle Green for all but inversion. Used pink for inversion - performed as variable iso and educated pt on arch lift, relationship with tibia and femur  Tball knee flexion 20  Tball bridge 20    Neuromuscular Re-Ed:   Rocker Board AP/Lateral taps x30 and balance 30 sec  Step tap foam 7 inches fw x10 R/L , side x15 R/L  Tandem firm/foam 30 sec x 1 R/L  6\" hurdles in // bars- forward step to/recip x2 ea , side step " x2 ea     Therapeutic Activity:    Gait Training:     Manual Therapy:     Modalities:         Assessment:    Treatment focused on continued light LE strengthening and balance improvements. Pt had moderate LOB with most balance activities, requiring assist from handrails for balance. Pt issued additional HEP and tband at end of session, focusing on exercises covered today.     Post-Treatment Symptoms:   Response to Interventions: little tired    Plan:    continue to strengthen and work on alignment/compensation issues    Goals:   Active       PT Problem       PT Goal 1       Start:  09/06/24    Expected End:  12/02/24       1. Pt will improve ABC score to 95% confidence  2. Pt will be able to peform tandem stance with eyes closed x 20+ seconds  3. Pt will prevent falls  4. Pt will improve B LE strength and ROM to WNL  5. Pt will be independent with HEP.

## 2024-10-01 ENCOUNTER — APPOINTMENT (OUTPATIENT)
Dept: ORTHOPEDIC SURGERY | Facility: CLINIC | Age: 74
End: 2024-10-01
Payer: COMMERCIAL

## 2024-10-01 ENCOUNTER — HOSPITAL ENCOUNTER (OUTPATIENT)
Dept: RADIOLOGY | Facility: EXTERNAL LOCATION | Age: 74
Discharge: HOME | End: 2024-10-01

## 2024-10-01 DIAGNOSIS — M17.12 PRIMARY OSTEOARTHRITIS OF LEFT KNEE: ICD-10-CM

## 2024-10-01 PROCEDURE — 1123F ACP DISCUSS/DSCN MKR DOCD: CPT | Performed by: FAMILY MEDICINE

## 2024-10-01 PROCEDURE — 1159F MED LIST DOCD IN RCRD: CPT | Performed by: FAMILY MEDICINE

## 2024-10-01 PROCEDURE — 20611 DRAIN/INJ JOINT/BURSA W/US: CPT | Performed by: FAMILY MEDICINE

## 2024-10-01 PROCEDURE — 99214 OFFICE O/P EST MOD 30 MIN: CPT | Performed by: FAMILY MEDICINE

## 2024-10-01 PROCEDURE — 1036F TOBACCO NON-USER: CPT | Performed by: FAMILY MEDICINE

## 2024-10-01 RX ORDER — LIDOCAINE HYDROCHLORIDE 10 MG/ML
2 INJECTION, SOLUTION INFILTRATION; PERINEURAL
Status: COMPLETED | OUTPATIENT
Start: 2024-10-01 | End: 2024-10-01

## 2024-10-01 NOTE — PROGRESS NOTES
** Please excuse any errors in grammar or translation related to this dictation. Voice recognition software was utilized to prepare this document. **    Assessment & Plan:   Patient here for ongoing management of left knee arthritis.  She had a positive response to previous steroid injections however pain has been increasing over the past month.  Non-operative treatment options reviewed below.  - Exercise program to strengthen supportive musculature.    - Prescription and otc analgesics to include but not limited to APAP, ibuprofen, naproxen, diclofenac gel.  - Steroid injection. Can repeat at her discretion  - Hyaluronic acid injection.   Durolane injection  completed today for patient's knee arthritis. Discussed with patient that it can take 2-3 weeks for pain relief to occur. Informed patient they may have slight increased discomfort over the next week following injection.  During this time, can continue normal pain relieving strategies (ice, heat, analgesics, etc.) as needed. This injection can be repeated again in 6 months if providing symptomatic relief.   - Referral to pain management for potential nerve ablation.  - After failing non-operative measures, may ultimately require arthroplasty.    Follow-up as needed for ongoing management. All questions answered and patient is agreeable to this plan.       Chief complaint:  Left knee pain    HPI:  10/1/24: Patient following up for left knee pain.  Reports previous steroid injection helped for around 5 months.  Pain has been progressively increasing over the past 4 weeks.  Pain is most problematic when she is ambulatory.  Pain tends to localize the medial aspect of her knee.    4/25/24: Patient states she had about 4 months of relief with CSI. She is still having some relief post injection, but is leaving for a trip to [facility] and requests repeat injection to prevent pain while on vacation.    12/12/23: 72 y/o female patient, hx of right knee total arthroplasty in  2010, presents with left knee pain.  This complaint has been ongoing for a year.  No mechanism of injury reported at onset. Symptoms have progressively worsened with time.  Pain is most prominent at medial knee.  Swelling present intermittently. It is associated with sensation of stiffness, loss of joint motion, crackling/popping sensation.   Symptoms are aggravated by sitting to standing, changing positions, knee flexion,. To date, patient has tried a variety of treatments to include Tylenol, tens unit, cortisone injection. Previously saw Shalom Stahl PA-C for this with last visit being 5/12/23. Last steroid injection completed 5/12/23. She wanted to pursue having a viscosupplement injection opposed to steroid and was referred here for this.     Exam:  LEFT Knee examined. No effusion, ecchymosis, or erythema.  AROM from 5 to 120 deg with 5/5 strength. SILT overlying knee. Motion crepitus present. Tenderness along medial joint line.  No popliteal mass palpated. Negative anterior and posterior drawer.  No laxity to varus or valgus stress at 0 or 30 deg.  No patellar apprehension.      General Exam:  Constitutional - NAD, AAO x 3, conversing appropriately.  HEENT- Normocephalic and atraumatic. No facial deformities. Hearing grossly normal.  Lungs - Breathing non-labored with normal rate. No accessory muscle use.  CV - Extremities warm and well-perfused, brisk capillary refill present.   Neuro - CN II-XII grossly intact.      Results:  X-rays of left knee obtained September 2022: mild arthritic changes.    Procedure:  Patient ID: Amanda Matson is a 74 y.o. female.    L Inj/Asp: L knee on 10/1/2024 10:00 AM  Indications: pain  Details: 21 G needle, ultrasound-guided superolateral approach  Medications: 2 mL lidocaine 10 mg/mL (1 %); 60 mg sodium hyaluronate 60 mg/3 mL  Outcome: tolerated well, no immediate complications    Procedure risk factors to include increased pain, bleeding, infection, neurovascular injury, soft  tissue injury,  and adverse reaction to medication were discussed with the patient. Patient understands there is a moderate risk of morbidity from undergoing the procedure.    Procedure, treatment alternatives, risks and benefits explained, specific risks discussed. Consent was given by the patient. Immediately prior to procedure a time out was called to verify the correct patient, procedure, equipment, support staff and site/side marked as required. Patient was prepped and draped in the usual sterile fashion.

## 2024-10-03 ENCOUNTER — TREATMENT (OUTPATIENT)
Dept: PHYSICAL THERAPY | Facility: CLINIC | Age: 74
End: 2024-10-03
Payer: COMMERCIAL

## 2024-10-03 DIAGNOSIS — R26.81 GAIT INSTABILITY: ICD-10-CM

## 2024-10-03 DIAGNOSIS — G89.29 CHRONIC PAIN OF LEFT ANKLE: ICD-10-CM

## 2024-10-03 DIAGNOSIS — M21.079 VALGUS DEFORMITY, NOT ELSEWHERE CLASSIFIED, UNSPECIFIED ANKLE: ICD-10-CM

## 2024-10-03 DIAGNOSIS — M25.551 RIGHT HIP PAIN: ICD-10-CM

## 2024-10-03 DIAGNOSIS — M21.42 PES PLANUS OF LEFT FOOT: ICD-10-CM

## 2024-10-03 DIAGNOSIS — M17.12 PRIMARY OSTEOARTHRITIS OF LEFT KNEE: ICD-10-CM

## 2024-10-03 DIAGNOSIS — M25.572 CHRONIC PAIN OF LEFT ANKLE: ICD-10-CM

## 2024-10-03 PROCEDURE — 97110 THERAPEUTIC EXERCISES: CPT | Mod: GP | Performed by: PHYSICAL THERAPIST

## 2024-10-03 PROCEDURE — 97112 NEUROMUSCULAR REEDUCATION: CPT | Mod: GP | Performed by: PHYSICAL THERAPIST

## 2024-10-03 NOTE — PROGRESS NOTES
"Physical Therapy Treatment    Patient Name: Amanda Matson  MRN: [id number]  Encounter date: 10/3/2024    Time Calculation  Start Time: 0735  Stop Time: 0815  Time Calculation (min): 40 min  PT Therapeutic Procedures Time Entry  Neuromuscular Re-Education Time Entry: 12  Therapeutic Exercise Time Entry: 28    Visit # 5 of 10 (actual count -1 , due to eval)  Visits/Dates Authorized: MN    Current Problem:   Problem List Items Addressed This Visit             ICD-10-CM    Gait instability R26.81    Left ankle pain M25.572    Pes planus of left foot M21.42    Primary osteoarthritis of left knee M17.12    Right hip pain M25.551    Valgus deformity, not elsewhere classified, unspecified ankle M21.079           Precautions: n/a          Subjective   General:   Pt reports that she got an gel injection in L knee on Monday. It is not worse, but doctor told her it would take about 10 days for her to notice improvements.  Otherwise, has no other complaints. Goes to work right after this PT session.     Pre-Treatment Symptoms:        Objective   Findings:    Compression sleeve donned on L knee   No gait deviations    Treatments:      Therapeutic Exercise:   Nu Step L 5 x 6 min , seat 8 arms 9  HR 2x12  TR at wall 2x12 ea  Resisted Gait 4 way 10# x3 ea  Shuttle leg press -162# 20 X 2,       75# SL L/R x 20, emphasis on alignment and core stability since she hikes pelvis at the end     Deferred:   TB SS green x 3  Standing clam shell green 15x each  range  50#  heel raises 20x  Leg curl 60# DL x 2x15  Leg extension 25#  2x 15  4 way ankle Green for all but inversion. Used pink for inversion - performed as variable iso and educated pt on arch lift, relationship with tibia and femur  Tball knee flexion 20  Tball bridge 20    Neuromuscular Re-Ed:   Rocker Board AP/Lateral taps x30 and balance 30 sec  Step tap foam 7 inches fw x10 R/L , side x15 R/L  Tandem firm/foam 30 sec x 1 R/L  6\" hurdles in // bars- forward step to/recip " x2 ea , side step x2 ea     Therapeutic Activity:    Gait Training:     Manual Therapy:     Modalities:         Assessment:    Patient tolerated treatment well. Patient appears to be working hard in clinic and motivated to decrease pain and restore all functional deficits. Verbal and/or tactile cues given for proper form, and avoidance of substitution patterns with all exercises. All infection control policies followed during this visit. No observed antalgia with exercise performance     Post-Treatment Symptoms:        Plan:    continue to strengthen and work on alignment/compensation issues    Goals:   Active       PT Problem       PT Goal 1       Start:  09/06/24    Expected End:  12/02/24       1. Pt will improve ABC score to 95% confidence  2. Pt will be able to peform tandem stance with eyes closed x 20+ seconds  3. Pt will prevent falls  4. Pt will improve B LE strength and ROM to WNL  5. Pt will be independent with HEP.

## 2024-10-07 ENCOUNTER — TREATMENT (OUTPATIENT)
Dept: PHYSICAL THERAPY | Facility: CLINIC | Age: 74
End: 2024-10-07
Payer: COMMERCIAL

## 2024-10-07 DIAGNOSIS — M21.42 PES PLANUS OF LEFT FOOT: ICD-10-CM

## 2024-10-07 DIAGNOSIS — M21.079 VALGUS DEFORMITY, NOT ELSEWHERE CLASSIFIED, UNSPECIFIED ANKLE: ICD-10-CM

## 2024-10-07 DIAGNOSIS — M17.12 PRIMARY OSTEOARTHRITIS OF LEFT KNEE: ICD-10-CM

## 2024-10-07 DIAGNOSIS — M25.551 RIGHT HIP PAIN: ICD-10-CM

## 2024-10-07 DIAGNOSIS — M25.572 CHRONIC PAIN OF LEFT ANKLE: ICD-10-CM

## 2024-10-07 DIAGNOSIS — G89.29 CHRONIC PAIN OF LEFT ANKLE: ICD-10-CM

## 2024-10-07 DIAGNOSIS — R26.81 GAIT INSTABILITY: ICD-10-CM

## 2024-10-07 PROCEDURE — 97110 THERAPEUTIC EXERCISES: CPT | Mod: GP | Performed by: PHYSICAL THERAPIST

## 2024-10-07 NOTE — PROGRESS NOTES
"  Physical Therapy Treatment    Patient Name: Amanda Matson  MRN: [id number]  Encounter date: 10/7/2024    Time Calculation  Start Time: 0738    Visit # 6 of 20 (actual count -1 , due to eval)  Visits/Dates Authorized: EVAL ONLY-Auth Rcvd 20 visits 9/3-12/31 CPTs [phone] [phone]         Current Problem:   Problem List Items Addressed This Visit             ICD-10-CM    Gait instability R26.81    Left ankle pain M25.572    Pes planus of left foot M21.42    Primary osteoarthritis of left knee M17.12    Right hip pain M25.551    Valgus deformity, not elsewhere classified, unspecified ankle M21.079             Precautions: n/a          Subjective   General:   Pt reports that she did a lot of yard work, noted that her R knee gave way a couple times, not pain, just knee flexed with poor quad control.     Pre-Treatment Symptoms:        Objective   Findings:    Compression sleeve donned on L knee   No gait deviations    Treatments:      Therapeutic Exercise:   Nu Step L 5 x 6 min , seat 8 arms 9  TB SS green x 3  HR 2x12  TR at wall 2x12 ea  4 way ankle Green for all but inversion. Used pink for inversion ea performed as variable iso and educated pt on arch lift, relationship with tibia and femur  Resisted Gait 4x each for and back 10# x3   Shuttle leg press -162# 20 X 2,   75# SL L/R x 20, emphasis on alignment and core stability since she hikes pelvis at the end   Leg curl 60# DL x 2x15  Leg extension 25#  2x 15      Deferred:   Standing clam shell green 15x each  range  50#  heel raises 20x  Tball knee flexion 20  Tball bridge 20    Neuromuscular Re-Ed: DNP  Rocker Board AP/Lateral taps x30 and balance 30 sec  Step tap foam 7 inches fw x10 R/L , side x15 R/L  Tandem firm/foam 30 sec x 1 R/L  6\" hurdles in // bars- forward step to/recip x2 ea , side step x2 ea     Therapeutic Activity:    Gait Training:     Manual Therapy:     Modalities:         Assessment:    Patient tolerated treatment well. Patient " appears to be working hard in clinic and motivated to decrease pain and restore all functional deficits. Verbal and/or tactile cues given for proper form, and avoidance of substitution patterns with all exercises. All infection control policies followed during this visit. No observed antalgia with exercise performance     Post-Treatment Symptoms:        Plan:    continue to strengthen and work on alignment/compensation issues    Goals:   Active       PT Problem       PT Goal 1       Start:  09/06/24    Expected End:  12/02/24       1. Pt will improve ABC score to 95% confidence  2. Pt will be able to peform tandem stance with eyes closed x 20+ seconds  3. Pt will prevent falls  4. Pt will improve B LE strength and ROM to WNL  5. Pt will be independent with HEP.

## 2024-10-11 ENCOUNTER — TREATMENT (OUTPATIENT)
Dept: PHYSICAL THERAPY | Facility: CLINIC | Age: 74
End: 2024-10-11
Payer: COMMERCIAL

## 2024-10-11 DIAGNOSIS — M17.12 PRIMARY OSTEOARTHRITIS OF LEFT KNEE: ICD-10-CM

## 2024-10-11 DIAGNOSIS — R26.81 GAIT INSTABILITY: ICD-10-CM

## 2024-10-11 DIAGNOSIS — M25.572 CHRONIC PAIN OF LEFT ANKLE: ICD-10-CM

## 2024-10-11 DIAGNOSIS — G89.29 CHRONIC PAIN OF LEFT ANKLE: ICD-10-CM

## 2024-10-11 DIAGNOSIS — M21.079 VALGUS DEFORMITY, NOT ELSEWHERE CLASSIFIED, UNSPECIFIED ANKLE: ICD-10-CM

## 2024-10-11 DIAGNOSIS — M25.551 RIGHT HIP PAIN: ICD-10-CM

## 2024-10-11 DIAGNOSIS — M21.42 PES PLANUS OF LEFT FOOT: ICD-10-CM

## 2024-10-11 PROCEDURE — 97110 THERAPEUTIC EXERCISES: CPT | Mod: GP | Performed by: PHYSICAL THERAPIST

## 2024-10-11 NOTE — PROGRESS NOTES
"  Physical Therapy Treatment    Patient Name: Amanda Matson  MRN: [id number]  Encounter date: 10/11/2024    Time Calculation  Start Time: 0730  Stop Time: 0811  Time Calculation (min): 41 min  PT Therapeutic Procedures Time Entry  Therapeutic Exercise Time Entry: 40    Visit # 7 of 20 (actual count -1 , due to eval)  Visits/Dates Authorized: EVAL ONLY-Auth Rcvd 20 visits 9/3-12/31 CPTs [id number]         Current Problem:   Problem List Items Addressed This Visit             ICD-10-CM    Gait instability R26.81    Left ankle pain M25.572    Pes planus of left foot M21.42    Primary osteoarthritis of left knee M17.12    Right hip pain M25.551    Valgus deformity, not elsewhere classified, unspecified ankle M21.079               Precautions: n/a          Subjective   General:   Pt reports that her primary concern is with her R knee with poor quad control is giving way frequently    Pre-Treatment Symptoms:        Objective   Findings:    Compression sleeve donned on L knee   No gait deviations    Treatments:      Therapeutic Exercise:   Nu Step L 5 x 6 min , seat 8 arms 9  TB SS green x 3  TKE R knee purple 15 x 2  HR 2x12 withTKE   3 inch step down with R 10 x 3  Leg curl 60# DL x 2x15  Leg extension 25#  2x 15    DN  TR at wall 2x12 ea  4 way ankle Green for all but inversion. Used pink for inversion ea performed as variable iso and educated pt on arch lift, relationship with tibia and femur  Resisted Gait 4x each for and back 10# x3   Shuttle leg press -162# 20 X 2,   75# SL L/R x 20, emphasis on alignment and core stability since she hikes pelvis at the end         Deferred:   Standing clam shell green 15x each  range  50#  heel raises 20x  Tball knee flexion 20  Tball bridge 20    Neuromuscular Re-Ed: DNP  Rocker Board AP/Lateral taps x30 and balance 30 sec  Step tap foam 7 inches fw x10 R/L , side x15 R/L  Tandem firm/foam 30 sec x 1 R/L  6\" hurdles in // bars- forward step to/recip x2 ea " , side step x2 ea     Therapeutic Activity:    Gait Training:     Manual Therapy:     Modalities:         Assessment:    Patient tolerated treatment well. Patient appears to be working hard in clinic and motivated to decrease pain and restore all functional deficits. Verbal and/or tactile cues given for proper form, and avoidance of substitution patterns with all exercises. All infection control policies followed during this visit. No observed antalgia with exercise performance     Post-Treatment Symptoms:        Plan:    continue to strengthen and work on alignment/compensation issues    Goals:   Active       PT Problem       PT Goal 1       Start:  09/06/24    Expected End:  12/02/24       1. Pt will improve ABC score to 95% confidence  2. Pt will be able to peform tandem stance with eyes closed x 20+ seconds  3. Pt will prevent falls  4. Pt will improve B LE strength and ROM to WNL  5. Pt will be independent with HEP.

## 2024-10-22 PROCEDURE — RXMED WILLOW AMBULATORY MEDICATION CHARGE

## 2024-10-23 ENCOUNTER — APPOINTMENT (OUTPATIENT)
Dept: PHYSICAL THERAPY | Facility: CLINIC | Age: 74
End: 2024-10-23
Payer: COMMERCIAL

## 2024-10-23 ENCOUNTER — PHARMACY VISIT (OUTPATIENT)
Dept: PHARMACY | Facility: CLINIC | Age: 74
End: 2024-10-23
Payer: COMMERCIAL

## 2024-10-25 ENCOUNTER — TREATMENT (OUTPATIENT)
Dept: PHYSICAL THERAPY | Facility: CLINIC | Age: 74
End: 2024-10-25
Payer: COMMERCIAL

## 2024-10-25 DIAGNOSIS — R26.81 GAIT INSTABILITY: ICD-10-CM

## 2024-10-25 DIAGNOSIS — M17.12 PRIMARY OSTEOARTHRITIS OF LEFT KNEE: ICD-10-CM

## 2024-10-25 DIAGNOSIS — M21.079 VALGUS DEFORMITY, NOT ELSEWHERE CLASSIFIED, UNSPECIFIED ANKLE: ICD-10-CM

## 2024-10-25 DIAGNOSIS — M25.551 RIGHT HIP PAIN: ICD-10-CM

## 2024-10-25 DIAGNOSIS — G89.29 CHRONIC PAIN OF LEFT ANKLE: ICD-10-CM

## 2024-10-25 DIAGNOSIS — M25.572 CHRONIC PAIN OF LEFT ANKLE: ICD-10-CM

## 2024-10-25 DIAGNOSIS — M21.42 PES PLANUS OF LEFT FOOT: ICD-10-CM

## 2024-10-25 PROCEDURE — 97110 THERAPEUTIC EXERCISES: CPT | Mod: GP | Performed by: PHYSICAL THERAPIST

## 2024-10-25 NOTE — PROGRESS NOTES
Physical Therapy Treatment    Patient Name: Amanda Matson  MRN: [id number]  Encounter date: 10/25/2024    Time Calculation  Start Time: 0733  Stop Time: 0815  Time Calculation (min): 42 min  PT Therapeutic Procedures Time Entry  Therapeutic Exercise Time Entry: 42    Visit # 8 of 20 (actual count -1 , due to eval)  Visits/Dates Authorized: EVAL ONLY-Auth Rcvd 20 visits 9/3-12/31 CPTs [id number] [phone] [id number]         Current Problem:   Problem List Items Addressed This Visit             ICD-10-CM    Gait instability R26.81    Left ankle pain M25.572    Pes planus of left foot M21.42    Primary osteoarthritis of left knee M17.12    Right hip pain M25.551    Valgus deformity, not elsewhere classified, unspecified ankle M21.079                 Precautions: n/a          Subjective   General:   Pt reports that her primary concern is with her R knee with poor quad control is giving way frequently    Pre-Treatment Symptoms:        Objective   Findings:    Compression sleeve donned on L knee   No gait deviations    Treatments:      Therapeutic Exercise:   Nu Step L 5 x 6 min , seat 8 arms 9  TB SS green x 3  TKE R knee purple 15 x 2  HR 2x12 withTKE   3 inch step down with R 10 x 3  Leg curl 60# DL x 2x15  Leg extension 25#  2x 15    DN  TR at wall 2x12 ea  4 way ankle Green for all but inversion. Used pink for inversion ea performed as variable iso and educated pt on arch lift, relationship with tibia and femur  Resisted Gait 4x each for and back 10# x3   Shuttle leg press -162# 20 X 2,   75# SL L/R x 20, emphasis on alignment and core stability since she hikes pelvis at the end     Access Code: [email or web address]  URL: [email or web address]  Date: 10/25/2024  Prepared by: Peyman Felix    Exercises  - Side Stepping with Resistance at Ankles  - 1 x daily - 5 x weekly - 2 sets - 10 reps  - Long Sitting Ankle Dorsiflexion with Anchored Resistance  - 1 x daily - 5 x weekly - 2 sets - 10 reps  - Ankle Eversion with  "Resistance  - 1 x daily - 5 x weekly - 2 sets - 10 reps  - Ankle Inversion with Resistance  - 1 x daily - 5 x weekly - 2 sets - 10 reps  - Ankle and Toe Plantarflexion with Resistance  - 1 x daily - 5 x weekly - 2 sets - 10 reps  - Standing Terminal Knee Extension with Resistance  - 1 x daily - 5 x weekly - 2 sets - 15 reps  - Step Up  - 1 x daily - 5 x weekly - 2 sets - 15 reps  - Forward Step Down with Heel Tap and Counter Support  - 1 x daily - 5 x weekly - 2 sets - 15 reps  - Step Taps on High Step  - 1 x daily - 5 x weekly - 1 sets - 40 reps  - quad set and straight leg combo  - 1 x daily - 5 x weekly - 2 sets - 15 reps    Deferred:   Standing clam shell green 15x each  range  50#  heel raises 20x  Tball knee flexion 20  Tball bridge 20    Neuromuscular Re-Ed: DNP  Rocker Board AP/Lateral taps x30 and balance 30 sec  Step tap foam 7 inches fw x10 R/L , side x15 R/L  Tandem firm/foam 30 sec x 1 R/L  6\" hurdles in // bars- forward step to/recip x2 ea , side step x2 ea     Therapeutic Activity:    Gait Training:     Manual Therapy:     Modalities:         Assessment:    Patient tolerated treatment well.     Post-Treatment Symptoms:        Plan:    continue to strengthen and work on alignment/compensation issues    Goals:   Active       PT Problem       PT Goal 1       Start:  09/06/24    Expected End:  12/02/24       1. Pt will improve ABC score to 95% confidence  2. Pt will be able to peform tandem stance with eyes closed x 20+ seconds  3. Pt will prevent falls  4. Pt will improve B LE strength and ROM to WNL  5. Pt will be independent with HEP.                        "

## 2024-10-28 ENCOUNTER — APPOINTMENT (OUTPATIENT)
Dept: PHYSICAL THERAPY | Facility: CLINIC | Age: 74
End: 2024-10-28
Payer: COMMERCIAL

## 2024-11-01 ENCOUNTER — TREATMENT (OUTPATIENT)
Dept: PHYSICAL THERAPY | Facility: CLINIC | Age: 74
End: 2024-11-01
Payer: COMMERCIAL

## 2024-11-01 DIAGNOSIS — M25.572 CHRONIC PAIN OF LEFT ANKLE: ICD-10-CM

## 2024-11-01 DIAGNOSIS — M21.42 PES PLANUS OF LEFT FOOT: ICD-10-CM

## 2024-11-01 DIAGNOSIS — M21.079 VALGUS DEFORMITY, NOT ELSEWHERE CLASSIFIED, UNSPECIFIED ANKLE: ICD-10-CM

## 2024-11-01 DIAGNOSIS — M17.12 PRIMARY OSTEOARTHRITIS OF LEFT KNEE: ICD-10-CM

## 2024-11-01 DIAGNOSIS — G89.29 CHRONIC PAIN OF LEFT ANKLE: ICD-10-CM

## 2024-11-01 DIAGNOSIS — R26.81 GAIT INSTABILITY: ICD-10-CM

## 2024-11-01 DIAGNOSIS — M25.551 RIGHT HIP PAIN: ICD-10-CM

## 2024-11-01 PROCEDURE — 97110 THERAPEUTIC EXERCISES: CPT | Mod: GP | Performed by: PHYSICAL THERAPIST

## 2024-11-04 ENCOUNTER — TREATMENT (OUTPATIENT)
Dept: PHYSICAL THERAPY | Facility: CLINIC | Age: 74
End: 2024-11-04
Payer: COMMERCIAL

## 2024-11-04 DIAGNOSIS — G89.29 CHRONIC PAIN OF LEFT ANKLE: ICD-10-CM

## 2024-11-04 DIAGNOSIS — M21.42 PES PLANUS OF LEFT FOOT: ICD-10-CM

## 2024-11-04 DIAGNOSIS — R26.81 GAIT INSTABILITY: ICD-10-CM

## 2024-11-04 DIAGNOSIS — M17.12 PRIMARY OSTEOARTHRITIS OF LEFT KNEE: ICD-10-CM

## 2024-11-04 DIAGNOSIS — M21.079 VALGUS DEFORMITY, NOT ELSEWHERE CLASSIFIED, UNSPECIFIED ANKLE: ICD-10-CM

## 2024-11-04 DIAGNOSIS — M25.572 CHRONIC PAIN OF LEFT ANKLE: ICD-10-CM

## 2024-11-04 DIAGNOSIS — M25.551 RIGHT HIP PAIN: ICD-10-CM

## 2024-11-04 PROCEDURE — 97110 THERAPEUTIC EXERCISES: CPT | Mod: GP | Performed by: PHYSICAL THERAPIST

## 2024-11-04 NOTE — PROGRESS NOTES
Physical Therapy Treatment    Patient Name: Amanda Matson  MRN: [id number]  Encounter date: 11/4/2024    Time Calculation  Start Time: 0815  Stop Time: 0900  Time Calculation (min): 45 min  PT Therapeutic Procedures Time Entry  Therapeutic Exercise Time Entry: 40    Visit # 10 of 20 (actual count -1 , due to eval)  Visits/Dates Authorized: EVAL ONLY-Auth Rcvd 20 visits 9/3-12/31 CPTs 67102 [id number] [phone]         Current Problem:   Problem List Items Addressed This Visit             ICD-10-CM    Gait instability R26.81    Left ankle pain M25.572    Pes planus of left foot M21.42    Primary osteoarthritis of left knee M17.12    Right hip pain M25.551    Valgus deformity, not elsewhere classified, unspecified ankle M21.079       Precautions: n/a          Subjective   General:   Pt reports that her R knee control has improved, not  giving way, however, the L knee has been more painful the last several days.   Pre-Treatment Symptoms:        Objective   Findings:    Compression sleeve donned on L knee   No gait deviations    Treatments:      Therapeutic Exercise:   Nu Step L 6 x 6 min , seat 8 arms 9  TB SS green x 3  Shuttle leg press -162# 20 X 2,   75# SL L/R x 20, emphasis on alignment and core stability since she hikes pelvis at the end   TKE R knee purple 15 x 2  Pot stir black  Ball squish  Leg curl 60# DL x 2x15  Leg extension 25#  2x 15    DNP  HR 2x12 withTKE   3 inch step down with R 10 x 3  TR at wall 2x12 ea  4 way ankle Green for all but inversion. Used pink for inversion ea performed as variable iso and educated pt on arch lift, relationship with tibia and femur  Resisted Gait 4x each for and back 10# x3       Access Code: [email or web address]  URL: [email or web address]  Date: 10/25/2024  Prepared by: Peyman Felix    Exercises  - Side Stepping with Resistance at Ankles  - 1 x daily - 5 x weekly - 2 sets - 10 reps  - Long Sitting Ankle Dorsiflexion with Anchored Resistance  - 1 x daily - 5 x  "weekly - 2 sets - 10 reps  - Ankle Eversion with Resistance  - 1 x daily - 5 x weekly - 2 sets - 10 reps  - Ankle Inversion with Resistance  - 1 x daily - 5 x weekly - 2 sets - 10 reps  - Ankle and Toe Plantarflexion with Resistance  - 1 x daily - 5 x weekly - 2 sets - 10 reps  - Standing Terminal Knee Extension with Resistance  - 1 x daily - 5 x weekly - 2 sets - 15 reps  - Step Up  - 1 x daily - 5 x weekly - 2 sets - 15 reps  - Forward Step Down with Heel Tap and Counter Support  - 1 x daily - 5 x weekly - 2 sets - 15 reps  - Step Taps on High Step  - 1 x daily - 5 x weekly - 1 sets - 40 reps  - quad set and straight leg combo  - 1 x daily - 5 x weekly - 2 sets - 15 reps    Deferred:   Standing clam shell green 15x each  range  50#  heel raises 20x  Tball knee flexion 20  Tball bridge 20    Neuromuscular Re-Ed: DNP  Rocker Board AP/Lateral taps x30 and balance 30 sec  Step tap foam 7 inches fw x10 R/L , side x15 R/L  Tandem firm/foam 30 sec x 1 R/L  6\" hurdles in // bars- forward step to/recip x2 ea , side step x2 ea     Therapeutic Activity:    Gait Training:     Manual Therapy:     Modalities:         Assessment:    Patient tolerated treatment well.     Post-Treatment Symptoms:        Plan:    continue to strengthen and work on alignment/compensation issues    Goals:   Active       PT Problem       PT Goal 1       Start:  09/06/24    Expected End:  12/02/24       1. Pt will improve ABC score to 95% confidence  2. Pt will be able to peform tandem stance with eyes closed x 20+ seconds  3. Pt will prevent falls  4. Pt will improve B LE strength and ROM to WNL  5. Pt will be independent with HEP.                            "

## 2024-11-07 ENCOUNTER — PHARMACY VISIT (OUTPATIENT)
Dept: PHARMACY | Facility: CLINIC | Age: 74
End: 2024-11-07
Payer: COMMERCIAL

## 2024-11-07 PROCEDURE — RXMED WILLOW AMBULATORY MEDICATION CHARGE

## 2024-11-07 RX ORDER — AMOXICILLIN 500 MG/1
CAPSULE ORAL
Qty: 24 CAPSULE | Refills: 1 | OUTPATIENT
Start: 2024-10-28

## 2024-11-08 ENCOUNTER — TREATMENT (OUTPATIENT)
Dept: PHYSICAL THERAPY | Facility: CLINIC | Age: 74
End: 2024-11-08
Payer: COMMERCIAL

## 2024-11-08 DIAGNOSIS — G89.29 CHRONIC PAIN OF LEFT ANKLE: ICD-10-CM

## 2024-11-08 DIAGNOSIS — M17.12 PRIMARY OSTEOARTHRITIS OF LEFT KNEE: ICD-10-CM

## 2024-11-08 DIAGNOSIS — M21.079 VALGUS DEFORMITY, NOT ELSEWHERE CLASSIFIED, UNSPECIFIED ANKLE: ICD-10-CM

## 2024-11-08 DIAGNOSIS — M25.572 CHRONIC PAIN OF LEFT ANKLE: ICD-10-CM

## 2024-11-08 DIAGNOSIS — R26.81 GAIT INSTABILITY: ICD-10-CM

## 2024-11-08 DIAGNOSIS — M21.42 PES PLANUS OF LEFT FOOT: ICD-10-CM

## 2024-11-08 DIAGNOSIS — M25.551 RIGHT HIP PAIN: ICD-10-CM

## 2024-11-08 PROCEDURE — 97110 THERAPEUTIC EXERCISES: CPT | Mod: GP | Performed by: PHYSICAL THERAPIST

## 2024-11-08 PROCEDURE — 97112 NEUROMUSCULAR REEDUCATION: CPT | Mod: GP | Performed by: PHYSICAL THERAPIST

## 2024-11-08 NOTE — PROGRESS NOTES
Physical Therapy Treatment  Progress Report    Patient Name: Amanda Matson  MRN: [id number]  Encounter date: 11/8/2024    Time Calculation  Start Time: 0737    Visit # 11 of 20 (actual count -1 , due to eval)  Visits/Dates Authorized: EVAL ONLY-Auth Rcvd 20 visits 9/3-12/31 CPTs [phone]         Current Problem:   Problem List Items Addressed This Visit             ICD-10-CM    Gait instability R26.81    Left ankle pain M25.572    Pes planus of left foot M21.42    Primary osteoarthritis of left knee M17.12    Right hip pain M25.551    Valgus deformity, not elsewhere classified, unspecified ankle M21.079         Precautions: n/a          Subjective   General:   Pt reports that her R knee control has improved, not  giving way, however, the L knee has been more painful the last several days.   Pre-Treatment Symptoms:        Objective   Findings:      MMT   BI quads and hams 4+/5     Balance   Single leg: Firm Eyes Open 30 sec L, 20 sec R sec  Outcome Measures     ABC Questionnaire: 80%    Treatments:      Therapeutic Exercise:   Nu Step L 6 x 6 min , seat 8 arms 9  TB SS green x 3  Shuttle leg press -162# 20 X 2,   75# SL L/R x 20, emphasis on alignment and core stability since she hikes pelvis at the end   TKE R knee purple 15 x 2  Pot stir black  Ball squish  Leg curl 60# DL x 2x15  Leg extension 25#  2x 15    DNP  HR 2x12 withTKE   3 inch step down with R 10 x 3  TR at wall 2x12 ea  4 way ankle Green for all but inversion. Used pink for inversion ea performed as variable iso and educated pt on arch lift, relationship with tibia and femur  Resisted Gait 4x each for and back 10# x3       Access Code: [email or web address]  URL: [email or web address]  Date: 10/25/2024  Prepared by: Peyman Felix    Exercises  - Side Stepping with Resistance at Ankles  - 1 x daily - 5 x weekly - 2 sets - 10 reps  - Long Sitting Ankle Dorsiflexion with Anchored Resistance  - 1 x daily - 5 x weekly - 2 sets - 10  "reps  - Ankle Eversion with Resistance  - 1 x daily - 5 x weekly - 2 sets - 10 reps  - Ankle Inversion with Resistance  - 1 x daily - 5 x weekly - 2 sets - 10 reps  - Ankle and Toe Plantarflexion with Resistance  - 1 x daily - 5 x weekly - 2 sets - 10 reps  - Standing Terminal Knee Extension with Resistance  - 1 x daily - 5 x weekly - 2 sets - 15 reps  - Step Up  - 1 x daily - 5 x weekly - 2 sets - 15 reps  - Forward Step Down with Heel Tap and Counter Support  - 1 x daily - 5 x weekly - 2 sets - 15 reps  - Step Taps on High Step  - 1 x daily - 5 x weekly - 1 sets - 40 reps  - quad set and straight leg combo  - 1 x daily - 5 x weekly - 2 sets - 15 reps    Deferred:   Standing clam shell green 15x each  range  50#  heel raises 20x  Tball knee flexion 20  Tball bridge 20    Neuromuscular Re-Ed: DNP  Rocker Board AP/Lateral taps x30 and balance 30 sec  Step tap foam 7 inches fw x10 R/L , side x15 R/L  Tandem firm/foam 30 sec x 1 R/L  6\" hurdles in // bars- forward step to/recip x2 ea , side step x2 ea     Therapeutic Activity:    Gait Training:     Manual Therapy:     Modalities:         Assessment:    Pt has been seen for 10 visits with PT for multi-factorial balance problems. She is making progress with balance, R knee control and L knee pain .  ABC score declined from 83 to 80%, however, that does not correlate with her objective measures or other subjective reports. ( It is likely she over estimated scores at al )  She would benefit from continued PT with focus on setting up HEP and gym program, then DC.   Post-Treatment Symptoms:        Plan:    continue to strengthen and work on alignment/compensation issues    Goals:   Active       PT Problem       PT Goal 1       Start:  09/06/24    Expected End:  12/02/24       1. Pt will improve ABC score to 95% confidence  2. Pt will be able to peform tandem stance with eyes closed x 20+ seconds  3. Pt will prevent falls  4. Pt will improve B LE strength and ROM to WNL  5. " Pt will be independent with HEP.

## 2024-11-11 ENCOUNTER — TREATMENT (OUTPATIENT)
Dept: PHYSICAL THERAPY | Facility: CLINIC | Age: 74
End: 2024-11-11
Payer: COMMERCIAL

## 2024-11-11 DIAGNOSIS — M21.079 VALGUS DEFORMITY, NOT ELSEWHERE CLASSIFIED, UNSPECIFIED ANKLE: ICD-10-CM

## 2024-11-11 DIAGNOSIS — M17.12 PRIMARY OSTEOARTHRITIS OF LEFT KNEE: ICD-10-CM

## 2024-11-11 DIAGNOSIS — R26.81 GAIT INSTABILITY: ICD-10-CM

## 2024-11-11 DIAGNOSIS — M21.42 PES PLANUS OF LEFT FOOT: ICD-10-CM

## 2024-11-11 DIAGNOSIS — M25.572 CHRONIC PAIN OF LEFT ANKLE: ICD-10-CM

## 2024-11-11 DIAGNOSIS — M25.551 RIGHT HIP PAIN: ICD-10-CM

## 2024-11-11 DIAGNOSIS — G89.29 CHRONIC PAIN OF LEFT ANKLE: ICD-10-CM

## 2024-11-11 PROCEDURE — 97530 THERAPEUTIC ACTIVITIES: CPT | Mod: GP

## 2024-11-11 PROCEDURE — 97112 NEUROMUSCULAR REEDUCATION: CPT | Mod: GP

## 2024-11-11 PROCEDURE — RXMED WILLOW AMBULATORY MEDICATION CHARGE

## 2024-11-11 ASSESSMENT — PAIN - FUNCTIONAL ASSESSMENT: PAIN_FUNCTIONAL_ASSESSMENT: 0-10

## 2024-11-11 NOTE — PROGRESS NOTES
Physical Therapy Treatment    Patient Name: Amanda Matson  MRN: [id number]  Encounter date: 11/11/2024    Time Calculation  Start Time: 0920  Stop Time: 1000  Time Calculation (min): 40 min  PT Therapeutic Procedures Time Entry  Neuromuscular Re-Education Time Entry: 20  Therapeutic Exercise Time Entry: 5  Therapeutic Activity Time Entry: 15    Visit # 12 of 20 (actual count -1 , due to eval)  Visits/Dates Authorized: EVAL ONLY-Auth Rcvd 20 visits 9/3-12/31 CPTs 20161 [id number] [phone]         Current Problem:   Problem List Items Addressed This Visit             ICD-10-CM    Gait instability R26.81    Left ankle pain M25.572    Pes planus of left foot M21.42    Primary osteoarthritis of left knee M17.12    Right hip pain M25.551    Valgus deformity, not elsewhere classified, unspecified ankle M21.079     Precautions: n/a          Subjective   General:   General Comment: Wearing allbirds today, very comfortable to her. No orthotics today, do not fit in all shoes. Padding applied for comfort due to hammertoes. Has trouble finding exercises in [employer], only has initial few.         Pre-Treatment Symptoms:   Pain Assessment: 0-10  0-10 (Numeric) Pain Score:  (mild at L medial knee)    Objective   Findings:    Severe valgus collapse L foot, pt with rearfoot eversion, collapsed arch, forefoot varus, drop of metatarsal arch    Treatments:      Therapeutic Exercise:   Heel raises from slant board  Linked pt's updated exercises to her [employer] doug    Deferred:  Nu Step L 6 x 6 min , seat 8 arms 9  TB SS green x 3  Shuttle leg press -162# 20 X 2,   75# SL L/R x 20, emphasis on alignment and core stability since she hikes pelvis at the end   TKE R knee purple 15 x 2  Pot stir black  Ball squish  Leg curl 60# DL x 2x15  Leg extension 25#  2x 15  HR 2x12 withTKE   3 inch step down with R 10 x 3  TR at wall 2x12 ea  4 way ankle Green for all but inversion. Used pink for inversion ea performed as variable iso and  "educated pt on arch lift, relationship with tibia and femur  Resisted Gait 4x each for and back 10# x3   Standing clam shell green 15x each  range  50#  heel raises 20x  Tball knee flexion 20  Tball bridge 20    Neuromuscular Re-Ed:  SLS with postings  Rocker Board AP/Lateral taps x30 and balance 30 sec  Standing L WB FW/BW and eversion (R stance on 3in block)  Step tap foam 7 inches fw x10 R/L , side x15 R/L  Tandem firm/foam 30 sec x 1 R/L  6\" hurdles in // bars- forward step to/recip x2 ea , side step x2 ea     Therapeutic Activity:  11/11/24: Instructed benefit of [employer] shoes for correction of overpronation. Instructed and applied postings to [employer] shoe insole including L metatarsal raise, L foot 4 degree forefoot varus post and L 4 degree rearfoot varus post.      Gait Training:     Manual Therapy:     Modalities:       HEP/Access Codes:  Access Code: [email or web address] Date: 10/25/2024   - Side Stepping with Resistance at Ankles  - 1 x daily - 5 x weekly - 2 sets - 10 reps  - Long Sitting Ankle Dorsiflexion with Anchored Resistance  - 1 x daily - 5 x weekly - 2 sets - 10 reps  - Ankle Eversion with Resistance  - 1 x daily - 5 x weekly - 2 sets - 10 reps  - Ankle Inversion with Resistance  - 1 x daily - 5 x weekly - 2 sets - 10 reps  - Ankle and Toe Plantarflexion with Resistance  - 1 x daily - 5 x weekly - 2 sets - 10 reps  - Standing Terminal Knee Extension with Resistance  - 1 x daily - 5 x weekly - 2 sets - 15 reps  - Step Up  - 1 x daily - 5 x weekly - 2 sets - 15 reps  - Forward Step Down with Heel Tap and Counter Support  - 1 x daily - 5 x weekly - 2 sets - 15 reps  - Step Taps on High Step  - 1 x daily - 5 x weekly - 1 sets - 40 reps  - quad set and straight leg combo  - 1 x daily - 5 x weekly - 2 sets - 15 reps      Assessment:   PT Assessment  Assessment Comment: Pt with reduced valgus collapse L stance with addition of postings, pt perceived benefit. Addtional postings would be ideal but unable to do " without arch support as well. Pt receptive to idea of [employer] shoes, may tolerate 6 degrees of correction when arch has support as well via orthotic and/or shoe.    Post-Treatment Symptoms:   Response to Interventions: fatigue greater than pain, felt better with postings    Plan:    continue to strengthen and work on alignment/compensation issues    Goals:   Active       PT Problem       PT Goal 1       Start:  09/06/24    Expected End:  12/02/24       1. Pt will improve ABC score to 95% confidence  2. Pt will be able to peform tandem stance with eyes closed x 20+ seconds  3. Pt will prevent falls  4. Pt will improve B LE strength and ROM to WNL  5. Pt will be independent with HEP.

## 2024-11-13 ENCOUNTER — PHARMACY VISIT (OUTPATIENT)
Dept: PHARMACY | Facility: CLINIC | Age: 74
End: 2024-11-13
Payer: COMMERCIAL

## 2024-11-15 ENCOUNTER — TREATMENT (OUTPATIENT)
Dept: PHYSICAL THERAPY | Facility: CLINIC | Age: 74
End: 2024-11-15
Payer: COMMERCIAL

## 2024-11-15 DIAGNOSIS — M25.551 RIGHT HIP PAIN: ICD-10-CM

## 2024-11-15 DIAGNOSIS — M21.42 PES PLANUS OF LEFT FOOT: ICD-10-CM

## 2024-11-15 DIAGNOSIS — M25.572 CHRONIC PAIN OF LEFT ANKLE: ICD-10-CM

## 2024-11-15 DIAGNOSIS — M17.12 PRIMARY OSTEOARTHRITIS OF LEFT KNEE: ICD-10-CM

## 2024-11-15 DIAGNOSIS — M21.079 VALGUS DEFORMITY, NOT ELSEWHERE CLASSIFIED, UNSPECIFIED ANKLE: ICD-10-CM

## 2024-11-15 DIAGNOSIS — R26.81 GAIT INSTABILITY: ICD-10-CM

## 2024-11-15 DIAGNOSIS — G89.29 CHRONIC PAIN OF LEFT ANKLE: ICD-10-CM

## 2024-11-15 PROCEDURE — 97110 THERAPEUTIC EXERCISES: CPT | Mod: GP

## 2024-11-15 PROCEDURE — 97112 NEUROMUSCULAR REEDUCATION: CPT | Mod: GP

## 2024-11-15 ASSESSMENT — PAIN SCALES - GENERAL: PAINLEVEL_OUTOF10: 3

## 2024-11-15 ASSESSMENT — PAIN - FUNCTIONAL ASSESSMENT: PAIN_FUNCTIONAL_ASSESSMENT: 0-10

## 2024-11-15 NOTE — PROGRESS NOTES
"  Physical Therapy Treatment    Patient Name: Amanda Matson  MRN: [id number]  Encounter date: 11/15/2024    Time Calculation  Start Time: 0730  Stop Time: 0811  Time Calculation (min): 41 min  PT Therapeutic Procedures Time Entry  Neuromuscular Re-Education Time Entry: 20  Therapeutic Exercise Time Entry: 20    Visit # 13 of 20 (actual count -1 , due to eval)  Visits/Dates Authorized: EVAL ONLY-Auth Rcvd 20 visits 9/3-12/31 CPTs [id number]         Current Problem:   Problem List Items Addressed This Visit             ICD-10-CM    Gait instability R26.81    Left ankle pain M25.572    Pes planus of left foot M21.42    Primary osteoarthritis of left knee M17.12    Right hip pain M25.551    Valgus deformity, not elsewhere classified, unspecified ankle M21.079       Precautions: n/a          Subjective   General: Pt states her L knee is a little sore this morning. PT has been helping overall with her balance/leg strength. She received her updated HEP.             Pre-Treatment Symptoms:   Pain Assessment: 0-10  0-10 (Numeric) Pain Score: 3    Objective   Findings:    Severe valgus collapse L foot, pt with rearfoot eversion, collapsed arch, forefoot varus, drop of metatarsal arch    Treatments:      Therapeutic Exercise:   Nu Step L 5 x 4 min , seat 8 arms 9  Shuttle leg press DL 62# x12, 100# x 10, 137# x 8,   50# SL L/R 2x12  Sit to stand x12  Staggered sit to stand L/R, R/L x 10 each  Step ups 7 inch fwd x10 L/R  Lateral step overs 7 inch x8 L/R  Heel raise in standing 2x10  Toe raise in standing x 10  Resisted Gait 4x each for and back 10# x3   SLS w/ opp leg kicks pink lateral x 10, fwd/back x 10  Single leg RDL L/R x 10 each    Neuromuscular Re-Ed:    Step tap foam 7 inches fw x10 R/L , side x15 R/L  6\" hurdles no UE support fwd/lateral x 4 each  Foam balance beam tandem walk fwd x2, lateral x 2  Foam SLS mod L/R  Foam balance beam w/ arm raises, EC, EC w/ arm raises  Toe taps firm 9 inches x " 20    Therapeutic Activity:        Gait Training:     Manual Therapy:     Modalities:       HEP/Access Codes:  Access Code: [email or web address] Date: 10/25/2024   - Side Stepping with Resistance at Ankles  - 1 x daily - 5 x weekly - 2 sets - 10 reps  - Long Sitting Ankle Dorsiflexion with Anchored Resistance  - 1 x daily - 5 x weekly - 2 sets - 10 reps  - Ankle Eversion with Resistance  - 1 x daily - 5 x weekly - 2 sets - 10 reps  - Ankle Inversion with Resistance  - 1 x daily - 5 x weekly - 2 sets - 10 reps  - Ankle and Toe Plantarflexion with Resistance  - 1 x daily - 5 x weekly - 2 sets - 10 reps  - Standing Terminal Knee Extension with Resistance  - 1 x daily - 5 x weekly - 2 sets - 15 reps  - Step Up  - 1 x daily - 5 x weekly - 2 sets - 15 reps  - Forward Step Down with Heel Tap and Counter Support  - 1 x daily - 5 x weekly - 2 sets - 15 reps  - Step Taps on High Step  - 1 x daily - 5 x weekly - 1 sets - 40 reps  - quad set and straight leg combo  - 1 x daily - 5 x weekly - 2 sets - 15 reps      Assessment:    Pt continues to work on building her hip/knee and ankle strength to improve her gait and balance. Pt reported no increased knee pain during exercises.     Post-Treatment Symptoms:        Plan:    continue to strengthen and work on alignment/compensation issues    Goals:   Active       PT Problem       PT Goal 1       Start:  09/06/24    Expected End:  12/02/24       1. Pt will improve ABC score to 95% confidence  2. Pt will be able to peform tandem stance with eyes closed x 20+ seconds  3. Pt will prevent falls  4. Pt will improve B LE strength and ROM to WNL  5. Pt will be independent with HEP.

## 2024-11-18 ENCOUNTER — TREATMENT (OUTPATIENT)
Dept: PHYSICAL THERAPY | Facility: CLINIC | Age: 74
End: 2024-11-18
Payer: COMMERCIAL

## 2024-11-18 DIAGNOSIS — R26.81 GAIT INSTABILITY: ICD-10-CM

## 2024-11-18 DIAGNOSIS — M21.079 VALGUS DEFORMITY, NOT ELSEWHERE CLASSIFIED, UNSPECIFIED ANKLE: ICD-10-CM

## 2024-11-18 DIAGNOSIS — M25.572 CHRONIC PAIN OF LEFT ANKLE: ICD-10-CM

## 2024-11-18 DIAGNOSIS — G89.29 CHRONIC PAIN OF LEFT ANKLE: ICD-10-CM

## 2024-11-18 DIAGNOSIS — M25.551 RIGHT HIP PAIN: ICD-10-CM

## 2024-11-18 DIAGNOSIS — M17.12 PRIMARY OSTEOARTHRITIS OF LEFT KNEE: ICD-10-CM

## 2024-11-18 DIAGNOSIS — M21.42 PES PLANUS OF LEFT FOOT: ICD-10-CM

## 2024-11-18 PROCEDURE — 97110 THERAPEUTIC EXERCISES: CPT | Mod: GP

## 2024-11-18 PROCEDURE — 97112 NEUROMUSCULAR REEDUCATION: CPT | Mod: GP

## 2024-11-18 ASSESSMENT — PAIN - FUNCTIONAL ASSESSMENT: PAIN_FUNCTIONAL_ASSESSMENT: 0-10

## 2024-11-18 ASSESSMENT — PAIN SCALES - GENERAL: PAINLEVEL_OUTOF10: 2

## 2024-11-18 NOTE — PROGRESS NOTES
"  Physical Therapy Treatment    Patient Name: Amanda Matson  MRN: [id number]  Encounter date: 11/18/2024    Time Calculation  Start Time: 0823  Stop Time: 0858  Time Calculation (min): 35 min  PT Therapeutic Procedures Time Entry  Neuromuscular Re-Education Time Entry: 15  Therapeutic Exercise Time Entry: 20    Visit # 14 of 20 (actual count -1 , due to eval)  Visits/Dates Authorized: EVAL ONLY-Auth Rcvd 20 visits 9/3-12/31 CPTs [id number]         Current Problem:   Problem List Items Addressed This Visit             ICD-10-CM    Gait instability R26.81    Left ankle pain M25.572    Pes planus of left foot M21.42    Primary osteoarthritis of left knee M17.12    Right hip pain M25.551    Valgus deformity, not elsewhere classified, unspecified ankle M21.079         Precautions: n/a          Subjective   General: Pt states her L knee was sore after her last visit.             Pre-Treatment Symptoms:   Pain Assessment: 0-10  0-10 (Numeric) Pain Score: 2  Pain Location: Knee    Objective   Findings:    Severe valgus collapse L foot, pt with rearfoot eversion, collapsed arch, forefoot varus, drop of metatarsal arch    Pt 8 minutes late to her appointment.    Treatments:      Therapeutic Exercise:   Nu Step L 5 x 4 min , seat 8 arms 9  Sit to stand 2x12 from elevated seat height to reduce knee pain and 10# coreball  Heel raise in standing 2x10  Toe raise against wall 2x12  Toe walk side step along counter 15 feet x 4 L/R  Resisted Gait 4x each for and back 12.5# x3   Seated Tloop ankle PF red 2x20 L/R    Neuromuscular Re-Ed:  Step tap firm 7 inches x30\", foam x 30\"  12\" hurdles no UE support fwd/lateral x 4 each  Foam SLS L/R  AP/ML rockerboard EO x 30\" each, EC x 30\" each  Foam balance beam w/ arm raises, EC, EC w/ arm raises  Mod SLS L/R  Walking with head turns L/R, up/down    Therapeutic Activity:        Gait Training:     Manual Therapy:     Modalities:       HEP/Access Codes:  Access Code: [email or web address] " Date: 10/25/2024   - Side Stepping with Resistance at Ankles  - 1 x daily - 5 x weekly - 2 sets - 10 reps  - Long Sitting Ankle Dorsiflexion with Anchored Resistance  - 1 x daily - 5 x weekly - 2 sets - 10 reps  - Ankle Eversion with Resistance  - 1 x daily - 5 x weekly - 2 sets - 10 reps  - Ankle Inversion with Resistance  - 1 x daily - 5 x weekly - 2 sets - 10 reps  - Ankle and Toe Plantarflexion with Resistance  - 1 x daily - 5 x weekly - 2 sets - 10 reps  - Standing Terminal Knee Extension with Resistance  - 1 x daily - 5 x weekly - 2 sets - 15 reps  - Step Up  - 1 x daily - 5 x weekly - 2 sets - 15 reps  - Forward Step Down with Heel Tap and Counter Support  - 1 x daily - 5 x weekly - 2 sets - 15 reps  - Step Taps on High Step  - 1 x daily - 5 x weekly - 1 sets - 40 reps  - quad set and straight leg combo  - 1 x daily - 5 x weekly - 2 sets - 15 reps      Assessment:    Pt reported no increased knee pain this visit after modifying her LE strengthening exercises. Pt worked on balance training and ankle stability/strengthening.     Post-Treatment Symptoms:    Response to Interventions: No change in pain     Plan:    continue to strengthen and work on alignment/compensation issues    Goals:   Active       PT Problem       PT Goal 1       Start:  09/06/24    Expected End:  12/02/24       1. Pt will improve ABC score to 95% confidence  2. Pt will be able to peform tandem stance with eyes closed x 20+ seconds  3. Pt will prevent falls  4. Pt will improve B LE strength and ROM to WNL  5. Pt will be independent with HEP.

## 2024-11-22 ENCOUNTER — TREATMENT (OUTPATIENT)
Dept: PHYSICAL THERAPY | Facility: CLINIC | Age: 74
End: 2024-11-22
Payer: COMMERCIAL

## 2024-11-22 DIAGNOSIS — M25.572 CHRONIC PAIN OF LEFT ANKLE: ICD-10-CM

## 2024-11-22 DIAGNOSIS — G89.29 CHRONIC PAIN OF LEFT ANKLE: ICD-10-CM

## 2024-11-22 DIAGNOSIS — M25.551 RIGHT HIP PAIN: ICD-10-CM

## 2024-11-22 DIAGNOSIS — M21.079 VALGUS DEFORMITY, NOT ELSEWHERE CLASSIFIED, UNSPECIFIED ANKLE: ICD-10-CM

## 2024-11-22 DIAGNOSIS — M17.12 PRIMARY OSTEOARTHRITIS OF LEFT KNEE: ICD-10-CM

## 2024-11-22 DIAGNOSIS — M21.42 PES PLANUS OF LEFT FOOT: ICD-10-CM

## 2024-11-22 DIAGNOSIS — R26.81 GAIT INSTABILITY: ICD-10-CM

## 2024-11-22 PROCEDURE — 97112 NEUROMUSCULAR REEDUCATION: CPT | Mod: GP

## 2024-11-22 PROCEDURE — 97110 THERAPEUTIC EXERCISES: CPT | Mod: GP

## 2024-11-22 ASSESSMENT — PAIN SCALES - GENERAL: PAINLEVEL_OUTOF10: 5 - MODERATE PAIN

## 2024-11-22 ASSESSMENT — PAIN - FUNCTIONAL ASSESSMENT: PAIN_FUNCTIONAL_ASSESSMENT: 0-10

## 2024-11-22 NOTE — PROGRESS NOTES
"  Physical Therapy Treatment    Patient Name: Amanda Matson  MRN: [id number]  Encounter date: 11/22/2024    Time Calculation  Start Time: 0730  Stop Time: 0813  Time Calculation (min): 43 min  PT Therapeutic Procedures Time Entry  Neuromuscular Re-Education Time Entry: 17  Therapeutic Exercise Time Entry: 24    Visit # 15 of 20 (actual count -1 , due to eval)  Visits/Dates Authorized: EVAL ONLY-Auth Rcvd 20 visits 9/3-12/31 CPTs [id number]     Current Problem:   Problem List Items Addressed This Visit             ICD-10-CM    Gait instability R26.81    Left ankle pain M25.572    Pes planus of left foot M21.42    Primary osteoarthritis of left knee M17.12    Right hip pain M25.551    Valgus deformity, not elsewhere classified, unspecified ankle M21.079     Precautions: n/a          Subjective   General:    L knee stiff and achy this morning, but exercise always sees to help.         Pre-Treatment Symptoms:   Pain Assessment: 0-10  0-10 (Numeric) Pain Score: 5 - Moderate pain    Objective   Findings:    Severe valgus collapse L foot, pt with rearfoot eversion, collapsed arch, forefoot varus, drop of metatarsal arch      Treatments:      Therapeutic Exercise:   Nu Step L 5 x 6 min , seat 8 arms 9  Shuttle DL 75# 2x12  Shuttle SL 25# on L, 37# on R x10 each  Resisted Gait 4-way 12.5# x3   Heel raise in standing 2x12  Toe raise against wall 2x12  Toe walk side step along counter 15 feet x 4 L/R    DNP  Sit to stand 2x12 from elevated seat height to reduce knee pain and 10# coreball  Seated Tloop ankle PF red 2x20 L/R    Neuromuscular Re-Ed:  Step tap 7 inches foam fw and lat x12 each  12\" hurdles no UE support fwd/lateral x 4 each  Foam SLS L/R 2x15\" each  RB AP rocking/balance, lateral balance EO x 30\" each  Foam balance beam tandem fw walks  Walking with head turns L/R, up/down    DNP  Mod SLS L/R    HEP/Access Codes:  Access Code: [email or web address] Date: 10/25/2024   - Side Stepping with Resistance at Ankles "  - 1 x daily - 5 x weekly - 2 sets - 10 reps  - Long Sitting Ankle Dorsiflexion with Anchored Resistance  - 1 x daily - 5 x weekly - 2 sets - 10 reps  - Ankle Eversion with Resistance  - 1 x daily - 5 x weekly - 2 sets - 10 reps  - Ankle Inversion with Resistance  - 1 x daily - 5 x weekly - 2 sets - 10 reps  - Ankle and Toe Plantarflexion with Resistance  - 1 x daily - 5 x weekly - 2 sets - 10 reps  - Standing Terminal Knee Extension with Resistance  - 1 x daily - 5 x weekly - 2 sets - 15 reps  - Step Up  - 1 x daily - 5 x weekly - 2 sets - 15 reps  - Forward Step Down with Heel Tap and Counter Support  - 1 x daily - 5 x weekly - 2 sets - 15 reps  - Step Taps on High Step  - 1 x daily - 5 x weekly - 1 sets - 40 reps  - quad set and straight leg combo  - 1 x daily - 5 x weekly - 2 sets - 15 reps      Assessment:    Pt continues to ambulate with valgus collapse of L foot and knee. Mild L knee discomfort with L shuttle squat, lighter resistance than R. Challenged with balance exercises on foam surface.    Post-Treatment Symptoms:    Response to Interventions: No change in pain     Plan:    continue to strengthen and work on alignment/compensation issues    Goals:   Active       PT Problem       PT Goal 1       Start:  09/06/24    Expected End:  12/02/24       1. Pt will improve ABC score to 95% confidence  2. Pt will be able to peform tandem stance with eyes closed x 20+ seconds  3. Pt will prevent falls  4. Pt will improve B LE strength and ROM to WNL  5. Pt will be independent with HEP.

## 2024-11-25 ENCOUNTER — APPOINTMENT (OUTPATIENT)
Dept: PHYSICAL THERAPY | Facility: CLINIC | Age: 74
End: 2024-11-25
Payer: COMMERCIAL

## 2024-12-02 ENCOUNTER — TREATMENT (OUTPATIENT)
Dept: PHYSICAL THERAPY | Facility: CLINIC | Age: 74
End: 2024-12-02
Payer: COMMERCIAL

## 2024-12-02 DIAGNOSIS — G89.29 CHRONIC PAIN OF LEFT ANKLE: ICD-10-CM

## 2024-12-02 DIAGNOSIS — M25.551 RIGHT HIP PAIN: ICD-10-CM

## 2024-12-02 DIAGNOSIS — M17.12 PRIMARY OSTEOARTHRITIS OF LEFT KNEE: ICD-10-CM

## 2024-12-02 DIAGNOSIS — R26.81 GAIT INSTABILITY: ICD-10-CM

## 2024-12-02 DIAGNOSIS — M21.42 PES PLANUS OF LEFT FOOT: ICD-10-CM

## 2024-12-02 DIAGNOSIS — M21.079 VALGUS DEFORMITY, NOT ELSEWHERE CLASSIFIED, UNSPECIFIED ANKLE: ICD-10-CM

## 2024-12-02 DIAGNOSIS — M25.572 CHRONIC PAIN OF LEFT ANKLE: ICD-10-CM

## 2024-12-02 PROCEDURE — 97110 THERAPEUTIC EXERCISES: CPT | Mod: GP,CQ

## 2024-12-02 PROCEDURE — 97112 NEUROMUSCULAR REEDUCATION: CPT | Mod: GP,CQ

## 2024-12-02 ASSESSMENT — PAIN - FUNCTIONAL ASSESSMENT: PAIN_FUNCTIONAL_ASSESSMENT: 0-10

## 2024-12-02 ASSESSMENT — PAIN SCALES - GENERAL: PAINLEVEL_OUTOF10: 4

## 2024-12-02 NOTE — PROGRESS NOTES
"  Physical Therapy Treatment    Patient Name: Amanda Matson  MRN: [id number]  Encounter date: 12/2/2024    Time Calculation  Start Time: 1020  Stop Time: 1106  Time Calculation (min): 46 min  PT Therapeutic Procedures Time Entry  Neuromuscular Re-Education Time Entry: 20  Therapeutic Exercise Time Entry: 26    Visit # 16 of 20 (actual count -1 , due to eval)  Visits/Dates Authorized: EVAL ONLY-Auth Rcvd 20 visits 9/3-12/31 CPTs [phone] [id number]     Current Problem:   Problem List Items Addressed This Visit             ICD-10-CM    Gait instability R26.81    Left ankle pain M25.572    Pes planus of left foot M21.42    Primary osteoarthritis of left knee M17.12    Right hip pain M25.551    Valgus deformity, not elsewhere classified, unspecified ankle M21.079       Precautions: n/a          Subjective   General:    Pt reports good tolerance to last session and is feeling some overall improvement with LE strength and balance.  Pt denies any falls in the last 3 weeks.  Pt reports continued soreness in L knee.        Pre-Treatment Symptoms:   Pain Assessment: 0-10  0-10 (Numeric) Pain Score: 4  Pain Location: Knee    Objective   Findings:    Severe valgus collapse L foot, pt with rearfoot eversion, collapsed arch, forefoot varus, drop of metatarsal arch      Treatments:      Therapeutic Exercise:   Nu Step L 5 x 6 min , seat 8 arms 9  Heel raise in standing 2x12  Toe raise against wall 2x12  Shuttle DL 75# 2x15  Shuttle SL 25# on L, 37.5# on R x 15 each  Resisted Gait 4-way 12.5# x3     DNP  Sit to stand 2x12 from elevated seat height to reduce knee pain and 10# coreball  Seated Tloop ankle PF red 2x20 L/R  Toe walk side step along counter 15 feet x 4 L/R    Neuromuscular Re-Ed:  12\" hurdles no UE support fwd/lateral x 4 each  Foam balance beam tandem fw walks  Walking with head turns L/R, up/down  RB AP rocking/balance, lateral balance EO x 30\" each  Foam SLS L/R 2x20\" each  Step tap 7 inches foam fw and lat " 2 x 10 each    DNP  Mod SLS L/R    HEP/Access Codes:  Access Code: [email or web address] Date: 10/25/2024   - Side Stepping with Resistance at Ankles  - 1 x daily - 5 x weekly - 2 sets - 10 reps  - Long Sitting Ankle Dorsiflexion with Anchored Resistance  - 1 x daily - 5 x weekly - 2 sets - 10 reps  - Ankle Eversion with Resistance  - 1 x daily - 5 x weekly - 2 sets - 10 reps  - Ankle Inversion with Resistance  - 1 x daily - 5 x weekly - 2 sets - 10 reps  - Ankle and Toe Plantarflexion with Resistance  - 1 x daily - 5 x weekly - 2 sets - 10 reps  - Standing Terminal Knee Extension with Resistance  - 1 x daily - 5 x weekly - 2 sets - 15 reps  - Step Up  - 1 x daily - 5 x weekly - 2 sets - 15 reps  - Forward Step Down with Heel Tap and Counter Support  - 1 x daily - 5 x weekly - 2 sets - 15 reps  - Step Taps on High Step  - 1 x daily - 5 x weekly - 1 sets - 40 reps  - quad set and straight leg combo  - 1 x daily - 5 x weekly - 2 sets - 15 reps      Assessment:    Patient tolerated treatment well. Patient appears to be working hard in clinic and motivated to decrease pain and restore all functional deficits. Verbal and/or tactile cues given for proper form, and avoidance of substitution patterns with all exercises. All infection control policies followed during this visit. No observed antalgia with exercise performance, though did require intermittent CGA with various balance exercises.      Post-Treatment Symptoms:      Fatigue;  no change in pain.     Plan:    continue to strengthen and work on alignment/compensation issues    Goals:   Active       PT Problem       PT Goal 1       Start:  09/06/24    Expected End:  12/02/24       1. Pt will improve ABC score to 95% confidence  2. Pt will be able to peform tandem stance with eyes closed x 20+ seconds  3. Pt will prevent falls  4. Pt will improve B LE strength and ROM to WNL  5. Pt will be independent with HEP.

## 2024-12-03 ENCOUNTER — APPOINTMENT (OUTPATIENT)
Dept: PHYSICAL THERAPY | Facility: CLINIC | Age: 74
End: 2024-12-03
Payer: COMMERCIAL

## 2024-12-06 ENCOUNTER — TREATMENT (OUTPATIENT)
Dept: PHYSICAL THERAPY | Facility: CLINIC | Age: 74
End: 2024-12-06
Payer: COMMERCIAL

## 2024-12-06 DIAGNOSIS — M21.42 PES PLANUS OF LEFT FOOT: ICD-10-CM

## 2024-12-06 DIAGNOSIS — G89.29 CHRONIC PAIN OF LEFT ANKLE: ICD-10-CM

## 2024-12-06 DIAGNOSIS — M21.079 VALGUS DEFORMITY, NOT ELSEWHERE CLASSIFIED, UNSPECIFIED ANKLE: ICD-10-CM

## 2024-12-06 DIAGNOSIS — M25.551 RIGHT HIP PAIN: ICD-10-CM

## 2024-12-06 DIAGNOSIS — R26.81 GAIT INSTABILITY: ICD-10-CM

## 2024-12-06 DIAGNOSIS — M17.12 PRIMARY OSTEOARTHRITIS OF LEFT KNEE: ICD-10-CM

## 2024-12-06 DIAGNOSIS — M25.572 CHRONIC PAIN OF LEFT ANKLE: ICD-10-CM

## 2024-12-06 PROCEDURE — 97112 NEUROMUSCULAR REEDUCATION: CPT | Mod: GP,CQ

## 2024-12-06 PROCEDURE — 97110 THERAPEUTIC EXERCISES: CPT | Mod: GP,CQ

## 2024-12-06 ASSESSMENT — PAIN SCALES - GENERAL: PAINLEVEL_OUTOF10: 0 - NO PAIN

## 2024-12-06 ASSESSMENT — PAIN - FUNCTIONAL ASSESSMENT: PAIN_FUNCTIONAL_ASSESSMENT: 0-10

## 2024-12-06 NOTE — PROGRESS NOTES
"  Physical Therapy Treatment    Patient Name: Amanda Matson  MRN: [id number]  Encounter date: 12/6/2024    Time Calculation  Start Time: 0734  Stop Time: 0814  Time Calculation (min): 40 min  PT Therapeutic Procedures Time Entry  Neuromuscular Re-Education Time Entry: 10  Therapeutic Exercise Time Entry: 28    Visit # 17 of 20 (actual count -1 , due to eval)  Visits/Dates Authorized: EVAL ONLY-Auth Rcvd 20 visits 9/3-12/31 CPTs [id number] [phone] [id number]     Current Problem:   Problem List Items Addressed This Visit             ICD-10-CM    Gait instability R26.81    Left ankle pain M25.572    Pes planus of left foot M21.42    Primary osteoarthritis of left knee M17.12    Right hip pain M25.551    Valgus deformity, not elsewhere classified, unspecified ankle M21.079         Precautions:    N/A       Subjective   General:    Patient states the L knee isn't as sore as previous visit. Patient reports they have been doing HEP, has at home rocker board she cane use.         Pre-Treatment Symptoms:   Pain Assessment: 0-10  0-10 (Numeric) Pain Score: 0 - No pain (sore L knee)    Objective   Findings:    Severe valgus collapse L foot, pt with rearfoot eversion, collapsed arch, forefoot varus, drop of metatarsal arch      Treatments:      Therapeutic Exercise:   Nu Step L 5 x 6 min , seat 8 arms 9  Resisted Gait 4-way f/b 15# , side 12.5# x3   Shuttle DL # 2x15  Shuttle SL 50# 1x15 R/L     DNP:  Heel raise in standing 2x12  Toe raise against wall 2x12  Sit to stand 2x12 from elevated seat height to reduce knee pain and 10# coreball  Seated Tloop ankle PF red 2x20 L/R  Toe walk side step along counter 15 feet x 4 L/R    Neuromuscular Re-Ed:  Foam SLS L/R 2x30\" each  Small rocker board AP rocks/balance- DL 30 sec each , SL 30 sec each  Step tap 9 inches foam fw and lat 2 x 15 each    DNP:  12\" hurdles no UE support fwd/lateral x 4 each  Foam balance beam tandem fw walks  Walking with head turns L/R, up/down  RB AP " "rocking/balance, lateral balance EO x 30\" each  Mod SLS L/R    HEP/Access Codes:  ADD ALL EXERCISES   Access Code: [email or web address] Date: 10/25/2024   - Side Stepping with Resistance at Ankles  - 1 x daily - 5 x weekly - 2 sets - 10 reps  - Long Sitting Ankle Dorsiflexion with Anchored Resistance  - 1 x daily - 5 x weekly - 2 sets - 10 reps  - Ankle Eversion with Resistance  - 1 x daily - 5 x weekly - 2 sets - 10 reps  - Ankle Inversion with Resistance  - 1 x daily - 5 x weekly - 2 sets - 10 reps  - Ankle and Toe Plantarflexion with Resistance  - 1 x daily - 5 x weekly - 2 sets - 10 reps  - Standing Terminal Knee Extension with Resistance  - 1 x daily - 5 x weekly - 2 sets - 15 reps  - Step Up  - 1 x daily - 5 x weekly - 2 sets - 15 reps  - Forward Step Down with Heel Tap and Counter Support  - 1 x daily - 5 x weekly - 2 sets - 15 reps  - Step Taps on High Step  - 1 x daily - 5 x weekly - 1 sets - 40 reps  - quad set and straight leg combo  - 1 x daily - 5 x weekly - 2 sets - 15 reps  - Single Leg Balance on Foam Pad  - 1-2 x daily - 6 x weekly - 1 sets - 5 reps - 30 sec hold    Assessment:    Patient tolerated increased resistance with most exercises today, no complaints of additional L knee soreness. Patient challenged with SLS on compliant surfaces, moderated LOB during. Patient was feeling less sore in L knee by end of session.     Post-Treatment Symptoms:   Response to Interventions: Relief     Plan:    continue to strengthen and work on alignment/compensation issues    Goals:   Active       PT Problem       PT Goal 1       Start:  09/06/24    Expected End:  12/02/24       1. Pt will improve ABC score to 95% confidence  2. Pt will be able to peform tandem stance with eyes closed x 20+ seconds  3. Pt will prevent falls  4. Pt will improve B LE strength and ROM to WNL  5. Pt will be independent with HEP.            "

## 2024-12-09 ENCOUNTER — APPOINTMENT (OUTPATIENT)
Dept: PHYSICAL THERAPY | Facility: CLINIC | Age: 74
End: 2024-12-09
Payer: COMMERCIAL

## 2024-12-13 ENCOUNTER — TREATMENT (OUTPATIENT)
Dept: PHYSICAL THERAPY | Facility: CLINIC | Age: 74
End: 2024-12-13
Payer: COMMERCIAL

## 2024-12-13 DIAGNOSIS — M25.572 CHRONIC PAIN OF LEFT ANKLE: ICD-10-CM

## 2024-12-13 DIAGNOSIS — M17.12 PRIMARY OSTEOARTHRITIS OF LEFT KNEE: ICD-10-CM

## 2024-12-13 DIAGNOSIS — R26.81 GAIT INSTABILITY: ICD-10-CM

## 2024-12-13 DIAGNOSIS — G89.29 CHRONIC PAIN OF LEFT ANKLE: ICD-10-CM

## 2024-12-13 DIAGNOSIS — M21.079 VALGUS DEFORMITY, NOT ELSEWHERE CLASSIFIED, UNSPECIFIED ANKLE: ICD-10-CM

## 2024-12-13 DIAGNOSIS — M21.42 PES PLANUS OF LEFT FOOT: ICD-10-CM

## 2024-12-13 DIAGNOSIS — M25.551 RIGHT HIP PAIN: ICD-10-CM

## 2024-12-13 PROCEDURE — 97110 THERAPEUTIC EXERCISES: CPT | Mod: GP

## 2024-12-13 PROCEDURE — 97112 NEUROMUSCULAR REEDUCATION: CPT | Mod: GP

## 2024-12-13 ASSESSMENT — PAIN - FUNCTIONAL ASSESSMENT: PAIN_FUNCTIONAL_ASSESSMENT: 0-10

## 2024-12-13 ASSESSMENT — PAIN SCALES - GENERAL: PAINLEVEL_OUTOF10: 3

## 2024-12-13 NOTE — PROGRESS NOTES
Urogynecology  Provider:  Karla Terry MD  [phone]              ASSESSMENT AND PLAN:   74-year-old female being assessed for vaginal atrophy, HTN, uterovaginal prolapse (resolved), HAYLIE (resolved), OAB, and LS.    Diagnoses:  #1 Vaginal atrophy  #2 Hypertension  #3 Uterovaginal prolapse (resolved)  #4 Stress incontinence (resolved)  #5 Overactive bladder  #6 Routine health maintenance    Plan:  Vaginal atrophy  - Exam revealed a moderately Hypoestrogenic state.    2. HTN  - Blood pressure readings were noted to be slightly elevated.  - She is advised to FU with her PCP, but it is not urgent.    3. Uterovaginal prolapse (resolved), HAYLIE (resolved)  - 3/15/2019 procedure: Robotic-associated BILL, robotic-associated SCPXY, midurethral sling, posterior colporrhaphy, perineorrhaphy, and cystoscopy.   - No evidence of POP or HAYLIE by clinical exam and subjectively per the patient.   - No mesh erosion and normal cervix, normal rectal exam.    4. OAB  - Rx refill Trospium 20 mg sent to patient's preferred pharmacy.  - PVR = 11 mL by U/S.    5. Routine health maintenance  - Rx for Crestor 10 mg tablet sent to patient's preferred pharmacy.  - Rx for Synthroid, Levoxyl 75 mcg tablet sent to patient's preferred pharmacy.    Follow-up with Dr. Terry in one year.    Scribe Attestation  By signing my name below, INoble Scribe, attest that this documentation has been prepared under the direction and in the presence of Karla Terry MD on 12/16/2024 at 10:17 AM.    Agree with above. I Dr. Terry, personally performed the services described in the documentation which was scribed virtually and confirm it is both complete and accurate.  Karla Terry MD      Problem List Items Addressed This Visit    None          I spent a total of eConsult Time: 25 minutes in face to face and non face to face time.        Karla Terry MD        HISTORY OF PRESENT ILLNESS:     Last visit 12/2023  73 y.o. old  female being assessed for HAYLIE, uterovaginal prolapse, OAB and lichen sclerosis      Diagnosis:  #1 HAYLIE  #2 Uterovaginal prolapse   #3 Lichen sclerosis   #4 OAB     1. Uterovaginal prolapse, HAYLIE  - 3/15/2019 procedure: Robotic-associated BILL, robotic-associated SCPXY, midurethral sling, posterior colporrhaphy, perineorrhaphy, and cystoscopy.   - No evidence of POP or HAYLIE by clinical exam and subjectively per the patient.      2. Lichen sclerosis  - Refilled Lotrisone cream to be used PRN      3. OAB  - Rx'd Trospium 20MG PO BID or PRN for travelling         Interval History:  - She reports a recent episode of diverticulitis that required an ER visit.  - She was unaware of the condition during the last colonoscopy.  - She is currently being managed by her PCP.  - She mentions BP readings have been a bit high recently.    Social History:  - She is doing well overall.  - She is excited about becoming a grandmother in May.         Past Medical History:      Past Medical History:   Diagnosis Date    Arthritis     Cataract     Disease of thyroid gland     Encounter for fitting and adjustment of other specified devices 07/22/2018    Pessary maintenance    Encounter for general adult medical examination without abnormal findings 06/14/2021    Preventative health care    Hypertension     Other abnormal glucose 12/16/2016    Elevated glucose    Other specified complication of genitourinary prosthetic devices, implants and grafts, initial encounter (CMS-HCC) 07/22/2018    Vaginal erosion secondary to pessary use, initial encounter    Pain in right foot 11/06/2017    Foot pain, bilateral    Pain in unspecified foot 06/30/2021    Foot pain    Personal history of other diseases of the circulatory system 11/18/2016    History of abnormal electrocardiography    Personal history of other diseases of the female genital tract 06/04/2020    History of uterine prolapse    Personal history of other diseases of the female genital tract  2016    History of postmenopausal bleeding    Personal history of other diseases of the nervous system and sense organs 2017    History of bacterial conjunctivitis    Personal history of other diseases of the respiratory system 2021    History of sore throat    Personal history of other diseases of urinary system 2018    History of hematuria    Personal history of other endocrine, nutritional and metabolic disease     History of hypothyroidism    Personal history of other specified conditions 2019    History of dysuria    Personal history of urinary (tract) infections 2019    History of urinary tract infection    Postmenopausal atrophic vaginitis 2021    Vaginal atrophy    Urge incontinence 2019    Urge incontinence          Past Surgical History:       Past Surgical History:   Procedure Laterality Date    CATARACT EXTRACTION  2016    Cataract Surgery     SECTION, LOW TRANSVERSE      HAND TENDON SURGERY  2016    Hand Incision Tendon Sheath Of A Finger    HYSTERECTOMY      JOINT REPLACEMENT      OTHER SURGICAL HISTORY  2016    Prophylaxis Of Retinal Detachment    OTHER SURGICAL HISTORY  2016    Condylotomy Of TMJ    OTHER SURGICAL HISTORY  2016    Cervical Surgery (Gyn)    OTHER SURGICAL HISTORY  2021    Hysterectomy    TOTAL KNEE ARTHROPLASTY  2016    Total Knee Arthroplasty    WISDOM TOOTH EXTRACTION           Medications:       Prior to Admission medications    Medication Sig Start Date End Date Taking? Authorizing Provider   amoxicillin (Amoxil) 500 mg capsule Take 4 capsules (2,000 mg) by mouth 1 hour before appointment 24      amoxicillin (Amoxil) 500 mg capsule Take 4 capsules by mouth 1 hour prior to appointment 10/28/24      chlorthalidone (Hygroton) 50 mg tablet Take 1 tablet (50 mg) by mouth once daily. 24   Kathryn Dunham MD   cholecalciferol (Vitamin D3) 50 MCG (2000) tablet Take 1 tablet  (50 mcg) by mouth once daily.    Historical Provider, MD   choline fenofibrate (Trilipix) 45 mg DR capsule Take 1 capsule (45 mg) by mouth once daily. 7/22/24   Kathryn Dunham MD   ferrous gluconate (Fergon) 324 (38 Fe) mg tablet Take 1 tablet (38 mg of iron) by mouth 3 (three) times a week.    Historical Provider, MD   levothyroxine (Synthroid, Levoxyl) 75 mcg tablet Take 1 tablet (75 mcg) by mouth once daily in the morning. 7/22/24   Kathryn Dunham MD   magnesium 30 mg tablet Take 1 tablet (30 mg) by mouth 2 times a day.    Historical Provider, MD   potassium chloride CR (Klor-Con M20) 20 mEq ER tablet Take 1 tablet (20 mEq) by mouth once daily. Do not crush or chew. 7/22/24   Kathryn Dunham MD   rOPINIRole (Requip) 3 mg tablet Take 1 tablet (3 mg) by mouth 2 times a day. 7/22/24   Kathryn Dunham MD   rosuvastatin (Crestor) 10 mg tablet Take 1 tablet (10 mg) by mouth once daily. 7/22/24   Kathryn Dunham MD   traZODone (Desyrel) 150 mg tablet Take 1 tablet (150 mg) by mouth once daily at bedtime. 7/22/24   Kathryn Dunham MD   trospium (Sanctura) 20 mg tablet Take 1/2 to 1 tablet by mouth twice a day as needed for overactive bladder 7/22/24   Kathryn Dunham MD        ROS  Review of Systems   Constitutional: Negative.    HENT: Negative.     Eyes: Negative.    Respiratory: Negative.     Cardiovascular: Negative.    Gastrointestinal: Negative.    Endocrine: Negative.    Genitourinary: Negative.    Musculoskeletal: Negative.    Neurological: Negative.    Psychiatric/Behavioral: Negative.          Blood, Urine   Date Value Ref Range Status   05/04/2024 NEGATIVE NEGATIVE Final     Poc Nitrite, Urine   Date Value Ref Range Status   12/11/2023 NEGATIVE NEGATIVE Final     Nitrite, Urine   Date Value Ref Range Status   05/04/2024 NEGATIVE NEGATIVE Final     Urobilinogen, Urine   Date Value Ref Range Status   05/04/2024 Normal Normal mg/dL Final         PHYSICAL EXAM:      There were no vitals taken for this visit.      No LMP recorded. Patient has had a hysterectomy.      Declines chaperone for physical exam.      Well developed, well nourished, in no apparent distress.   Neurologic/Psychiatric:  Awake, Alert and Oriented times 3.  Affect normal.     GENITAL/URINARY:       External Genitalia:  The patient has normal appearing external genitalia, normal skenes and bartholins glands, and a normal hair distribution.  Her vulva is without lesions, erythema or discharge.  It is non-tender with appropriate sensation.     Urethral Meatus:  Size normal, Location normal, Lesions absent, Prolapse absent,      Urethra:  Fullness absent, Masses absent,      Bladder:  Fullness absent, Masses absent, Tenderness absent,      Vagina:  General appearance normal, Estrogen effect normal, Discharge absent, Lesions absent     Cervix: Normal, no discharge.   Uterus:  surgically absent  Adnexa: normal     Anus/Perineum:  Lesions absent and Masses absent normal sphincter tone, no lesions  No Hemorrhoids, Normal Perineum      POP-Q:  Stage: 0  Position: sitting          Data and DIAGNOSTIC STUDIES REVIEWED   No results found.   Lab Results   Component Value Date    URINECULTURE No significant growth 05/04/2024    URINECULTURE No significant growth 05/04/2024      Lab Results   Component Value Date    GLUCOSE 109 (H) 07/27/2024    CALCIUM 9.8 07/27/2024     07/27/2024    K 3.7 07/27/2024    CO2 28 07/27/2024     07/27/2024    BUN 20 07/27/2024    CREATININE 0.70 07/27/2024     Lab Results   Component Value Date    WBC 4.3 (L) 07/27/2024    HGB 12.0 07/27/2024    HCT 37.8 07/27/2024    MCV 84 07/27/2024     07/27/2024          Karla Terry MD

## 2024-12-13 NOTE — PROGRESS NOTES
"  Physical Therapy Treatment    Patient Name: Amanda Matson  MRN: [id number]  Encounter date: 12/13/2024    Time Calculation  Start Time: 0835  Stop Time: 0920  Time Calculation (min): 45 min  PT Therapeutic Procedures Time Entry  Neuromuscular Re-Education Time Entry: 20  Therapeutic Exercise Time Entry: 20    Visit # 18 of 20 (actual count -1 , due to eval)  Visits/Dates Authorized: EVAL ONLY-Auth Rcvd 20 visits 9/3-12/31 CPTs [id number]     Current Problem:   Problem List Items Addressed This Visit             ICD-10-CM    Gait instability R26.81    Left ankle pain M25.572    Pes planus of left foot M21.42    Primary osteoarthritis of left knee M17.12    Right hip pain M25.551    Valgus deformity, not elsewhere classified, unspecified ankle M21.079     Precautions:    N/A       Subjective   General:   General Comment: Expresses continued compliance with HEP including use of RB now. Liking support of shoes/inserts. Getting cortisone injection of knee, gel injection has not helped. Always wearing knee brace.       Pre-Treatment Symptoms:   Pain Assessment: 0-10  0-10 (Numeric) Pain Score: 3 (6/10 after working all day)  Pain Location: Knee    Objective   Findings:    Severe valgus collapse L foot, pt with rearfoot eversion, collapsed arch, forefoot varus, drop of metatarsal arch      Treatments:      Therapeutic Exercise:   Nu Step L 5 x 6 min , seat 8 arms 9  Resisted Gait 4-way f/b 15# , side 12.5# x3   Toe walk side step along counter 15 feet x 4 L/R    DNP:  Shuttle DL # 2x15  Shuttle SL 50# 1x15 R/L   Heel raise in standing 2x12  Toe raise against wall 2x12  Sit to stand 2x12 from elevated seat height to reduce knee pain and 10# coreball  Seated Tloop ankle PF red 2x20 L/R      Neuromuscular Re-Ed:  Foam SLS L/R 2x30\" each  Foam balance beam tandem fw walks  Walking with head turns L/R, up/down  RB AP rocking/balance, lateral balance EO x 30\" each  SLS blue oval with target " "taps    DNP:  12\" hurdles no UE support fwd/lateral x 4 each  Mod SLS L/R  Small rocker board AP rocks/balance- DL 30 sec each , SL 30 sec each  Step tap 9 inches foam fw and lat 2 x 15 each    HEP/Access Codes:  Access Code: [email or web address] Date: 10/25/2024   - Side Stepping with Resistance at Ankles  - 1 x daily - 5 x weekly - 2 sets - 10 reps  - Long Sitting Ankle Dorsiflexion with Anchored Resistance  - 1 x daily - 5 x weekly - 2 sets - 10 reps  - Ankle Eversion with Resistance  - 1 x daily - 5 x weekly - 2 sets - 10 reps  - Ankle Inversion with Resistance  - 1 x daily - 5 x weekly - 2 sets - 10 reps  - Ankle and Toe Plantarflexion with Resistance  - 1 x daily - 5 x weekly - 2 sets - 10 reps  - Standing Terminal Knee Extension with Resistance  - 1 x daily - 5 x weekly - 2 sets - 15 reps  - Step Up  - 1 x daily - 5 x weekly - 2 sets - 15 reps  - Forward Step Down with Heel Tap and Counter Support  - 1 x daily - 5 x weekly - 2 sets - 15 reps  - Step Taps on High Step  - 1 x daily - 5 x weekly - 1 sets - 40 reps  - quad set and straight leg combo  - 1 x daily - 5 x weekly - 2 sets - 15 reps  - Single Leg Balance on Foam Pad  - 1-2 x daily - 6 x weekly - 1 sets - 5 reps - 30 sec hold  Access Code: [email or web address] Date: 12/13/2024 Added:   - Standing Terminal Knee Extension with Resistance  - 1 x daily - 7 x weekly - 3 sets - 10-30 reps - 0-5 hold  - Side Stepping on Toes at Counter  - 1 x daily - 7 x weekly - 3 sets - 20-60 seconds      Assessment:   PT Assessment  Assessment Comment: Would benefit from arch support and additional forefoot varus correction or [employer] Shoes with additonal forefoot posting on insole.    Post-Treatment Symptoms:   Response to Interventions: No change in pain     Plan:    continue to strengthen and work on alignment/compensation issues    Goals:   Active       PT Problem       PT Goal 1       Start:  09/06/24    Expected End:  12/02/24       1. Pt will improve ABC score to 95% confidence  2. Pt " will be able to peform tandem stance with eyes closed x 20+ seconds  3. Pt will prevent falls  4. Pt will improve B LE strength and ROM to WNL  5. Pt will be independent with HEP.

## 2024-12-16 ENCOUNTER — OFFICE VISIT (OUTPATIENT)
Dept: OBSTETRICS AND GYNECOLOGY | Facility: CLINIC | Age: 74
End: 2024-12-16
Payer: COMMERCIAL

## 2024-12-16 VITALS
HEART RATE: 80 BPM | WEIGHT: 149.8 LBS | BODY MASS INDEX: 25.57 KG/M2 | DIASTOLIC BLOOD PRESSURE: 80 MMHG | HEIGHT: 64 IN | SYSTOLIC BLOOD PRESSURE: 148 MMHG

## 2024-12-16 DIAGNOSIS — N32.81 OVERACTIVE BLADDER: ICD-10-CM

## 2024-12-16 DIAGNOSIS — N81.9 VAGINAL VAULT PROLAPSE: Primary | ICD-10-CM

## 2024-12-16 DIAGNOSIS — E78.2 MIXED HYPERLIPIDEMIA: ICD-10-CM

## 2024-12-16 DIAGNOSIS — E03.9 ACQUIRED HYPOTHYROIDISM: ICD-10-CM

## 2024-12-16 LAB
POC APPEARANCE, URINE: CLEAR
POC BILIRUBIN, URINE: NEGATIVE
POC BLOOD, URINE: NEGATIVE
POC COLOR, URINE: ABNORMAL
POC GLUCOSE, URINE: NEGATIVE MG/DL
POC KETONES, URINE: NEGATIVE MG/DL
POC LEUKOCYTES, URINE: ABNORMAL
POC NITRITE,URINE: NEGATIVE
POC PH, URINE: 7.5 PH
POC PROTEIN, URINE: NEGATIVE MG/DL
POC SPECIFIC GRAVITY, URINE: 1.02
POC UROBILINOGEN, URINE: 0.2 EU/DL

## 2024-12-16 PROCEDURE — 1036F TOBACCO NON-USER: CPT | Performed by: OBSTETRICS & GYNECOLOGY

## 2024-12-16 PROCEDURE — 51798 US URINE CAPACITY MEASURE: CPT | Performed by: OBSTETRICS & GYNECOLOGY

## 2024-12-16 PROCEDURE — 1159F MED LIST DOCD IN RCRD: CPT | Performed by: OBSTETRICS & GYNECOLOGY

## 2024-12-16 PROCEDURE — 3077F SYST BP >= 140 MM HG: CPT | Performed by: OBSTETRICS & GYNECOLOGY

## 2024-12-16 PROCEDURE — 99213 OFFICE O/P EST LOW 20 MIN: CPT | Performed by: OBSTETRICS & GYNECOLOGY

## 2024-12-16 PROCEDURE — 99213 OFFICE O/P EST LOW 20 MIN: CPT | Mod: 25 | Performed by: OBSTETRICS & GYNECOLOGY

## 2024-12-16 PROCEDURE — 1123F ACP DISCUSS/DSCN MKR DOCD: CPT | Performed by: OBSTETRICS & GYNECOLOGY

## 2024-12-16 PROCEDURE — 3079F DIAST BP 80-89 MM HG: CPT | Performed by: OBSTETRICS & GYNECOLOGY

## 2024-12-16 PROCEDURE — 3008F BODY MASS INDEX DOCD: CPT | Performed by: OBSTETRICS & GYNECOLOGY

## 2024-12-16 PROCEDURE — 81003 URINALYSIS AUTO W/O SCOPE: CPT | Mod: QW | Performed by: OBSTETRICS & GYNECOLOGY

## 2024-12-16 RX ORDER — TROSPIUM CHLORIDE 20 MG/1
20 TABLET, FILM COATED ORAL 2 TIMES DAILY
Qty: 180 TABLET | Refills: 2 | Status: SHIPPED | OUTPATIENT
Start: 2024-12-16

## 2024-12-16 RX ORDER — ROSUVASTATIN CALCIUM 10 MG/1
10 TABLET, COATED ORAL DAILY
Qty: 90 TABLET | Refills: 2 | Status: SHIPPED | OUTPATIENT
Start: 2024-12-16

## 2024-12-16 RX ORDER — LEVOTHYROXINE SODIUM 75 UG/1
75 TABLET ORAL EVERY MORNING
Qty: 90 TABLET | Refills: 2 | Status: SHIPPED | OUTPATIENT
Start: 2024-12-16

## 2024-12-16 ASSESSMENT — ENCOUNTER SYMPTOMS
ENDOCRINE NEGATIVE: 1
CARDIOVASCULAR NEGATIVE: 1
RESPIRATORY NEGATIVE: 1
PSYCHIATRIC NEGATIVE: 1
MUSCULOSKELETAL NEGATIVE: 1
GASTROINTESTINAL NEGATIVE: 1
CONSTITUTIONAL NEGATIVE: 1
EYES NEGATIVE: 1
NEUROLOGICAL NEGATIVE: 1

## 2024-12-17 ENCOUNTER — APPOINTMENT (OUTPATIENT)
Dept: PHYSICAL THERAPY | Facility: CLINIC | Age: 74
End: 2024-12-17
Payer: COMMERCIAL

## 2024-12-17 ENCOUNTER — APPOINTMENT (OUTPATIENT)
Dept: ORTHOPEDIC SURGERY | Facility: CLINIC | Age: 74
End: 2024-12-17
Payer: COMMERCIAL

## 2024-12-17 DIAGNOSIS — M17.12 PRIMARY OSTEOARTHRITIS OF LEFT KNEE: Primary | ICD-10-CM

## 2024-12-17 PROCEDURE — 1123F ACP DISCUSS/DSCN MKR DOCD: CPT | Performed by: FAMILY MEDICINE

## 2024-12-17 PROCEDURE — 1036F TOBACCO NON-USER: CPT | Performed by: FAMILY MEDICINE

## 2024-12-17 PROCEDURE — 99213 OFFICE O/P EST LOW 20 MIN: CPT | Performed by: FAMILY MEDICINE

## 2024-12-17 PROCEDURE — 20610 DRAIN/INJ JOINT/BURSA W/O US: CPT | Performed by: FAMILY MEDICINE

## 2024-12-17 PROCEDURE — 1159F MED LIST DOCD IN RCRD: CPT | Performed by: FAMILY MEDICINE

## 2024-12-17 RX ORDER — LIDOCAINE HYDROCHLORIDE 10 MG/ML
4 INJECTION, SOLUTION INFILTRATION; PERINEURAL
Status: COMPLETED | OUTPATIENT
Start: 2024-12-17 | End: 2024-12-17

## 2024-12-17 RX ORDER — TRIAMCINOLONE ACETONIDE 40 MG/ML
40 INJECTION, SUSPENSION INTRA-ARTICULAR; INTRAMUSCULAR
Status: COMPLETED | OUTPATIENT
Start: 2024-12-17 | End: 2024-12-17

## 2024-12-17 NOTE — PROGRESS NOTES
** Please excuse any errors in grammar or translation related to this dictation. Voice recognition software was utilized to prepare this document. **    Assessment & Plan:   Patient here for ongoing management of left knee arthritis.   Non-operative treatment options reviewed below.  - Exercise program to strengthen supportive musculature.    - Prescription and otc analgesics to include but not limited to APAP, ibuprofen, naproxen, diclofenac gel.  - Steroid injection.  Elected to have completed today.  - Hyaluronic acid injection.  Durolane injection ineffective completed October 2024.  -Recommend repeating x-rays of her left knee prior to her next visit to assess for disease progression.  -We did discuss referral to joint replacement surgeon but she has not yet made to proceed.  Follow-up as needed for ongoing management. All questions answered and patient is agreeable to this plan.       Chief complaint:  Left knee pain    HPI:  12/17/24: Patient following up for left knee pain secondary to arthritis.  Patient reports the Durolane injection from 10/1 did not provide any relief.  Denies any new injuries.  She stays active performing HIIT exercises several times a week.  She continues to work part-time.    10/1/24: Patient following up for left knee pain.  Reports previous steroid injection helped for around 5 months.  Pain has been progressively increasing over the past 4 weeks.  Pain is most problematic when she is ambulatory.  Pain tends to localize the medial aspect of her knee.    4/25/24: Patient states she had about 4 months of relief with CSI. She is still having some relief post injection, but is leaving for a trip to [facility] and requests repeat injection to prevent pain while on vacation.    12/12/23: 74 y/o female patient, hx of right knee total arthroplasty in 2010, presents with left knee pain.  This complaint has been ongoing for a year.  No mechanism of injury reported at onset. Symptoms have  progressively worsened with time.  Pain is most prominent at medial knee.  Swelling present intermittently. It is associated with sensation of stiffness, loss of joint motion, crackling/popping sensation.   Symptoms are aggravated by sitting to standing, changing positions, knee flexion,. To date, patient has tried a variety of treatments to include Tylenol, tens unit, cortisone injection. Previously saw Shalom Stahl PA-C for this with last visit being 23. Last steroid injection completed 23. She wanted to pursue having a viscosupplement injection opposed to steroid and was referred here for this.     Patient Active Problem List   Diagnosis    Absolute anemia    Acquired abduction deformity of forefoot    Acquired claw toe    Agatston CAC score, <100    Arthritis, midfoot    Chronic insomnia    Decreased pedal pulses    Gait instability    Hallux valgus with bunions of right foot    Hyperlipidemia    Hypertension    Hypoestrogenism    Hypothyroidism    Left ankle pain    Moderate single current episode of major depressive disorder (Multi)    Pes planus of left foot    Primary osteoarthritis of left knee    Prolapse, uterovaginal    Right hip pain    Urge and stress incontinence    Vaginal erosion secondary to pessary use (CMS-[facility])    Valgus deformity, not elsewhere classified, unspecified ankle     Past Surgical History:   Procedure Laterality Date    BUNIONECTOMY      CARPAL TUNNEL RELEASE      CATARACT EXTRACTION  2016    Cataract Surgery     SECTION, LOW TRANSVERSE      HAND TENDON SURGERY  2016    Hand Incision Tendon Sheath Of A Finger    HYSTERECTOMY      JOINT REPLACEMENT      OTHER SURGICAL HISTORY  2016    Prophylaxis Of Retinal Detachment    OTHER SURGICAL HISTORY  2016    Condylotomy Of TMJ    OTHER SURGICAL HISTORY  2016    Cervical Surgery (Gyn)    OTHER SURGICAL HISTORY  2021    Hysterectomy    TOE SURGERY      TOTAL KNEE ARTHROPLASTY  2016     Total Knee Arthroplasty    WISDOM TOOTH EXTRACTION  1978     Current Outpatient Medications on File Prior to Visit   Medication Sig Dispense Refill    amoxicillin (Amoxil) 500 mg capsule Take 4 capsules (2,000 mg) by mouth 1 hour before appointment 12 capsule 1    amoxicillin (Amoxil) 500 mg capsule Take 4 capsules by mouth 1 hour prior to appointment 24 capsule 1    chlorthalidone (Hygroton) 50 mg tablet Take 1 tablet (50 mg) by mouth once daily. 90 tablet 1    cholecalciferol (Vitamin D3) 50 MCG (2000 UT) tablet Take 1 tablet (50 mcg) by mouth once daily.      choline fenofibrate (Trilipix) 45 mg DR capsule Take 1 capsule (45 mg) by mouth once daily. 90 capsule 1    ferrous gluconate (Fergon) 324 (38 Fe) mg tablet Take 1 tablet (38 mg of iron) by mouth 3 (three) times a week.      levothyroxine (Synthroid, Levoxyl) 75 mcg tablet Take 1 tablet (75 mcg) by mouth once daily in the morning. 90 tablet 2    magnesium 30 mg tablet Take 1 tablet (30 mg) by mouth 2 times a day.      potassium chloride CR (Klor-Con M20) 20 mEq ER tablet Take 1 tablet (20 mEq) by mouth once daily. Do not crush or chew. 90 tablet 1    rOPINIRole (Requip) 3 mg tablet Take 1 tablet (3 mg) by mouth 2 times a day. 180 tablet 1    rosuvastatin (Crestor) 10 mg tablet Take 1 tablet (10 mg) by mouth once daily. 90 tablet 2    traZODone (Desyrel) 150 mg tablet Take 1 tablet (150 mg) by mouth once daily at bedtime. 90 tablet 1    trospium (Sanctura) 20 mg tablet Take 1/2 to 1 tablet by mouth twice a day as needed for overactive bladder 180 tablet 2    [DISCONTINUED] levothyroxine (Synthroid, Levoxyl) 75 mcg tablet Take 1 tablet (75 mcg) by mouth once daily in the morning. 90 tablet 1    [DISCONTINUED] rosuvastatin (Crestor) 10 mg tablet Take 1 tablet (10 mg) by mouth once daily. 90 tablet 1    [DISCONTINUED] trospium (Sanctura) 20 mg tablet Take 1/2 to 1 tablet by mouth twice a day as needed for overactive bladder 180 tablet 1     No current  facility-administered medications on file prior to visit.       Exam:  LEFT Knee examined. No effusion, ecchymosis, or erythema.  AROM from 5 to 120 deg with 5/5 strength. SILT overlying knee. Motion crepitus present. No joint line ttp.  No popliteal mass palpated. Negative anterior and posterior drawer.  No laxity to varus or valgus stress at 0 or 30 deg.  No patellar apprehension.      General Exam:  Constitutional - NAD, AAO x 3, conversing appropriately.  HEENT- Normocephalic and atraumatic. No facial deformities. Hearing grossly normal.  Lungs - Breathing non-labored with normal rate. No accessory muscle use.  CV - Extremities warm and well-perfused, brisk capillary refill present.   Neuro - CN II-XII grossly intact.      Results:  X-rays of left knee obtained September 2022: mild arthritic changes.  Lab Results   Component Value Date    HGBA1C 5.6 07/27/2024    CREATININE 0.70 07/27/2024    EGFR >90 07/27/2024     Procedure:  Patient ID: Amanda Matson is a 74 y.o. female.    L Inj/Asp: L knee on 12/17/2024 8:49 AM  Indications: pain  Details: 25 G needle, anterolateral approach  Medications: 40 mg triamcinolone acetonide 40 mg/mL; 4 mL lidocaine 10 mg/mL (1 %)  Outcome: tolerated well, no immediate complications    Procedure risk factors to include increased pain, bleeding, infection, neurovascular injury, soft tissue injury, progressive cartilage loss, transient elevation of blood glucose and blood pressure, and adverse reaction to medication were discussed with the patient. Patient understands there is a moderate risk of morbidity from undergoing the procedure.    Procedure, treatment alternatives, risks and benefits explained, specific risks discussed. Consent was given by the patient. Immediately prior to procedure a time out was called to verify the correct patient, procedure, equipment, support staff and site/side marked as required. Patient was prepped and draped in the usual sterile fashion.

## 2024-12-18 ENCOUNTER — PHARMACY VISIT (OUTPATIENT)
Dept: PHARMACY | Facility: CLINIC | Age: 74
End: 2024-12-18
Payer: COMMERCIAL

## 2024-12-18 PROCEDURE — RXMED WILLOW AMBULATORY MEDICATION CHARGE

## 2024-12-19 ENCOUNTER — PHARMACY VISIT (OUTPATIENT)
Dept: PHARMACY | Facility: CLINIC | Age: 74
End: 2024-12-19
Payer: COMMERCIAL

## 2024-12-19 ENCOUNTER — HOSPITAL ENCOUNTER (OUTPATIENT)
Dept: RADIOLOGY | Facility: HOSPITAL | Age: 74
Discharge: HOME | End: 2024-12-19
Payer: COMMERCIAL

## 2024-12-19 DIAGNOSIS — M17.12 PRIMARY OSTEOARTHRITIS OF LEFT KNEE: ICD-10-CM

## 2024-12-19 PROCEDURE — 73564 X-RAY EXAM KNEE 4 OR MORE: CPT | Mod: LEFT SIDE | Performed by: RADIOLOGY

## 2024-12-19 PROCEDURE — 73564 X-RAY EXAM KNEE 4 OR MORE: CPT | Mod: LT

## 2024-12-20 NOTE — PROGRESS NOTES
Physical Therapy Treatment    Patient Name: Amanda Matson  MRN: [id number]  Encounter date: 12/23/2024    Time Calculation  Start Time: 1015  Stop Time: 1100  Time Calculation (min): 45 min  PT Therapeutic Procedures Time Entry  Neuromuscular Re-Education Time Entry: 15  Therapeutic Exercise Time Entry: 30    Visit # 19 of 20 (actual count -1 , due to eval)  Visits/Dates Authorized: EVAL ONLY-Auth Rcvd 20 visits 9/3-12/31 CPTs [id number] [id number]     Current Problem:   Problem List Items Addressed This Visit             ICD-10-CM    Gait instability R26.81    Left ankle pain M25.572    Pes planus of left foot M21.42    Primary osteoarthritis of left knee M17.12    Right hip pain M25.551    Valgus deformity, not elsewhere classified, unspecified ankle M21.079       Precautions:    N/A       Subjective   General:   General Comment: pt reports good tolerance to last session and only intermittent compliance with HEP secondary to holiday season. pt denies any falls recently.  pt reports she is leaving for [place] and won't be back until Jan 5 2025.  pt reports she did recieve a cortisone injection in L knee and feels that it is helping.       Pre-Treatment Symptoms:   Pain Assessment: 0-10  0-10 (Numeric) Pain Score: 0 - No pain  Pain Location: Knee    Objective   Findings:    Severe valgus collapse L foot, pt with rearfoot eversion, collapsed arch, forefoot varus, drop of metatarsal arch.  Mild palpable L ok-patellar crepitus (non-painful) with ckc squat.        Treatments:      Therapeutic Exercise:   Nu Step L 5 x 6 min , seat 8 arms 9  TBSS orange x 3 laps  TB forward walks (monster) orange x 2 laps  Shuttle DL 82.5-100# 2x10  Shuttle SL 50# 1x15 R/L   Sit to stand 2x12 from LMT to reduce knee pain and 10# coreball  Heel raise in standing 2x10  Toe raise against wall 2x10      DNP:  Resisted Gait 4-way f/b 15# , side 12.5# x3   Toe walk side step along counter 15 feet x 4 L/R  Seated Tloop ankle PF red 2x20  "L/R      Neuromuscular Re-Ed:  Walking with head turns L/R, up/down  Foam balance beam tandem fw and backward walks  Tandem stance on blue beam R and L x 30\" each  Blue sabi pad Foam SLS L/R 3x20\" each  SLS blue oval with target taps ( 3 way taps)      DNP:  12\" hurdles no UE support fwd/lateral x 4 each  Mod SLS L/R  Small rocker board AP rocks/balance- DL 30 sec each , SL 30 sec each  Step tap 9 inches foam fw and lat 2 x 15 each  RB AP rocking/balance, lateral balance EO x 30\" each  HEP/Access Codes:  Access Code: [email or web address] Date: 10/25/2024   - Side Stepping with Resistance at Ankles  - 1 x daily - 5 x weekly - 2 sets - 10 reps  - Long Sitting Ankle Dorsiflexion with Anchored Resistance  - 1 x daily - 5 x weekly - 2 sets - 10 reps  - Ankle Eversion with Resistance  - 1 x daily - 5 x weekly - 2 sets - 10 reps  - Ankle Inversion with Resistance  - 1 x daily - 5 x weekly - 2 sets - 10 reps  - Ankle and Toe Plantarflexion with Resistance  - 1 x daily - 5 x weekly - 2 sets - 10 reps  - Standing Terminal Knee Extension with Resistance  - 1 x daily - 5 x weekly - 2 sets - 15 reps  - Step Up  - 1 x daily - 5 x weekly - 2 sets - 15 reps  - Forward Step Down with Heel Tap and Counter Support  - 1 x daily - 5 x weekly - 2 sets - 15 reps  - Step Taps on High Step  - 1 x daily - 5 x weekly - 1 sets - 40 reps  - quad set and straight leg combo  - 1 x daily - 5 x weekly - 2 sets - 15 reps  - Single Leg Balance on Foam Pad  - 1-2 x daily - 6 x weekly - 1 sets - 5 reps - 30 sec hold  Access Code: [email or web address] Date: 12/13/2024 Added:   - Standing Terminal Knee Extension with Resistance  - 1 x daily - 7 x weekly - 3 sets - 10-30 reps - 0-5 hold  - Side Stepping on Toes at Counter  - 1 x daily - 7 x weekly - 3 sets - 20-60 seconds      Assessment:    Patient tolerated treatment well. Patient appears to be working hard in clinic and motivated to decrease pain and restore all functional deficits. Verbal and/or tactile cues given for proper " form, and avoidance of substitution patterns with all exercises. All infection control policies followed during this visit. No observed antalgia with exercise performance     Post-Treatment Symptoms:     0/10    Plan:    continue to strengthen and work on alignment/compensation issues    Goals:   Active       PT Problem       PT Goal 1       Start:  09/06/24    Expected End:  12/02/24       1. Pt will improve ABC score to 95% confidence  2. Pt will be able to peform tandem stance with eyes closed x 20+ seconds  3. Pt will prevent falls  4. Pt will improve B LE strength and ROM to WNL  5. Pt will be independent with HEP.

## 2024-12-23 ENCOUNTER — TREATMENT (OUTPATIENT)
Dept: PHYSICAL THERAPY | Facility: CLINIC | Age: 74
End: 2024-12-23
Payer: COMMERCIAL

## 2024-12-23 DIAGNOSIS — M25.551 RIGHT HIP PAIN: ICD-10-CM

## 2024-12-23 DIAGNOSIS — M25.572 CHRONIC PAIN OF LEFT ANKLE: ICD-10-CM

## 2024-12-23 DIAGNOSIS — M21.079 VALGUS DEFORMITY, NOT ELSEWHERE CLASSIFIED, UNSPECIFIED ANKLE: ICD-10-CM

## 2024-12-23 DIAGNOSIS — M21.42 PES PLANUS OF LEFT FOOT: ICD-10-CM

## 2024-12-23 DIAGNOSIS — M17.12 PRIMARY OSTEOARTHRITIS OF LEFT KNEE: ICD-10-CM

## 2024-12-23 DIAGNOSIS — R26.81 GAIT INSTABILITY: ICD-10-CM

## 2024-12-23 DIAGNOSIS — G89.29 CHRONIC PAIN OF LEFT ANKLE: ICD-10-CM

## 2024-12-23 PROCEDURE — 97110 THERAPEUTIC EXERCISES: CPT | Mod: GP,CQ

## 2024-12-23 PROCEDURE — 97112 NEUROMUSCULAR REEDUCATION: CPT | Mod: GP,CQ

## 2024-12-23 ASSESSMENT — PAIN - FUNCTIONAL ASSESSMENT: PAIN_FUNCTIONAL_ASSESSMENT: 0-10

## 2024-12-23 ASSESSMENT — PAIN SCALES - GENERAL: PAINLEVEL_OUTOF10: 0 - NO PAIN

## 2024-12-24 ENCOUNTER — APPOINTMENT (OUTPATIENT)
Dept: PRIMARY CARE | Facility: CLINIC | Age: 74
End: 2024-12-24
Payer: COMMERCIAL

## 2024-12-24 ENCOUNTER — PHARMACY VISIT (OUTPATIENT)
Dept: PHARMACY | Facility: CLINIC | Age: 74
End: 2024-12-24
Payer: COMMERCIAL

## 2024-12-24 VITALS
SYSTOLIC BLOOD PRESSURE: 130 MMHG | BODY MASS INDEX: 24.92 KG/M2 | HEIGHT: 64 IN | DIASTOLIC BLOOD PRESSURE: 70 MMHG | WEIGHT: 146 LBS | HEART RATE: 62 BPM | OXYGEN SATURATION: 98 %

## 2024-12-24 DIAGNOSIS — E03.9 ACQUIRED HYPOTHYROIDISM: ICD-10-CM

## 2024-12-24 DIAGNOSIS — E78.2 MIXED HYPERLIPIDEMIA: ICD-10-CM

## 2024-12-24 DIAGNOSIS — I10 PRIMARY HYPERTENSION: Primary | ICD-10-CM

## 2024-12-24 DIAGNOSIS — F51.04 CHRONIC INSOMNIA: ICD-10-CM

## 2024-12-24 DIAGNOSIS — G25.81 RLS (RESTLESS LEGS SYNDROME): ICD-10-CM

## 2024-12-24 DIAGNOSIS — E87.6 HYPOKALEMIA: ICD-10-CM

## 2024-12-24 DIAGNOSIS — E55.9 VITAMIN D DEFICIENCY: ICD-10-CM

## 2024-12-24 PROCEDURE — 1036F TOBACCO NON-USER: CPT | Performed by: FAMILY MEDICINE

## 2024-12-24 PROCEDURE — 3078F DIAST BP <80 MM HG: CPT | Performed by: FAMILY MEDICINE

## 2024-12-24 PROCEDURE — 3008F BODY MASS INDEX DOCD: CPT | Performed by: FAMILY MEDICINE

## 2024-12-24 PROCEDURE — 3075F SYST BP GE 130 - 139MM HG: CPT | Performed by: FAMILY MEDICINE

## 2024-12-24 PROCEDURE — 1159F MED LIST DOCD IN RCRD: CPT | Performed by: FAMILY MEDICINE

## 2024-12-24 PROCEDURE — 99214 OFFICE O/P EST MOD 30 MIN: CPT | Performed by: FAMILY MEDICINE

## 2024-12-24 PROCEDURE — 1160F RVW MEDS BY RX/DR IN RCRD: CPT | Performed by: FAMILY MEDICINE

## 2024-12-24 PROCEDURE — 1123F ACP DISCUSS/DSCN MKR DOCD: CPT | Performed by: FAMILY MEDICINE

## 2024-12-24 PROCEDURE — RXMED WILLOW AMBULATORY MEDICATION CHARGE

## 2024-12-24 RX ORDER — CHLORTHALIDONE 50 MG/1
50 TABLET ORAL DAILY
Qty: 90 TABLET | Refills: 1 | Status: SHIPPED | OUTPATIENT
Start: 2024-12-24

## 2024-12-24 RX ORDER — ROPINIROLE 3 MG/1
3 TABLET, FILM COATED ORAL 2 TIMES DAILY
Qty: 180 TABLET | Refills: 1 | Status: SHIPPED | OUTPATIENT
Start: 2024-12-24

## 2024-12-24 RX ORDER — POTASSIUM CHLORIDE 20 MEQ/1
20 TABLET, EXTENDED RELEASE ORAL DAILY
Qty: 90 TABLET | Refills: 1 | Status: SHIPPED | OUTPATIENT
Start: 2024-12-24

## 2024-12-24 RX ORDER — TRAZODONE HYDROCHLORIDE 150 MG/1
150 TABLET ORAL NIGHTLY
Qty: 90 TABLET | Refills: 1 | Status: SHIPPED | OUTPATIENT
Start: 2024-12-24

## 2024-12-24 RX ORDER — FENOFIBRIC ACID 45 MG/1
45 CAPSULE, DELAYED RELEASE ORAL DAILY
Qty: 90 CAPSULE | Refills: 1 | Status: SHIPPED | OUTPATIENT
Start: 2024-12-24

## 2024-12-24 ASSESSMENT — ENCOUNTER SYMPTOMS
COUGH: 0
FEVER: 0
SHORTNESS OF BREATH: 0
PALPITATIONS: 0
DIZZINESS: 0
CHILLS: 0
HEADACHES: 0

## 2024-12-24 NOTE — PROGRESS NOTES
"Subjective   Patient ID: Amanda Matson is a 74 y.o. female who presents for Follow-up.    The patient presents today because her blood pressure was elevated when she saw her gynecologist few days ago.  She has been measuring her blood pressures at home and they are all over the place.  Some are in the 110s while some are in the 160s.  She checks them at different times of the day.  She has been taking her medications as prescribed and denies having any side effects.        Review of Systems   Constitutional:  Negative for chills and fever.   Respiratory:  Negative for cough and shortness of breath.    Cardiovascular:  Negative for chest pain and palpitations.   Neurological:  Negative for dizziness and headaches.       Objective   /70   Pulse 62   Ht 1.626 m (5' 4\")   Wt 66.2 kg (146 lb)   SpO2 98%   BMI 25.06 kg/m²   Physical Exam  Constitutional:       Appearance: Normal appearance.   HENT:      Head: Normocephalic and atraumatic.   Pulmonary:      Effort: No respiratory distress.   Musculoskeletal:         General: Normal range of motion.   Skin:     General: Skin is warm and dry.   Neurological:      General: No focal deficit present.      Mental Status: She is alert. Mental status is at baseline.   Psychiatric:         Mood and Affect: Mood normal.         Thought Content: Thought content normal.         Judgment: Judgment normal.           Lab Results   Component Value Date    WBC 4.3 (L) 07/27/2024    HGB 12.0 07/27/2024    HCT 37.8 07/27/2024     07/27/2024    CHOL 185 07/27/2024    TRIG 137 07/27/2024    HDL 59.6 07/27/2024    ALT 20 07/27/2024    AST 27 07/27/2024     07/27/2024    K 3.7 07/27/2024     07/27/2024    CREATININE 0.70 07/27/2024    BUN 20 07/27/2024    CO2 28 07/27/2024    TSH 2.40 07/27/2024    INR 1.0 05/16/2018    HGBA1C 5.6 07/27/2024       XR knee left 4+ views  Narrative: Interpreted By:  Bo Fournier,   STUDY:  Left knee dated  12/19/2024.    "   INDICATION:  Signs/Symptoms:assess for OA progression      COMPARISON:  None.      ACCESSION NUMBER(S):  [id number]      ORDERING CLINICIAN:  AICHA CHOUDHURY      TECHNIQUE:  Four views of the left knee.      FINDINGS:  No fracture or dislocation is evident.  No knee effusion is evident.  There is moderate patellofemoral and lateral femorotibial and severe  medial femorotibial degenerative change. This is progressed since the  previous radiographs, particularly in the medial femorotibial joint  space. No soft tissue gas or radiopaque foreign body is evident.      Impression: Degenerative changes without osseous injury evident.      MACRO:  None      Signed by: Bo Fournier 12/19/2024 3:59 PM  Dictation workstation:   [id number]      Assessment/Plan   Assessment & Plan  Primary hypertension  Continue chlorthalidone  Orders:    chlorthalidone (Hygroton) 50 mg tablet; Take 1 tablet (50 mg) by mouth once daily.    Follow Up In [facility] - PCP - Health Maintenance; Future    CBC; Future    Comprehensive Metabolic Panel; Future    Hemoglobin A1C; Future    Albumin-Creatinine Ratio, Urine Random; Future    Mixed hyperlipidemia  Continue fenofibrate and rosuvastatin  Orders:    choline fenofibrate (Trilipix) 45 mg DR capsule; Take 1 capsule (45 mg) by mouth once daily.    Lipid Panel; Future    Acquired hypothyroidism  Continue levothyroxine  Orders:    TSH with reflex to Free T4 if abnormal; Future    Hypokalemia  Continue potassium supplements  Orders:    potassium chloride CR (Klor-Con M20) 20 mEq ER tablet; Take 1 tablet (20 mEq) by mouth once daily. Do not crush or chew.    RLS (restless legs syndrome)  Continue Requip  Orders:    rOPINIRole (Requip) 3 mg tablet; Take 1 tablet (3 mg) by mouth 2 times a day.    Chronic insomnia  Continue trazodone  Orders:    traZODone (Desyrel) 150 mg tablet; Take 1 tablet (150 mg) by mouth once daily at bedtime.    Vitamin D deficiency    Orders:    Vitamin B12;  Future    Vitamin D 25-Hydroxy,Total (for eval of Vitamin D levels); Future          Your yearly Physical is due in: July 2025  When you call the office for your yearly Physical, please ask them to inform me to order your blood work, so that you can get the fasting blood work before your appointment and we can discuss the results at your physical.      Please call me if any questions arise from now until your next visit. I will call you after I am done seeing patients. A Doctor is always available by phone when the office is closed. Please feel free to call for help with any problem that you feel shouldn't wait until the office re-opens.     Kathryn Dunham MD 12/24/24 10:31 AM

## 2024-12-24 NOTE — ASSESSMENT & PLAN NOTE
Continue fenofibrate and rosuvastatin  Orders:    choline fenofibrate (Trilipix) 45 mg DR capsule; Take 1 capsule (45 mg) by mouth once daily.    Lipid Panel; Future

## 2024-12-24 NOTE — ASSESSMENT & PLAN NOTE
Continue trazodone  Orders:    traZODone (Desyrel) 150 mg tablet; Take 1 tablet (150 mg) by mouth once daily at bedtime.

## 2024-12-24 NOTE — ASSESSMENT & PLAN NOTE
Continue chlorthalidone  Orders:    chlorthalidone (Hygroton) 50 mg tablet; Take 1 tablet (50 mg) by mouth once daily.    Follow Up In [facility] - PCP - Health Maintenance; Future    CBC; Future    Comprehensive Metabolic Panel; Future    Hemoglobin A1C; Future    Albumin-Creatinine Ratio, Urine Random; Future

## 2024-12-26 ENCOUNTER — DOCUMENTATION (OUTPATIENT)
Dept: PHYSICAL THERAPY | Facility: CLINIC | Age: 74
End: 2024-12-26
Payer: COMMERCIAL

## 2024-12-26 NOTE — PROGRESS NOTES
Transfer of care note:  Primary physical therapist role to be transferred to [facility]  Primary therapist is transferring to another facility.

## 2025-01-08 ENCOUNTER — APPOINTMENT (OUTPATIENT)
Dept: RADIOLOGY | Facility: HOSPITAL | Age: 75
End: 2025-01-08
Payer: COMMERCIAL

## 2025-01-09 ENCOUNTER — HOSPITAL ENCOUNTER (OUTPATIENT)
Dept: RADIOLOGY | Facility: HOSPITAL | Age: 75
Discharge: HOME | End: 2025-01-09
Payer: COMMERCIAL

## 2025-01-09 VITALS — HEIGHT: 64 IN | WEIGHT: 146 LBS | BODY MASS INDEX: 24.92 KG/M2

## 2025-01-09 DIAGNOSIS — Z12.31 VISIT FOR SCREENING MAMMOGRAM: ICD-10-CM

## 2025-01-09 PROCEDURE — 77067 SCR MAMMO BI INCL CAD: CPT

## 2025-01-14 ENCOUNTER — TREATMENT (OUTPATIENT)
Dept: PHYSICAL THERAPY | Facility: CLINIC | Age: 75
End: 2025-01-14
Payer: COMMERCIAL

## 2025-01-14 DIAGNOSIS — R26.81 GAIT INSTABILITY: ICD-10-CM

## 2025-01-14 DIAGNOSIS — M25.572 CHRONIC PAIN OF LEFT ANKLE: ICD-10-CM

## 2025-01-14 DIAGNOSIS — G89.29 CHRONIC PAIN OF LEFT ANKLE: ICD-10-CM

## 2025-01-14 DIAGNOSIS — M17.12 PRIMARY OSTEOARTHRITIS OF LEFT KNEE: ICD-10-CM

## 2025-01-14 DIAGNOSIS — M21.079 VALGUS DEFORMITY, NOT ELSEWHERE CLASSIFIED, UNSPECIFIED ANKLE: ICD-10-CM

## 2025-01-14 DIAGNOSIS — M21.42 PES PLANUS OF LEFT FOOT: ICD-10-CM

## 2025-01-14 DIAGNOSIS — M25.551 RIGHT HIP PAIN: ICD-10-CM

## 2025-01-14 PROCEDURE — 97112 NEUROMUSCULAR REEDUCATION: CPT | Mod: GP

## 2025-01-14 PROCEDURE — 97110 THERAPEUTIC EXERCISES: CPT | Mod: GP

## 2025-01-14 ASSESSMENT — PAIN - FUNCTIONAL ASSESSMENT: PAIN_FUNCTIONAL_ASSESSMENT: 0-10

## 2025-01-14 NOTE — PROGRESS NOTES
Physical Therapy Treatment/Progress Report    Patient Name: Amanda Matson  MRN: [id number]  Encounter date: 1/14/2025    Time Calculation  Start Time: 1048  Stop Time: 1130  Time Calculation (min): 42 min  PT Therapeutic Procedures Time Entry  Neuromuscular Re-Education Time Entry: 15  Therapeutic Exercise Time Entry: 20  Therapeutic Activity Time Entry: 5    Visit # 20 of 21 (Eval +20)  Visits/Dates Authorized: EVAL ONLY-Auth Rcvd 20 visits 9/3-12/31 CPTs [id number]     Current Problem:   Problem List Items Addressed This Visit             ICD-10-CM    Gait instability R26.81    Left ankle pain M25.572    Pes planus of left foot M21.42    Primary osteoarthritis of left knee M17.12    Right hip pain M25.551    Valgus deformity, not elsewhere classified, unspecified ankle M21.079     Precautions:    N/A       Subjective   General:   General Comment: Pt feeling more confident with balance. With holding onto walking stick and family, able to manage some irregular paths. Feeling like exercise doug is helpful.       Pre-Treatment Symptoms:   Pain Assessment: 0-10  0-10 (Numeric) Pain Score:  (pain not a primary complaint but has chronic jt pain L knee)    Objective   Findings:    1/14/25 Tandem L->R 24sec, R->L 32 (was 5sec 9/3/24)  SLS L 3 sec, R 7 sec (was 0sec 9/3/24)    At eval 9/2024 was: Romberg Eyes Closed 5 sec   Tandem: Firm Eyes Open 5 sec, Eyes Closed 0 sec     Severe valgus collapse L foot, pt with rearfoot eversion, collapsed arch, forefoot varus, drop of metatarsal arch.  Mild palpable L ok-patellar crepitus (non-painful) with ckc squat.        Treatments:   Therapeutic Exercise:   Reviewed HEP, pt finding [employer] doug helpful    Deferred:  Nu Step L 5 x 6 min , seat 8 arms 9  TBSS orange x 3 laps  TB forward walks (monster) orange x 2 laps  Shuttle DL 82.5-100# 2x10  Shuttle SL 50# 1x15 R/L   Sit to stand 2x12 from LMT to reduce knee pain and 10# coreball  Heel raise in standing  "2x10  Toe raise against wall 2x10  Resisted Gait 4-way f/b 15# , side 12.5# x3   Toe walk side step along counter 15 feet x 4 L/R  Seated Tloop ankle PF red 2x20 L/R      Neuromuscular Re-Ed:  Reviewed HEP    Deferred:  Walking with head turns L/R, up/down  Foam balance beam tandem fw and backward walks  Tandem stance on blue beam R and L x 30\" each  Blue sabi pad Foam SLS L/R 3x20\" each  SLS blue oval with target taps ( 3 way taps)  12\" hurdles no UE support fwd/lateral x 4 each  Mod SLS L/R  Small rocker board AP rocks/balance- DL 30 sec each , SL 30 sec each  Step tap 9 inches foam fw and lat 2 x 15 each  RB AP rocking/balance, lateral balance EO x 30\" each    HEP/Access Codes:  Access Code: [email or web address] Date: 10/25/2024   - Side Stepping with Resistance at Ankles  - 1 x daily - 5 x weekly - 2 sets - 10 reps  - Long Sitting Ankle Dorsiflexion with Anchored Resistance  - 1 x daily - 5 x weekly - 2 sets - 10 reps  - Ankle Eversion with Resistance  - 1 x daily - 5 x weekly - 2 sets - 10 reps  - Ankle Inversion with Resistance  - 1 x daily - 5 x weekly - 2 sets - 10 reps  - Ankle and Toe Plantarflexion with Resistance  - 1 x daily - 5 x weekly - 2 sets - 10 reps  - Standing Terminal Knee Extension with Resistance  - 1 x daily - 5 x weekly - 2 sets - 15 reps  - Step Up  - 1 x daily - 5 x weekly - 2 sets - 15 reps  - Forward Step Down with Heel Tap and Counter Support  - 1 x daily - 5 x weekly - 2 sets - 15 reps  - Step Taps on High Step  - 1 x daily - 5 x weekly - 1 sets - 40 reps  - quad set and straight leg combo  - 1 x daily - 5 x weekly - 2 sets - 15 reps  - Single Leg Balance on Foam Pad  - 1-2 x daily - 6 x weekly - 1 sets - 5 reps - 30 sec hold  Access Code: [email or web address] Date: 12/13/2024 Added:   - Standing Terminal Knee Extension with Resistance  - 1 x daily - 7 x weekly - 3 sets - 10-30 reps - 0-5 hold  - Side Stepping on Toes at Counter  - 1 x daily - 7 x weekly - 3 sets - 20-60 seconds  1/14/25: single leg " stand    Assessment:   PT Assessment  Assessment Comment: Pt satisfied with improvments in function. Demonstrates improved objective measures. Further progress expected with consistency with HEP and supportive footwear.    Post-Treatment Symptoms:   Response to Interventions: No change in pain    Plan:   OP PT Plan  PT Plan:  (Trial self management up to 1 month)    Goals:   Active       PT Problem       PT Goal 1 (Progressing)       Start:  09/06/24    Expected End:  02/14/25       Partly met 1. Pt will improve ABC score to 95% confidence  met 2. Pt will be able to peform tandem stance with eyes closed x 20+ seconds  Met 3. Pt will prevent falls  Partly met 4. Pt will improve B LE strength and ROM to WNL  Nearly met 5. Pt will be independent with HEP.

## 2025-02-10 ENCOUNTER — APPOINTMENT (OUTPATIENT)
Dept: PHYSICAL THERAPY | Facility: CLINIC | Age: 75
End: 2025-02-10
Payer: COMMERCIAL

## 2025-02-11 ENCOUNTER — TREATMENT (OUTPATIENT)
Dept: PHYSICAL THERAPY | Facility: CLINIC | Age: 75
End: 2025-02-11
Payer: COMMERCIAL

## 2025-02-11 DIAGNOSIS — R26.81 GAIT INSTABILITY: ICD-10-CM

## 2025-02-11 DIAGNOSIS — G89.29 CHRONIC PAIN OF LEFT ANKLE: ICD-10-CM

## 2025-02-11 DIAGNOSIS — M17.12 PRIMARY OSTEOARTHRITIS OF LEFT KNEE: ICD-10-CM

## 2025-02-11 DIAGNOSIS — M25.572 CHRONIC PAIN OF LEFT ANKLE: ICD-10-CM

## 2025-02-11 DIAGNOSIS — M21.42 PES PLANUS OF LEFT FOOT: ICD-10-CM

## 2025-02-11 DIAGNOSIS — M25.551 RIGHT HIP PAIN: ICD-10-CM

## 2025-02-11 DIAGNOSIS — M21.079 VALGUS DEFORMITY, NOT ELSEWHERE CLASSIFIED, UNSPECIFIED ANKLE: ICD-10-CM

## 2025-02-11 PROCEDURE — 97110 THERAPEUTIC EXERCISES: CPT | Mod: GP

## 2025-02-11 PROCEDURE — 97112 NEUROMUSCULAR REEDUCATION: CPT | Mod: GP

## 2025-02-11 ASSESSMENT — PAIN SCALES - GENERAL: PAINLEVEL_OUTOF10: 1

## 2025-02-11 ASSESSMENT — PAIN - FUNCTIONAL ASSESSMENT: PAIN_FUNCTIONAL_ASSESSMENT: 0-10

## 2025-02-11 NOTE — PROGRESS NOTES
"Physical Therapy Treatment    Patient Name: Amanda Matson  MRN: [id number]  Encounter date: 2/11/2025    Time Calculation  Start Time: 1232  Stop Time: 1330  Time Calculation (min): 58 min  PT Therapeutic Procedures Time Entry  Neuromuscular Re-Education Time Entry: 40  Therapeutic Exercise Time Entry: 18  Approved for 13 more visits (21 + 13 = 34 visits)  Visit # 21 of 34 (eval + 20 + 13)   Expires 4/30/25  - Per pt she was approved for more visits.  Visits/Dates Authorized: 4/30/25    Current Problem:   Problem List Items Addressed This Visit             ICD-10-CM    Gait instability R26.81    Left ankle pain M25.572    Pes planus of left foot M21.42    Primary osteoarthritis of left knee M17.12    Right hip pain M25.551    Valgus deformity, not elsewhere classified, unspecified ankle M21.079             Subjective Pt notices that since receiving her cortisone injection she doesn't have much pain in the L knee. Continues to struggle with balance and reports this is a remaining issue for her and the reason she is still coming.     Pain Assessment: 0-10  0-10 (Numeric) Pain Score: 1    Objective Tendency to perform balance exercises fast to avoid extended SLS time.         Treatments:      Therapeutic Exercise 18  Recumbant bike x 10 min  Leg press 2 sets x 10 reps   First set at 40#   Second set at 50#  9 in box step ups alternating to fatigue     Neuromuscular Re-Ed: 40  3 way hip without UE assist for balance   blue foam   - side to side Side stepping on  - fwd/ retro  -Tandem stance on blue beam R and L   On foam   -HR 2 x 10   - TR 2 x 10  Walking with head turns L/R, up/down  on albert disc   -Forward / backward rocking    - side to side   - circles    Clockwise    Counter clockwise  SLS blue oval with target taps ( 3 way taps)  12\" hurdles no UE support   -fwd/lateral   - side to side  Step tap 9 inches (cued to go slow)    Assessment: Pt had good response to session feeling appropriately challenged with " exercises today for balance and strength. Pt denies any knee pain with exercises performed.       Plan: Cont with therapeutic interventions for up to allotted sessions to be completed by 04/30/2025.       Goals:   Active       PT Problem       PT Goal 1 (Progressing)       Start:  09/06/24    Expected End:  02/14/25       Partly met 1. Pt will improve ABC score to 95% confidence  met 2. Pt will be able to peform tandem stance with eyes closed x 20+ seconds  Met 3. Pt will prevent falls  Partly met 4. Pt will improve B LE strength and ROM to WNL  Nearly met 5. Pt will be independent with HEP.

## 2025-02-12 ENCOUNTER — PHARMACY VISIT (OUTPATIENT)
Dept: PHARMACY | Facility: CLINIC | Age: 75
End: 2025-02-12
Payer: COMMERCIAL

## 2025-02-12 PROCEDURE — RXMED WILLOW AMBULATORY MEDICATION CHARGE

## 2025-02-12 RX ORDER — AMOXICILLIN 500 MG/1
CAPSULE ORAL
Qty: 30 CAPSULE | Refills: 0 | OUTPATIENT
Start: 2025-02-10

## 2025-02-20 ENCOUNTER — TELEPHONE (OUTPATIENT)
Dept: OBSTETRICS AND GYNECOLOGY | Facility: CLINIC | Age: 75
End: 2025-02-20

## 2025-02-24 ENCOUNTER — TREATMENT (OUTPATIENT)
Dept: PHYSICAL THERAPY | Facility: CLINIC | Age: 75
End: 2025-02-24
Payer: COMMERCIAL

## 2025-02-24 ENCOUNTER — APPOINTMENT (OUTPATIENT)
Dept: PRIMARY CARE | Facility: CLINIC | Age: 75
End: 2025-02-24
Payer: COMMERCIAL

## 2025-02-24 VITALS
BODY MASS INDEX: 25.16 KG/M2 | DIASTOLIC BLOOD PRESSURE: 80 MMHG | SYSTOLIC BLOOD PRESSURE: 138 MMHG | WEIGHT: 151 LBS | OXYGEN SATURATION: 98 % | HEART RATE: 64 BPM | HEIGHT: 65 IN

## 2025-02-24 DIAGNOSIS — G89.29 CHRONIC PAIN OF LEFT ANKLE: ICD-10-CM

## 2025-02-24 DIAGNOSIS — E78.2 MIXED HYPERLIPIDEMIA: ICD-10-CM

## 2025-02-24 DIAGNOSIS — M21.079 VALGUS DEFORMITY, NOT ELSEWHERE CLASSIFIED, UNSPECIFIED ANKLE: ICD-10-CM

## 2025-02-24 DIAGNOSIS — N39.46 URGE AND STRESS INCONTINENCE: ICD-10-CM

## 2025-02-24 DIAGNOSIS — M21.42 PES PLANUS OF LEFT FOOT: ICD-10-CM

## 2025-02-24 DIAGNOSIS — E03.9 ACQUIRED HYPOTHYROIDISM: ICD-10-CM

## 2025-02-24 DIAGNOSIS — M25.572 CHRONIC PAIN OF LEFT ANKLE: ICD-10-CM

## 2025-02-24 DIAGNOSIS — R26.81 GAIT INSTABILITY: ICD-10-CM

## 2025-02-24 DIAGNOSIS — M25.551 RIGHT HIP PAIN: ICD-10-CM

## 2025-02-24 DIAGNOSIS — M17.12 PRIMARY OSTEOARTHRITIS OF LEFT KNEE: ICD-10-CM

## 2025-02-24 DIAGNOSIS — I10 PRIMARY HYPERTENSION: Primary | ICD-10-CM

## 2025-02-24 PROBLEM — F32.1 MODERATE SINGLE CURRENT EPISODE OF MAJOR DEPRESSIVE DISORDER (MULTI): Status: RESOLVED | Noted: 2023-02-10 | Resolved: 2025-02-24

## 2025-02-24 PROCEDURE — 3075F SYST BP GE 130 - 139MM HG: CPT | Performed by: FAMILY MEDICINE

## 2025-02-24 PROCEDURE — 3079F DIAST BP 80-89 MM HG: CPT | Performed by: FAMILY MEDICINE

## 2025-02-24 PROCEDURE — 99214 OFFICE O/P EST MOD 30 MIN: CPT | Performed by: FAMILY MEDICINE

## 2025-02-24 PROCEDURE — 3008F BODY MASS INDEX DOCD: CPT | Performed by: FAMILY MEDICINE

## 2025-02-24 PROCEDURE — 1160F RVW MEDS BY RX/DR IN RCRD: CPT | Performed by: FAMILY MEDICINE

## 2025-02-24 PROCEDURE — 97110 THERAPEUTIC EXERCISES: CPT | Mod: GP,CQ

## 2025-02-24 PROCEDURE — 1123F ACP DISCUSS/DSCN MKR DOCD: CPT | Performed by: FAMILY MEDICINE

## 2025-02-24 PROCEDURE — 1159F MED LIST DOCD IN RCRD: CPT | Performed by: FAMILY MEDICINE

## 2025-02-24 PROCEDURE — 97112 NEUROMUSCULAR REEDUCATION: CPT | Mod: GP,CQ

## 2025-02-24 PROCEDURE — 1036F TOBACCO NON-USER: CPT | Performed by: FAMILY MEDICINE

## 2025-02-24 ASSESSMENT — PAIN SCALES - GENERAL: PAINLEVEL_OUTOF10: 0 - NO PAIN

## 2025-02-24 ASSESSMENT — PAIN - FUNCTIONAL ASSESSMENT: PAIN_FUNCTIONAL_ASSESSMENT: 0-10

## 2025-02-24 NOTE — PROGRESS NOTES
"Subjective   Patient ID: Amanda Matson is a 74 y.o. female who presents for Follow-up.    HPI   The patient reports that she is taking rosuvastatin and fenofibrate for hyperlipidemia, levothyroxine for hypothyroidism, trazodone for insomnia, chlorthalidone for hypertension Requip for restless legs and trospium for stress incontinence. She is taking these medications as prescribed and denies having side effects from them. Also takes vitamin D, ferrous gluconate, magnesium, potassium. Her depression is stable without medications at this time.     Reports she has been having digestive problems. She feels uncomfortable after eating something and has left flank pain is which is bothering her. Also, endorses dryness of mouth which might be from trospium.  Denies any change in medications. No nausea or vomiting. She had an episode of diverticulitis that required an ER visit in 2024.       Review of Systems  Constitutional: No fever or chills  Cardiovascular: no chest pain, no palpitations and no syncope.   Respiratory: no cough, no shortness of breath during exertion and no shortness of breath at rest.   Gastrointestinal: no abdominal pain, no nausea and no vomiting.  Neuro: No Headache, no dizziness    Objective   /80 (BP Location: Left arm, Patient Position: Sitting, BP Cuff Size: Adult)   Pulse 64   Ht 1.638 m (5' 4.5\")   Wt 68.5 kg (151 lb)   SpO2 98%   BMI 25.52 kg/m²     Physical Exam  Constitutional: Alert and in no acute distress. Well developed, well nourished  Head and Face: Head and face: Normal.    Cardiovascular: Heart rate and rhythm were normal, normal S1 and S2. No peripheral edema.   Pulmonary: No respiratory distress. Clear bilateral breath sounds.  Musculoskeletal: Gait and station: Normal. Muscle strength/tone: Normal.   Skin: Normal skin color and pigmentation, normal skin turgor, and no rash.    Psychiatric: Judgment and insight: Intact. Mood and affect: Normal.      Lab Results "   Component Value Date    WBC 4.3 (L) 07/27/2024    HGB 12.0 07/27/2024    HCT 37.8 07/27/2024     07/27/2024    CHOL 185 07/27/2024    TRIG 137 07/27/2024    HDL 59.6 07/27/2024    ALT 20 07/27/2024    AST 27 07/27/2024     07/27/2024    K 3.7 07/27/2024     07/27/2024    CREATININE 0.70 07/27/2024    BUN 20 07/27/2024    CO2 28 07/27/2024    TSH 2.40 07/27/2024    INR 1.0 05/16/2018    HGBA1C 5.6 07/27/2024       BI mammo bilateral screening tomosynthesis  Narrative: Interpreted By:  Miles Rogel,   STUDY:  BI MAMMO BILATERAL SCREENING TOMOSYNTHESIS;  1/9/2025 4:59 pm      ACCESSION NUMBER(S):  [id number]      ORDERING CLINICIAN:  KEON SCHMID      INDICATION:  Screening. Patient has a family history of breast cancer.      ,Z12.31 Encounter for screening mammogram for malignant neoplasm of  breast      COMPARISON:  01/08/2024, 12/27/2022, 12/20/2021      FINDINGS:  2D and tomosynthesis images were reviewed at 1 mm slice thickness.      Density:  There are scattered areas of fibroglandular density.      No suspicious masses or calcifications are identified.      Impression: No mammographic evidence of malignancy.      BI-RADS CATEGORY:  BI-RADS Category:  1 Negative.  Recommendation:  Annual Screening.  Recommended Date:  1 Year.  Laterality:  Bilateral.              For any future breast imaging appointments, please call [phone]  ([phone]).          MACRO:  None      Signed by: Miles Rogel 1/13/2025 4:36 PM  Dictation workstation:   [id number]      Assessment/Plan   Assessment & Plan  Primary hypertension  Well controlled. Continue chlorthalidone        Mixed hyperlipidemia  Continue fenofibrate and rosuvastatin        Acquired hypothyroidism  Continue levothyroxine        Urge and stress incontinence  Stop trospium due to side effects. Follow up with Uro-Gyn.           Your yearly Physical is due in: July 2025  When you call the office for your yearly Physical, please ask them to inform me  to order your blood work, so that you can get the fasting blood work before your appointment and we can discuss the results at your physical.      Please call me if any questions arise from now until your next visit. I will call you after I am done seeing patients. A Doctor is always available by phone when the office is closed. Please feel free to call for help with any problem that you feel shouldn't wait until the office re-opens.     Scribe Attestation  By signing my name below, I, Alexus Romo   attest that this documentation has been prepared under the direction and in the presence of Kathryn Dunham MD.

## 2025-02-24 NOTE — PROGRESS NOTES
"Physical Therapy Treatment    Patient Name: Amanda Matson  MRN: [id number]  Encounter date: 2/24/2025    Time Calculation  Start Time: 1031  Stop Time: 1114  Time Calculation (min): 43 min  PT Therapeutic Procedures Time Entry  Neuromuscular Re-Education Time Entry: 28  Therapeutic Exercise Time Entry: 15    Visit # 22 of 34 (eval + 20 + 13=34 total)   Visits/Dates Authorized: EVAL ONLY-Auth Rcvd 20 visits 9/3-12/31 CPTs [id number] [phone]   Auth Rcvd 15 visits 1/1/25 - 4/30/25 CPTs [id number] [phone] [id number]      Current Problem:   Problem List Items Addressed This Visit             ICD-10-CM    Gait instability R26.81    Left ankle pain M25.572    Pes planus of left foot M21.42    Primary osteoarthritis of left knee M17.12    Right hip pain M25.551    Valgus deformity, not elsewhere classified, unspecified ankle M21.079     Subjective   General:  Pain Assessment: 0-10  0-10 (Numeric) Pain Score: 0 - No painPatient states she is feeling good today, no pain. Patient reports she has been getting to HEP occasionally, busy with her daily schedule right now.     Objective    Tendency to perform balance exercises fast to avoid extended SLS time.        Treatments:      Therapeutic Exercise 18  Recumbant bike L4  x 6 min  Leg press 40# 1x10 , 50# 1x10   HR 2x10  TR 2x10    DNP:  9 in box step ups alternating to fatigue     Neuromuscular Re-Ed:  12\" hurdles no UE support , fwd reciprocal x4 laps /lateral x4 laps (occasional UE use for LOB)  3 way hip without UE assist for balance   Step tap 9 inches , fw x15 R/L , lateral x15 R/L   Staggered stance on airex 30 sec x 1 ea , with   Foam walk - tandem x4 laps , side stepping x4 laps  SLS blue sabi pad 30 sec x2 R/L     DNP:  Blue foam   - side to side Side stepping on  - fwd/ retro  -Tandem stance on blue beam R and L   On foam   -HR 2 x 10   - TR 2 x 10  Walking with head turns L/R, up/down  on albert disc   -Forward / backward rocking    - side to side   - " circles    Clockwise    Counter clockwise  SLS blue oval with target taps ( 3 way taps)      Assessment:    Treatment consisted of LE strengthening and main focus on balance. Patient requires min-mod assist from handrails for LOB, when balancing on complaint surfaces and SLS . Patient was feeling a little sore in LE at end of session, no pain.     Post treatment response:  Response to Interventions: No change in pain     Plan:    Cont with therapeutic interventions for up to allotted sessions to be completed by 04/30/2025.    Goals:   Active       PT Problem       PT Goal 1 (Progressing)       Start:  09/06/24    Expected End:  02/14/25       Partly met 1. Pt will improve ABC score to 95% confidence  met 2. Pt will be able to peform tandem stance with eyes closed x 20+ seconds  Met 3. Pt will prevent falls  Partly met 4. Pt will improve B LE strength and ROM to WNL  Nearly met 5. Pt will be independent with HEP.

## 2025-03-03 ENCOUNTER — TREATMENT (OUTPATIENT)
Dept: PHYSICAL THERAPY | Facility: CLINIC | Age: 75
End: 2025-03-03
Payer: COMMERCIAL

## 2025-03-03 DIAGNOSIS — M21.42 PES PLANUS OF LEFT FOOT: ICD-10-CM

## 2025-03-03 DIAGNOSIS — M25.551 RIGHT HIP PAIN: ICD-10-CM

## 2025-03-03 DIAGNOSIS — M25.572 CHRONIC PAIN OF LEFT ANKLE: ICD-10-CM

## 2025-03-03 DIAGNOSIS — R26.81 GAIT INSTABILITY: ICD-10-CM

## 2025-03-03 DIAGNOSIS — M17.12 PRIMARY OSTEOARTHRITIS OF LEFT KNEE: ICD-10-CM

## 2025-03-03 DIAGNOSIS — M21.079 VALGUS DEFORMITY, NOT ELSEWHERE CLASSIFIED, UNSPECIFIED ANKLE: ICD-10-CM

## 2025-03-03 DIAGNOSIS — G89.29 CHRONIC PAIN OF LEFT ANKLE: ICD-10-CM

## 2025-03-03 PROCEDURE — 97112 NEUROMUSCULAR REEDUCATION: CPT | Mod: GP,CQ

## 2025-03-03 PROCEDURE — 97110 THERAPEUTIC EXERCISES: CPT | Mod: GP,CQ

## 2025-03-03 ASSESSMENT — PAIN SCALES - GENERAL: PAINLEVEL_OUTOF10: 0 - NO PAIN

## 2025-03-03 ASSESSMENT — PAIN - FUNCTIONAL ASSESSMENT: PAIN_FUNCTIONAL_ASSESSMENT: 0-10

## 2025-03-03 NOTE — PROGRESS NOTES
"Physical Therapy Treatment    Patient Name: Amanda Matson  MRN: [id number]  Encounter date: 3/3/2025    Time Calculation  Start Time: 0943  Stop Time: 1028  Time Calculation (min): 45 min  PT Therapeutic Procedures Time Entry  Neuromuscular Re-Education Time Entry: 30  Therapeutic Exercise Time Entry: 15    Visit # 23 of 34 (eval + 20 + 13=34 total)   Visits/Dates Authorized: EVAL ONLY-Auth Rcvd 20 visits 9/3-12/31 CPTs [id number] [phone]   Auth Rcvd 15 visits 1/1/25 - 4/30/25 CPTs [id number] [phone]      Current Problem:   Problem List Items Addressed This Visit             ICD-10-CM    Gait instability R26.81    Left ankle pain M25.572    Pes planus of left foot M21.42    Primary osteoarthritis of left knee M17.12    Right hip pain M25.551    Valgus deformity, not elsewhere classified, unspecified ankle M21.079       Subjective   General:  Pain Assessment: 0-10  0-10 (Numeric) Pain Score: 0 - No painPatient states she isn't getting to HEP as often as she should, busy with work and daily activities. Patient reports she isn't feeling any soreness or pain today.     Objective    Tendency to perform balance exercises fast to avoid extended SLS time.        Treatments:      Therapeutic Exercise 18  Recumbant bike L5  x 6 min  HR 3 foot position 1x12 ea  TR 3 foot position 1x12 ea  HR with iso holds 5x10\" holds (cues to evenly distribute between R/L , L weaker)     DNP:  Leg press 40# 1x10 , 50# 1x10   9 in box step ups alternating to fatigue     Neuromuscular Re-Ed:  Resisted Gait 12.5# x3 ea  12\" hurdles no UE support , fwd reciprocal x3 laps /lateral x3 laps (occasional UE use for LOB)  Foam walk along counter (10 ft) - tandem x2 laps , side stepping x2 laps  Tandem stance foam 30 sec x 2 ea  Step tap 9 inches foam , fw x10 R/L , then over 12\" veronika to step 1x10 R/L    DNP:  3 way hip without UE assist for balance   Staggered stance on airex 30 sec x 1 ea , with   SLS blue sabi pad 30 sec x2 " R/L   On foam   -HR 2 x 10   - TR 2 x 10  Walking with head turns L/R, up/down  on albert disc   -Forward / backward rocking    - side to side   - circles    Clockwise    Counter clockwise  SLS blue oval with target taps ( 3 way taps)      Assessment:    Patient had no complaints during treatment session today. Patient challenged with forward navigation over hurdles, moderate use of counter top for LOB. Patient required verbal cues to bear more weight through L LE during heel raises, isometrics especially, tends to use R LE more with exercises. Patient encouraged to comply with HEP to maintain current progress.     Post treatment response:  Response to Interventions: No change in pain     Plan:    Cont with therapeutic interventions for up to allotted sessions to be completed by 04/30/2025.    Goals:   Active       PT Problem       PT Goal 1 (Progressing)       Start:  09/06/24    Expected End:  02/14/25       Partly met 1. Pt will improve ABC score to 95% confidence  met 2. Pt will be able to peform tandem stance with eyes closed x 20+ seconds  Met 3. Pt will prevent falls  Partly met 4. Pt will improve B LE strength and ROM to WNL  Nearly met 5. Pt will be independent with HEP.

## 2025-03-10 ENCOUNTER — TREATMENT (OUTPATIENT)
Dept: PHYSICAL THERAPY | Facility: CLINIC | Age: 75
End: 2025-03-10
Payer: COMMERCIAL

## 2025-03-10 DIAGNOSIS — R26.81 GAIT INSTABILITY: ICD-10-CM

## 2025-03-10 DIAGNOSIS — G89.29 CHRONIC PAIN OF LEFT ANKLE: ICD-10-CM

## 2025-03-10 DIAGNOSIS — M25.551 RIGHT HIP PAIN: ICD-10-CM

## 2025-03-10 DIAGNOSIS — M21.42 PES PLANUS OF LEFT FOOT: ICD-10-CM

## 2025-03-10 DIAGNOSIS — M21.079 VALGUS DEFORMITY, NOT ELSEWHERE CLASSIFIED, UNSPECIFIED ANKLE: ICD-10-CM

## 2025-03-10 DIAGNOSIS — M25.572 CHRONIC PAIN OF LEFT ANKLE: ICD-10-CM

## 2025-03-10 DIAGNOSIS — M17.12 PRIMARY OSTEOARTHRITIS OF LEFT KNEE: ICD-10-CM

## 2025-03-10 PROCEDURE — 97112 NEUROMUSCULAR REEDUCATION: CPT | Mod: GP

## 2025-03-10 PROCEDURE — 97110 THERAPEUTIC EXERCISES: CPT | Mod: GP

## 2025-03-10 ASSESSMENT — PAIN SCALES - GENERAL: PAINLEVEL_OUTOF10: 0 - NO PAIN

## 2025-03-10 ASSESSMENT — PAIN - FUNCTIONAL ASSESSMENT: PAIN_FUNCTIONAL_ASSESSMENT: 0-10

## 2025-03-10 NOTE — PROGRESS NOTES
"Physical Therapy Treatment    Patient Name: Amanda Matson  MRN: [id number]  Encounter date: 3/10/2025    Time Calculation  Start Time: 0845  Stop Time: 0930  Time Calculation (min): 45 min  PT Therapeutic Procedures Time Entry  Neuromuscular Re-Education Time Entry: 30  Therapeutic Exercise Time Entry: 12  Non-Billable Time  Non-billable time: 3    Visit # 24 of 34 (eval + 20 + 13=34 total)   Visits/Dates Authorized: EVAL ONLY-Auth Rcvd 20 visits 9/3-12/31 CPTs [phone]   Auth Rcvd 15 visits 1/1/25 - 4/30/25 CPTs [id number] [phone]      Current Problem:   Problem List Items Addressed This Visit             ICD-10-CM    Gait instability R26.81    Left ankle pain M25.572    Pes planus of left foot M21.42    Primary osteoarthritis of left knee M17.12    Right hip pain M25.551    Valgus deformity, not elsewhere classified, unspecified ankle M21.079       Subjective   General:  Pain Assessment: 0-10  0-10 (Numeric) Pain Score: 0 - No painPatient reports her back is a little achy today, but not too bad.     Objective    Tendency to perform balance exercises fast to avoid extended SLS time.        Treatments:      Therapeutic Exercise 18  Recumbant bike L5  x 6 min  Leg press 40# 1x12 , 50# 1x12  HR 3 foot position 1x12 ea    DNP:  TR 3 foot position 1x12 ea  HR with iso holds 5x10\" holds (cues to evenly distribute between R/L , L weaker)   9 in box step ups alternating to fatigue     Neuromuscular Re-Ed:  Resisted Gait 12.5# x3 ea  12\" hurdles no UE support , fwd reciprocal x3 laps /lateral x3 laps (occasional UE use for LOB)  Foam walk in // bars - tandem x5 laps , side stepping x3 laps  Tandem stance foam 30 sec x 2 ea  Step tap 9 inches foam , fw and lat x15 R/L   Floor ladder step coordination drills  Fw walk with 360, stop/go, and bw commands    DNP:  3 way hip without UE assist for balance   Staggered stance on airex 30 sec x 1 ea , with   SLS blue sabi pad 30 sec x2 R/L   On " foam   -HR 2 x 10   - TR 2 x 10  Walking with head turns L/R, up/down  on labert disc   -Forward / backward rocking    - side to side   - circles    Clockwise    Counter clockwise  SLS blue oval with target taps ( 3 way taps)      Assessment:    Patient challenged with today's balance activities, but no report of increased pain. Some difficulty with 3-way HR due to structural changes in L foot.     Post treatment response:  Response to Interventions: No change in pain     Plan:    Cont with therapeutic interventions for up to allotted sessions to be completed by 04/30/2025.    Goals:   Active       PT Problem       PT Goal 1 (Progressing)       Start:  09/06/24    Expected End:  02/14/25       Partly met 1. Pt will improve ABC score to 95% confidence  met 2. Pt will be able to peform tandem stance with eyes closed x 20+ seconds  Met 3. Pt will prevent falls  Partly met 4. Pt will improve B LE strength and ROM to WNL  Nearly met 5. Pt will be independent with HEP.

## 2025-03-11 PROCEDURE — RXMED WILLOW AMBULATORY MEDICATION CHARGE

## 2025-03-12 ENCOUNTER — PHARMACY VISIT (OUTPATIENT)
Dept: PHARMACY | Facility: CLINIC | Age: 75
End: 2025-03-12
Payer: COMMERCIAL

## 2025-03-12 PROCEDURE — RXMED WILLOW AMBULATORY MEDICATION CHARGE

## 2025-03-17 ENCOUNTER — APPOINTMENT (OUTPATIENT)
Dept: PHYSICAL THERAPY | Facility: CLINIC | Age: 75
End: 2025-03-17
Payer: COMMERCIAL

## 2025-03-18 ENCOUNTER — PHARMACY VISIT (OUTPATIENT)
Dept: PHARMACY | Facility: CLINIC | Age: 75
End: 2025-03-18
Payer: COMMERCIAL

## 2025-03-18 PROCEDURE — RXOTC WILLOW AMBULATORY OTC CHARGE

## 2025-03-24 ENCOUNTER — TREATMENT (OUTPATIENT)
Dept: PHYSICAL THERAPY | Facility: CLINIC | Age: 75
End: 2025-03-24
Payer: COMMERCIAL

## 2025-03-24 DIAGNOSIS — G89.29 CHRONIC PAIN OF LEFT ANKLE: ICD-10-CM

## 2025-03-24 DIAGNOSIS — M21.42 PES PLANUS OF LEFT FOOT: ICD-10-CM

## 2025-03-24 DIAGNOSIS — M17.12 PRIMARY OSTEOARTHRITIS OF LEFT KNEE: ICD-10-CM

## 2025-03-24 DIAGNOSIS — M25.572 CHRONIC PAIN OF LEFT ANKLE: ICD-10-CM

## 2025-03-24 DIAGNOSIS — R26.81 GAIT INSTABILITY: ICD-10-CM

## 2025-03-24 DIAGNOSIS — M25.551 RIGHT HIP PAIN: ICD-10-CM

## 2025-03-24 DIAGNOSIS — M21.079 VALGUS DEFORMITY, NOT ELSEWHERE CLASSIFIED, UNSPECIFIED ANKLE: ICD-10-CM

## 2025-03-24 PROCEDURE — 97110 THERAPEUTIC EXERCISES: CPT | Mod: GP,CQ

## 2025-03-24 PROCEDURE — 97112 NEUROMUSCULAR REEDUCATION: CPT | Mod: GP,CQ

## 2025-03-24 ASSESSMENT — PAIN SCALES - GENERAL: PAINLEVEL_OUTOF10: 5 - MODERATE PAIN

## 2025-03-24 ASSESSMENT — PAIN - FUNCTIONAL ASSESSMENT: PAIN_FUNCTIONAL_ASSESSMENT: 0-10

## 2025-03-24 NOTE — PROGRESS NOTES
"Physical Therapy Treatment    Patient Name: Amanda Matson  MRN: [id number]  Encounter date: 3/24/2025    Time Calculation  Start Time: 0900  Stop Time: 0942  Time Calculation (min): 42 min  PT Therapeutic Procedures Time Entry  Neuromuscular Re-Education Time Entry: 24  Therapeutic Exercise Time Entry: 18    Visit # 25 of 34 (eval + 20 + 13=34 total)   Visits/Dates Authorized: EVAL ONLY-Auth Rcvd 20 visits 9/3-12/31 CPTs [phone]   Auth Rcvd 15 visits 1/1/25 - 4/30/25 CPTs [id number] [phone]      Current Problem:   Problem List Items Addressed This Visit             ICD-10-CM    Gait instability R26.81    Left ankle pain M25.572    Pes planus of left foot M21.42    Primary osteoarthritis of left knee M17.12    Right hip pain M25.551    Valgus deformity, not elsewhere classified, unspecified ankle M21.079         Subjective   General:  Patient reports she is not consistent with HEP, just too busy to do them. Patient states her L knee is feeling painful lately, cortisone injection from December is wearing off.     Pre-Treatment Symptoms:   Pain Assessment: 0-10  0-10 (Numeric) Pain Score: 5 - Moderate pain  Pain Location: Knee  Pain Orientation: Left    Objective    Tendency to perform balance exercises fast to avoid extended SLS time.        Treatments:      Therapeutic Exercise  Recumbant bike L5  x 6 min , seat 5  [facility] 45-50# 2x10  Leg press DL 40# 1x12 , SL 20# 1x12 R/L (more challenging on L LE)   HR 3 foot position 1x12 ea  HR with iso holds 4x15\" holds (cues to evenly distribute between R/L , L weaker)     DNP:  TR 3 foot position 1x12 ea  9 in box step ups alternating to fatigue     Neuromuscular Re-Ed:  Resisted Gait 4 way 15# x3 ea  12\" hurdles no UE support , fwd reciprocal x3 laps /lateral x3 laps (occasional UE use for LOB)  Tandem stance foam 30 sec x 2 ea  Foam walk in // bars - tandem x4 laps , side stepping x4 laps    DNP:  Step tap 9 inches foam , fw and lat x15 R/L "   Floor ladder step coordination drills  Fw walk with 360, stop/go, and bw commands  3 way hip without UE assist for balance   Staggered stance on airex 30 sec x 1 ea , with   SLS blue sabi pad 30 sec x2 R/L   On foam   -HR 2 x 10   - TR 2 x 10  Walking with head turns L/R, up/down  on albert disc   -Forward / backward rocking    - side to side   - circles    Clockwise    Counter clockwise  SLS blue oval with target taps ( 3 way taps)      Assessment:    Patient tolerated all exercises today with minimal complaint of L knee discomfort. Patient challenged with balance activities, moderate use of UE for LOB. Patient encouraged to comply with HEP to progress LE strength and balance. Patient was feeling looser in L knee at end of session.     Post treatment response:  Response to Interventions: Decrease in pain     Plan:    Cont with therapeutic interventions for up to allotted sessions to be completed by 04/30/2025.    Goals:   Active       PT Problem       PT Goal 1 (Progressing)       Start:  09/06/24    Expected End:  02/14/25       Partly met 1. Pt will improve ABC score to 95% confidence  met 2. Pt will be able to peform tandem stance with eyes closed x 20+ seconds  Met 3. Pt will prevent falls  Partly met 4. Pt will improve B LE strength and ROM to WNL  Nearly met 5. Pt will be independent with HEP.

## 2025-03-31 ENCOUNTER — APPOINTMENT (OUTPATIENT)
Dept: PHYSICAL THERAPY | Facility: CLINIC | Age: 75
End: 2025-03-31
Payer: COMMERCIAL

## 2025-04-07 ENCOUNTER — TREATMENT (OUTPATIENT)
Dept: PHYSICAL THERAPY | Facility: CLINIC | Age: 75
End: 2025-04-07
Payer: COMMERCIAL

## 2025-04-07 DIAGNOSIS — G89.29 CHRONIC PAIN OF LEFT ANKLE: ICD-10-CM

## 2025-04-07 DIAGNOSIS — M21.079 VALGUS DEFORMITY, NOT ELSEWHERE CLASSIFIED, UNSPECIFIED ANKLE: ICD-10-CM

## 2025-04-07 DIAGNOSIS — M25.572 CHRONIC PAIN OF LEFT ANKLE: ICD-10-CM

## 2025-04-07 DIAGNOSIS — M25.551 RIGHT HIP PAIN: ICD-10-CM

## 2025-04-07 DIAGNOSIS — M17.12 PRIMARY OSTEOARTHRITIS OF LEFT KNEE: ICD-10-CM

## 2025-04-07 DIAGNOSIS — M21.42 PES PLANUS OF LEFT FOOT: ICD-10-CM

## 2025-04-07 DIAGNOSIS — R26.81 GAIT INSTABILITY: ICD-10-CM

## 2025-04-07 PROCEDURE — 97112 NEUROMUSCULAR REEDUCATION: CPT | Mod: GP,CQ

## 2025-04-07 PROCEDURE — 97110 THERAPEUTIC EXERCISES: CPT | Mod: GP,CQ

## 2025-04-07 ASSESSMENT — PAIN SCALES - GENERAL: PAINLEVEL_OUTOF10: 5 - MODERATE PAIN

## 2025-04-07 ASSESSMENT — PAIN - FUNCTIONAL ASSESSMENT: PAIN_FUNCTIONAL_ASSESSMENT: 0-10

## 2025-04-07 NOTE — PROGRESS NOTES
"Physical Therapy Treatment    Patient Name: Amanda Matson  MRN: [id number]  Encounter date: 4/7/2025    Time Calculation  Start Time: 0903  Stop Time: 0944  Time Calculation (min): 41 min  PT Therapeutic Procedures Time Entry  Neuromuscular Re-Education Time Entry: 25  Therapeutic Exercise Time Entry: 15    Visit # 26 of 34 (eval + 20 + 13=34 total)   Visits/Dates Authorized: EVAL ONLY-Auth Rcvd 20 visits 9/3-12/31 CPTs [phone]   Auth Rcvd 15 visits 1/1/25 - 4/30/25 CPTs [id number] [phone]      Current Problem:   Problem List Items Addressed This Visit             ICD-10-CM    Gait instability R26.81    Left ankle pain M25.572    Pes planus of left foot M21.42    Primary osteoarthritis of left knee M17.12    Right hip pain M25.551    Valgus deformity, not elsewhere classified, unspecified ankle M21.079           Subjective   General:  Patient states she is wearing knee brace on L knee today, been painful recently. Patient states the L knee feels better after it moves around and gets blood flow. Will be getting an steroid injection soon for L knee. Patient reports she is not consistent with HEP, just too busy to do them.    Pre-Treatment Symptoms:   Pain Assessment: 0-10  0-10 (Numeric) Pain Score: 5 - Moderate pain    Objective    Tendency to perform balance exercises fast to avoid extended SLS time.        Treatments:      Therapeutic Exercise  Recumbant bike L5  x 6 min , seat 5  Leg press DL 40# 1x12 , SL 20# 1x10 R/L (more challenging on L LE)   HR 3 foot position 1x12 ea    DNP:  TR 3 foot position 1x12 ea  9 in box step ups alternating to fatigue  HSC 45-50# 2x10  HR with iso holds 4x15\" holds (cues to evenly distribute between R/L , L weaker)     Neuromuscular Re-Ed:  Resisted Gait 4 way 15# x4 ea  6\" hurdles step to, reciprocal , side step , at counter , no UE support 3 laps ea  Airex march in place , without UE support , 1 min  SLS firm 30 sec x 2 R/L   Tandem stance foam 30 " "sec x 2 ea    DNP:  12\" hurdles no UE support , fwd reciprocal x3 laps /lateral x3 laps (occasional UE use for LOB)  Foam walk in // bars - tandem x4 laps , side stepping x4 laps  Step tap 9 inches foam , fw and lat x15 R/L   Floor ladder step coordination drills  Fw walk with 360, stop/go, and bw commands  3 way hip without UE assist for balance   Staggered stance on airex 30 sec x 1 ea , with   SLS blue sabi pad 30 sec x2 R/L   On foam   -HR 2 x 10   - TR 2 x 10  Walking with head turns L/R, up/down  on albert disc   -Forward / backward rocking    - side to side   - circles    Clockwise    Counter clockwise  SLS blue oval with target taps ( 3 way taps)      Assessment:    Treatment focused on continued balance training and LE strengthening. Patient continues to be challenged with balance activities, static and dynamic. Focused on slowing down speed of steps with hurdles, to improve single leg balance. Patient was feeling less pain in L knee at end of session. Encouraged patient to comply with HEP to maintain current progress.     Post treatment response:  Response to Interventions: Decrease in pain     Plan:    Cont with therapeutic interventions for up to allotted sessions to be completed by 04/30/2025.    Goals:   Active       PT Problem       PT Goal 1 (Progressing)       Start:  09/06/24    Expected End:  02/14/25       Partly met 1. Pt will improve ABC score to 95% confidence  met 2. Pt will be able to peform tandem stance with eyes closed x 20+ seconds  Met 3. Pt will prevent falls  Partly met 4. Pt will improve B LE strength and ROM to WNL  Nearly met 5. Pt will be independent with HEP.            "

## 2025-04-21 ENCOUNTER — TREATMENT (OUTPATIENT)
Dept: PHYSICAL THERAPY | Facility: CLINIC | Age: 75
End: 2025-04-21
Payer: COMMERCIAL

## 2025-04-21 DIAGNOSIS — M25.572 CHRONIC PAIN OF LEFT ANKLE: ICD-10-CM

## 2025-04-21 DIAGNOSIS — M17.12 PRIMARY OSTEOARTHRITIS OF LEFT KNEE: ICD-10-CM

## 2025-04-21 DIAGNOSIS — M21.42 PES PLANUS OF LEFT FOOT: ICD-10-CM

## 2025-04-21 DIAGNOSIS — M21.079 VALGUS DEFORMITY, NOT ELSEWHERE CLASSIFIED, UNSPECIFIED ANKLE: ICD-10-CM

## 2025-04-21 DIAGNOSIS — G89.29 CHRONIC PAIN OF LEFT ANKLE: ICD-10-CM

## 2025-04-21 DIAGNOSIS — R26.81 GAIT INSTABILITY: ICD-10-CM

## 2025-04-21 DIAGNOSIS — M25.551 RIGHT HIP PAIN: ICD-10-CM

## 2025-04-21 PROCEDURE — 97112 NEUROMUSCULAR REEDUCATION: CPT | Mod: GP,CQ

## 2025-04-21 PROCEDURE — RXMED WILLOW AMBULATORY MEDICATION CHARGE

## 2025-04-21 PROCEDURE — 97110 THERAPEUTIC EXERCISES: CPT | Mod: GP,CQ

## 2025-04-21 ASSESSMENT — PAIN - FUNCTIONAL ASSESSMENT: PAIN_FUNCTIONAL_ASSESSMENT: 0-10

## 2025-04-21 ASSESSMENT — PAIN SCALES - GENERAL: PAINLEVEL_OUTOF10: 4

## 2025-04-21 NOTE — PROGRESS NOTES
"Physical Therapy Treatment    Patient Name: Amanda Matson  MRN: [id number]  Encounter date: 4/21/2025    Time Calculation  Start Time: 0901  Stop Time: 0943  Time Calculation (min): 42 min  PT Therapeutic Procedures Time Entry  Neuromuscular Re-Education Time Entry: 15  Therapeutic Exercise Time Entry: 27    Visit # 27 of 34 (eval + 20 + 13=34 total)   Visits/Dates Authorized: EVAL ONLY-Auth Rcvd 20 visits 9/3-12/31 CPTs [id number] [phone]   Auth Rcvd 15 visits 1/1/25 - 4/30/25 CPTs [id number] [phone]      Current Problem:   Problem List Items Addressed This Visit           ICD-10-CM    Gait instability R26.81    Left ankle pain M25.572    Pes planus of left foot M21.42    Primary osteoarthritis of left knee M17.12    Right hip pain M25.551    Valgus deformity, not elsewhere classified, unspecified ankle M21.079       Subjective   General:  Patient states she needs to schedule cortisone injection for L knee, getting painful again. Patient reports she is still not consistent with HEP, needs to do better.     Pre-Treatment Symptoms:   Pain Assessment: 0-10  0-10 (Numeric) Pain Score: 4    Objective    Tendency to perform balance exercises fast to avoid extended SLS time.        Treatments:      Therapeutic Exercise  Recumbant bike L5  x 6 min , seat 5  Quad extension DL 10-25# 2x12  HSC 50-55# 2x12  Leg press DL 40# 1x12 , SL 20# 1x12 R/L (more challenging on L LE)   HR 3 foot position 1x12 ea  TR 3 foot position 1x12 ea    DNP:  9 in box step ups alternating to fatigue  HR with iso holds 4x15\" holds (cues to evenly distribute between R/L , L weaker)     Neuromuscular Re-Ed:  6\" hurdles step to, reciprocal , side step , //bars , no UE support 4 laps ea  Foam walk- forward and side step , with 6\" hurdles , //bars , no to min UE support , 3 laps ea    DNP:  Resisted Gait 4 way 15# x4 ea  Airex march in place , without UE support , 1 min  SLS firm 30 sec x 2 R/L   Tandem stance foam 30 sec x 2 " "ea  12\" hurdles no UE support , fwd reciprocal x3 laps /lateral x3 laps (occasional UE use for LOB)  Foam walk in // bars - tandem x4 laps , side stepping x4 laps  Step tap 9 inches foam , fw and lat x15 R/L   Floor ladder step coordination drills  Fw walk with 360, stop/go, and bw commands  3 way hip without UE assist for balance   Staggered stance on airex 30 sec x 1 ea , with   SLS blue sabi pad 30 sec x2 R/L   On foam   -HR 2 x 10   - TR 2 x 10  Walking with head turns L/R, up/down  on albert disc   -Forward / backward rocking    - side to side   - circles    Clockwise    Counter clockwise  SLS blue oval with target taps ( 3 way taps)    HEP/Access Codes:  Access Code: [email or web address] Date: 10/25/2024   - Side Stepping with Resistance at Ankles  - 1 x daily - 5 x weekly - 2 sets - 10 reps  - Long Sitting Ankle Dorsiflexion with Anchored Resistance  - 1 x daily - 5 x weekly - 2 sets - 10 reps  - Ankle Eversion with Resistance  - 1 x daily - 5 x weekly - 2 sets - 10 reps  - Ankle Inversion with Resistance  - 1 x daily - 5 x weekly - 2 sets - 10 reps  - Ankle and Toe Plantarflexion with Resistance  - 1 x daily - 5 x weekly - 2 sets - 10 reps  - Standing Terminal Knee Extension with Resistance  - 1 x daily - 5 x weekly - 2 sets - 15 reps  - Step Up  - 1 x daily - 5 x weekly - 2 sets - 15 reps  - Forward Step Down with Heel Tap and Counter Support  - 1 x daily - 5 x weekly - 2 sets - 15 reps  - Step Taps on High Step  - 1 x daily - 5 x weekly - 1 sets - 40 reps  - quad set and straight leg combo  - 1 x daily - 5 x weekly - 2 sets - 15 reps  - Single Leg Balance on Foam Pad  - 1-2 x daily - 6 x weekly - 1 sets - 5 reps - 30 sec hold  Access Code: [email or web address] Date: 12/13/2024 Added:   - Standing Terminal Knee Extension with Resistance  - 1 x daily - 7 x weekly - 3 sets - 10-30 reps - 0-5 hold  - Side Stepping on Toes at Counter  - 1 x daily - 7 x weekly - 3 sets - 20-60 seconds  1/14/25: single leg stand    Assessment:    Patient " treatment focused on continued LE strengthening and balance training. Patient required minimal use of UE with hurdles, but once on compliant surface, min-mod UE support needed for LOB. Patient encouraged to comply with HEP to make and maintain progress with balance and LE strength. Reissued HEP with HEP tracker page, to allow improved schedule with exercises.     Post treatment response:  Response to Interventions: Decrease in pain     Plan:    Cont with therapeutic interventions for up to allotted sessions to be completed by 04/30/2025.    Goals:   Active       PT Problem       PT Goal 1 (Progressing)       Start:  09/06/24    Expected End:  02/14/25       Partly met 1. Pt will improve ABC score to 95% confidence  met 2. Pt will be able to peform tandem stance with eyes closed x 20+ seconds  Met 3. Pt will prevent falls  Partly met 4. Pt will improve B LE strength and ROM to WNL  Nearly met 5. Pt will be independent with HEP.

## 2025-04-26 ENCOUNTER — PHARMACY VISIT (OUTPATIENT)
Dept: PHARMACY | Facility: CLINIC | Age: 75
End: 2025-04-26
Payer: COMMERCIAL

## 2025-04-28 ENCOUNTER — APPOINTMENT (OUTPATIENT)
Dept: PHYSICAL THERAPY | Facility: CLINIC | Age: 75
End: 2025-04-28
Payer: COMMERCIAL

## 2025-05-05 ENCOUNTER — DOCUMENTATION (OUTPATIENT)
Dept: PHYSICAL THERAPY | Facility: CLINIC | Age: 75
End: 2025-05-05
Payer: COMMERCIAL

## 2025-05-05 ENCOUNTER — APPOINTMENT (OUTPATIENT)
Dept: PHYSICAL THERAPY | Facility: CLINIC | Age: 75
End: 2025-05-05
Payer: COMMERCIAL

## 2025-05-05 NOTE — PROGRESS NOTES
Physical Therapy    Discharge Summary    Name: Amanda Lee  MRN: [id number]  : 1950  Date: 25    Discharge Summary: PT    Discharge Information: Date of discharge 25, Date of last visit 25, Date of evaluation 9/3/24, Number of attended visits Eval + 34 follow-up visits, and Referred for Gait instability, L knee OA    Therapy Summary: Patient's insurance auth  after 25. Patient has HEP that she will continue for home maintenance.    Discharge Status: Discharge and resolve episode.     Rehab Discharge Reason: Other - see above

## 2025-05-06 ENCOUNTER — APPOINTMENT (OUTPATIENT)
Dept: ORTHOPEDIC SURGERY | Facility: CLINIC | Age: 75
End: 2025-05-06
Payer: COMMERCIAL

## 2025-05-06 DIAGNOSIS — M17.12 PRIMARY OSTEOARTHRITIS OF LEFT KNEE: Primary | ICD-10-CM

## 2025-05-06 PROCEDURE — 1036F TOBACCO NON-USER: CPT | Performed by: FAMILY MEDICINE

## 2025-05-06 PROCEDURE — 1159F MED LIST DOCD IN RCRD: CPT | Performed by: FAMILY MEDICINE

## 2025-05-06 PROCEDURE — 99213 OFFICE O/P EST LOW 20 MIN: CPT | Performed by: FAMILY MEDICINE

## 2025-05-06 PROCEDURE — 20610 DRAIN/INJ JOINT/BURSA W/O US: CPT | Performed by: FAMILY MEDICINE

## 2025-05-06 RX ORDER — LIDOCAINE HYDROCHLORIDE 10 MG/ML
4 INJECTION, SOLUTION INFILTRATION; PERINEURAL
Status: COMPLETED | OUTPATIENT
Start: 2025-05-06 | End: 2025-05-06

## 2025-05-06 RX ORDER — TRIAMCINOLONE ACETONIDE 40 MG/ML
40 INJECTION, SUSPENSION INTRA-ARTICULAR; INTRAMUSCULAR
Status: COMPLETED | OUTPATIENT
Start: 2025-05-06 | End: 2025-05-06

## 2025-05-06 RX ADMIN — TRIAMCINOLONE ACETONIDE 40 MG: 40 INJECTION, SUSPENSION INTRA-ARTICULAR; INTRAMUSCULAR at 15:18

## 2025-05-06 RX ADMIN — LIDOCAINE HYDROCHLORIDE 4 ML: 10 INJECTION, SOLUTION INFILTRATION; PERINEURAL at 15:18

## 2025-05-06 NOTE — PROGRESS NOTES
** Please excuse any errors in grammar or translation related to this dictation. Voice recognition software was utilized to prepare this document. **    Assessment & Plan:   Patient here for ongoing management of left knee arthritis. Responded well to CSI completed in December.   Non-operative treatment options reviewed below.  - Exercise program to strengthen supportive musculature.    - OTC analgesics, ice pack, heating pad as needed for intermittent symptoms.   - Steroid injection.  Elected to have completed today.  - Hyaluronic acid injection.  Durolane injection ineffective completed October 2024.  - Consider medial  bracing; pt declines today.   - We did discuss referral to joint replacement surgeon given OA severity but she does not feel she needs this yet.   Follow-up as needed for ongoing management. All questions answered and patient is agreeable to this plan.       Chief complaint:  Left knee pain    HPI:  5/6/25:  Patient follows up for left knee pain.  She reports the injection from 12/17 provided about 4 months relief.  Denies any new injuries.     12/17/24: Patient following up for left knee pain secondary to arthritis.  Patient reports the Durolane injection from 10/1 did not provide any relief.  Denies any new injuries.  She stays active performing HIIT exercises several times a week.  She continues to work part-time.    10/1/24: Patient following up for left knee pain.  Reports previous steroid injection helped for around 5 months.  Pain has been progressively increasing over the past 4 weeks.  Pain is most problematic when she is ambulatory.  Pain tends to localize the medial aspect of her knee.    4/25/24: Patient states she had about 4 months of relief with CSI. She is still having some relief post injection, but is leaving for a trip to [facility] and requests repeat injection to prevent pain while on vacation.    12/12/23: 72 y/o female patient, hx of right knee total arthroplasty in 2010,  presents with left knee pain.  This complaint has been ongoing for a year.  No mechanism of injury reported at onset. Symptoms have progressively worsened with time.  Pain is most prominent at medial knee.  Swelling present intermittently. It is associated with sensation of stiffness, loss of joint motion, crackling/popping sensation.   Symptoms are aggravated by sitting to standing, changing positions, knee flexion,. To date, patient has tried a variety of treatments to include Tylenol, tens unit, cortisone injection. Previously saw Shalom Stahl PA-C for this with last visit being 23. Last steroid injection completed 23. She wanted to pursue having a viscosupplement injection opposed to steroid and was referred here for this.     Patient Active Problem List   Diagnosis    Absolute anemia    Acquired abduction deformity of forefoot    Acquired claw toe    Agatston CAC score, <100    Arthritis, midfoot    Chronic insomnia    Decreased pedal pulses    Gait instability    Hallux valgus with bunions of right foot    Hyperlipidemia    Hypertension    Hypoestrogenism    Hypothyroidism    Left ankle pain    Pes planus of left foot    Primary osteoarthritis of left knee    Prolapse, uterovaginal    Right hip pain    Urge and stress incontinence    Vaginal erosion secondary to pessary use (CMS-[facility])    Valgus deformity, not elsewhere classified, unspecified ankle     Past Surgical History:   Procedure Laterality Date    BUNIONECTOMY      CARPAL TUNNEL RELEASE      CATARACT EXTRACTION  2016    Cataract Surgery     SECTION, LOW TRANSVERSE      HAND TENDON SURGERY  2016    Hand Incision Tendon Sheath Of A Finger    HYSTERECTOMY      JOINT REPLACEMENT      OOPHORECTOMY      OTHER SURGICAL HISTORY  2016    Prophylaxis Of Retinal Detachment    OTHER SURGICAL HISTORY  2016    Condylotomy Of TMJ    OTHER SURGICAL HISTORY  2016    Cervical Surgery (Gyn)    OTHER SURGICAL HISTORY   09/27/2021    Hysterectomy    TOE SURGERY      TOTAL KNEE ARTHROPLASTY  12/16/2016    Total Knee Arthroplasty    WISDOM TOOTH EXTRACTION  1978     Current Outpatient Medications on File Prior to Visit   Medication Sig Dispense Refill    amoxicillin (Amoxil) 500 mg capsule Take 4 capsules by mouth 1 hour prior to appointment 24 capsule 1    amoxicillin (Amoxil) 500 mg capsule 2 capsule by mouth right now, then 1 capsule three times a day until finished. 30 capsule 0    chlorthalidone (Hygroton) 50 mg tablet Take 1 tablet (50 mg) by mouth once daily. 90 tablet 1    cholecalciferol (Vitamin D3) 50 MCG (2000 UT) tablet Take 1 tablet (50 mcg) by mouth once daily.      choline fenofibrate (Trilipix) 45 mg DR capsule Take 1 capsule (45 mg) by mouth once daily. 90 capsule 1    ferrous gluconate (Fergon) 324 (38 Fe) mg tablet Take 1 tablet (38 mg of iron) by mouth 3 (three) times a week.      levothyroxine (Synthroid, Levoxyl) 75 mcg tablet Take 1 tablet (75 mcg) by mouth once daily in the morning. 90 tablet 2    magnesium 30 mg tablet Take 1 tablet (30 mg) by mouth 2 times a day.      potassium chloride CR (Klor-Con M20) 20 mEq ER tablet Take 1 tablet (20 mEq) by mouth once daily. Do not crush or chew. 90 tablet 1    rOPINIRole (Requip) 3 mg tablet Take 1 tablet (3 mg) by mouth 2 times a day. 180 tablet 1    rosuvastatin (Crestor) 10 mg tablet Take 1 tablet (10 mg) by mouth once daily. 90 tablet 2    traZODone (Desyrel) 150 mg tablet Take 1 tablet (150 mg) by mouth once daily at bedtime. 90 tablet 1     No current facility-administered medications on file prior to visit.       Exam:  General Exam:  Constitutional - NAD, AAO x 3, conversing appropriately.  HEENT- Normocephalic and atraumatic. No facial deformities. Hearing grossly normal.  Lungs - Breathing non-labored with normal rate. No accessory muscle use.  CV - Extremities warm and well-perfused, brisk capillary refill present.   Neuro - CN II-XII grossly  intact.    LEFT Knee examined. No effusion, ecchymosis, or erythema.  AROM from 5 to 120 deg with 5/5 strength. SILT overlying knee. Motion crepitus present. Medial joint line ttp.  No popliteal mass palpated. Negative anterior and posterior drawer.  Varus deformity correctable with valgus stress. No patellar apprehension.      Results:  Xrays of left knee obtained 12/19/24 personally interpreted as advanced medial, moderate lateral and PF compartment degenerative changes with joint space narrowing, osteophyte formation, and subchondral sclerosis. Varus alignment. Progression in disease compared to prior xray in 2022.     Lab Results   Component Value Date    HGBA1C 5.6 07/27/2024    CREATININE 0.70 07/27/2024    EGFR >90 07/27/2024     Procedure:  Patient ID: Amanda Lee is a 74 y.o. female.    L Inj/Asp: L knee on 5/6/2025 3:18 PM  Indications: pain  Details: 25 G needle, anterolateral approach  Medications: 40 mg triamcinolone acetonide 40 mg/mL; 4 mL lidocaine 10 mg/mL (1 %)  Outcome: tolerated well, no immediate complications    Procedure risk factors to include increased pain, bleeding, infection, neurovascular injury, soft tissue injury, progressive cartilage loss, transient elevation of blood glucose and blood pressure, and adverse reaction to medication were discussed with the patient. Patient understands there is a moderate risk of morbidity from undergoing the procedure.    Procedure, treatment alternatives, risks and benefits explained, specific risks discussed. Consent was given by the patient. Immediately prior to procedure a time out was called to verify the correct patient, procedure, equipment, support staff and site/side marked as required. Patient was prepped and draped in the usual sterile fashion.

## 2025-05-12 ENCOUNTER — APPOINTMENT (OUTPATIENT)
Dept: PHYSICAL THERAPY | Facility: CLINIC | Age: 75
End: 2025-05-12
Payer: COMMERCIAL

## 2025-05-27 ENCOUNTER — APPOINTMENT (OUTPATIENT)
Dept: PHYSICAL THERAPY | Facility: CLINIC | Age: 75
End: 2025-05-27
Payer: COMMERCIAL

## 2025-06-09 PROCEDURE — RXMED WILLOW AMBULATORY MEDICATION CHARGE

## 2025-06-12 ENCOUNTER — PHARMACY VISIT (OUTPATIENT)
Dept: PHARMACY | Facility: CLINIC | Age: 75
End: 2025-06-12
Payer: COMMERCIAL

## 2025-07-07 DIAGNOSIS — E03.9 ACQUIRED HYPOTHYROIDISM: ICD-10-CM

## 2025-07-07 PROCEDURE — RXMED WILLOW AMBULATORY MEDICATION CHARGE

## 2025-07-07 RX ORDER — LEVOTHYROXINE SODIUM 75 UG/1
75 TABLET ORAL EVERY MORNING
Qty: 90 TABLET | Refills: 0 | Status: SHIPPED | OUTPATIENT
Start: 2025-07-07

## 2025-07-09 ENCOUNTER — PHARMACY VISIT (OUTPATIENT)
Dept: PHARMACY | Facility: CLINIC | Age: 75
End: 2025-07-09
Payer: COMMERCIAL

## 2025-07-18 ENCOUNTER — LAB (OUTPATIENT)
Dept: LAB | Facility: HOSPITAL | Age: 75
End: 2025-07-18
Payer: COMMERCIAL

## 2025-07-19 LAB
25(OH)D3+25(OH)D2 SERPL-MCNC: 98 NG/ML (ref 30–100)
ALBUMIN SERPL-MCNC: 4.1 G/DL (ref 3.6–5.1)
ALP SERPL-CCNC: 53 U/L (ref 37–153)
ALT SERPL-CCNC: 17 U/L (ref 6–29)
ANION GAP SERPL CALCULATED.4IONS-SCNC: 9 MMOL/L (CALC) (ref 7–17)
AST SERPL-CCNC: 24 U/L (ref 10–35)
BILIRUB SERPL-MCNC: 0.8 MG/DL (ref 0.2–1.2)
BUN SERPL-MCNC: 21 MG/DL (ref 7–25)
CALCIUM SERPL-MCNC: 9.9 MG/DL (ref 8.6–10.4)
CHLORIDE SERPL-SCNC: 102 MMOL/L (ref 98–110)
CO2 SERPL-SCNC: 28 MMOL/L (ref 20–32)
CREAT SERPL-MCNC: 0.73 MG/DL (ref 0.6–1)
EGFRCR SERPLBLD CKD-EPI 2021: 86 ML/MIN/1.73M2
ERYTHROCYTE [DISTWIDTH] IN BLOOD BY AUTOMATED COUNT: 12.4 % (ref 11–15)
EST. AVERAGE GLUCOSE BLD GHB EST-MCNC: 120 MG/DL
EST. AVERAGE GLUCOSE BLD GHB EST-SCNC: 6.6 MMOL/L
GLUCOSE SERPL-MCNC: 100 MG/DL (ref 65–99)
HBA1C MFR BLD: 5.8 %
HCT VFR BLD AUTO: 43.7 % (ref 35–45)
HGB BLD-MCNC: 14.2 G/DL (ref 11.7–15.5)
MCH RBC QN AUTO: 28.7 PG (ref 27–33)
MCHC RBC AUTO-ENTMCNC: 32.5 G/DL (ref 32–36)
MCV RBC AUTO: 88.3 FL (ref 80–100)
PLATELET # BLD AUTO: 306 THOUSAND/UL (ref 140–400)
PMV BLD REES-ECKER: 9 FL (ref 7.5–12.5)
POTASSIUM SERPL-SCNC: 4.2 MMOL/L (ref 3.5–5.3)
PROT SERPL-MCNC: 6.8 G/DL (ref 6.1–8.1)
RBC # BLD AUTO: 4.95 MILLION/UL (ref 3.8–5.1)
SODIUM SERPL-SCNC: 139 MMOL/L (ref 135–146)
TSH SERPL-ACNC: 2.07 MIU/L (ref 0.4–4.5)
VIT B12 SERPL-MCNC: 466 PG/ML (ref 200–1100)
WBC # BLD AUTO: 5.4 THOUSAND/UL (ref 3.8–10.8)

## 2025-07-20 LAB
ALBUMIN/CREAT UR: NORMAL
CHOLEST SERPL-MCNC: 168 MG/DL
CHOLEST/HDLC SERPL: 2.8 (CALC)
CREAT UR-MCNC: NORMAL MG/DL
HDLC SERPL-MCNC: 60 MG/DL
LDLC SERPL CALC-MCNC: 87 MG/DL (CALC)
MICROALBUMIN UR-MCNC: NORMAL
NONHDLC SERPL-MCNC: 108 MG/DL (CALC)
TRIGL SERPL-MCNC: 109 MG/DL

## 2025-07-21 LAB
ALBUMIN/CREAT UR: 32 MG/G CREAT
CHOLEST SERPL-MCNC: 168 MG/DL
CHOLEST/HDLC SERPL: 2.8 (CALC)
CREAT UR-MCNC: 44 MG/DL (ref 20–275)
HDLC SERPL-MCNC: 60 MG/DL
LDLC SERPL CALC-MCNC: 87 MG/DL (CALC)
MICROALBUMIN UR-MCNC: 1.4 MG/DL
NONHDLC SERPL-MCNC: 108 MG/DL (CALC)
TRIGL SERPL-MCNC: 109 MG/DL

## 2025-07-22 ENCOUNTER — APPOINTMENT (OUTPATIENT)
Dept: PRIMARY CARE | Facility: CLINIC | Age: 75
End: 2025-07-22
Payer: COMMERCIAL

## 2025-07-22 VITALS
SYSTOLIC BLOOD PRESSURE: 158 MMHG | HEART RATE: 63 BPM | BODY MASS INDEX: 24.75 KG/M2 | WEIGHT: 154 LBS | DIASTOLIC BLOOD PRESSURE: 77 MMHG | OXYGEN SATURATION: 96 % | HEIGHT: 66 IN

## 2025-07-22 DIAGNOSIS — I10 PRIMARY HYPERTENSION: ICD-10-CM

## 2025-07-22 DIAGNOSIS — E87.6 HYPOKALEMIA: ICD-10-CM

## 2025-07-22 DIAGNOSIS — E78.2 MIXED HYPERLIPIDEMIA: ICD-10-CM

## 2025-07-22 DIAGNOSIS — Z12.31 VISIT FOR SCREENING MAMMOGRAM: ICD-10-CM

## 2025-07-22 DIAGNOSIS — E03.9 ACQUIRED HYPOTHYROIDISM: ICD-10-CM

## 2025-07-22 DIAGNOSIS — Z00.00 ANNUAL PHYSICAL EXAM: Primary | ICD-10-CM

## 2025-07-22 DIAGNOSIS — R73.9 ELEVATED BLOOD SUGAR: ICD-10-CM

## 2025-07-22 DIAGNOSIS — Z23 ENCOUNTER FOR IMMUNIZATION: ICD-10-CM

## 2025-07-22 DIAGNOSIS — N95.8 OTHER SPECIFIED MENOPAUSAL AND PERIMENOPAUSAL DISORDERS: ICD-10-CM

## 2025-07-22 DIAGNOSIS — G25.81 RLS (RESTLESS LEGS SYNDROME): ICD-10-CM

## 2025-07-22 DIAGNOSIS — F51.04 CHRONIC INSOMNIA: ICD-10-CM

## 2025-07-22 PROCEDURE — 1159F MED LIST DOCD IN RCRD: CPT | Performed by: FAMILY MEDICINE

## 2025-07-22 PROCEDURE — 3077F SYST BP >= 140 MM HG: CPT | Performed by: FAMILY MEDICINE

## 2025-07-22 PROCEDURE — 3078F DIAST BP <80 MM HG: CPT | Performed by: FAMILY MEDICINE

## 2025-07-22 PROCEDURE — 90679 RSV VACC PREF RECOMB ADJT IM: CPT | Performed by: FAMILY MEDICINE

## 2025-07-22 PROCEDURE — 1160F RVW MEDS BY RX/DR IN RCRD: CPT | Performed by: FAMILY MEDICINE

## 2025-07-22 PROCEDURE — 1036F TOBACCO NON-USER: CPT | Performed by: FAMILY MEDICINE

## 2025-07-22 PROCEDURE — 99397 PER PM REEVAL EST PAT 65+ YR: CPT | Performed by: FAMILY MEDICINE

## 2025-07-22 PROCEDURE — RXMED WILLOW AMBULATORY MEDICATION CHARGE

## 2025-07-22 PROCEDURE — 90471 IMMUNIZATION ADMIN: CPT | Performed by: FAMILY MEDICINE

## 2025-07-22 RX ORDER — TRAZODONE HYDROCHLORIDE 150 MG/1
150 TABLET ORAL NIGHTLY
Qty: 90 TABLET | Refills: 1 | Status: SHIPPED | OUTPATIENT
Start: 2025-07-22

## 2025-07-22 RX ORDER — ROPINIROLE 3 MG/1
3 TABLET, FILM COATED ORAL 2 TIMES DAILY
Qty: 180 TABLET | Refills: 1 | Status: SHIPPED | OUTPATIENT
Start: 2025-07-22

## 2025-07-22 RX ORDER — FENOFIBRIC ACID 45 MG/1
45 CAPSULE, DELAYED RELEASE ORAL DAILY
Qty: 90 CAPSULE | Refills: 1 | Status: SHIPPED | OUTPATIENT
Start: 2025-07-22

## 2025-07-22 RX ORDER — LEVOTHYROXINE SODIUM 75 UG/1
75 TABLET ORAL EVERY MORNING
Qty: 90 TABLET | Refills: 1 | Status: SHIPPED | OUTPATIENT
Start: 2025-07-22

## 2025-07-22 RX ORDER — CHLORTHALIDONE 50 MG/1
50 TABLET ORAL DAILY
Qty: 90 TABLET | Refills: 1 | Status: SHIPPED | OUTPATIENT
Start: 2025-07-22

## 2025-07-22 RX ORDER — POTASSIUM CHLORIDE 20 MEQ/1
20 TABLET, EXTENDED RELEASE ORAL DAILY
Qty: 90 TABLET | Refills: 1 | Status: SHIPPED | OUTPATIENT
Start: 2025-07-22

## 2025-07-22 RX ORDER — ROSUVASTATIN CALCIUM 10 MG/1
10 TABLET, COATED ORAL DAILY
Qty: 90 TABLET | Refills: 1 | Status: SHIPPED | OUTPATIENT
Start: 2025-07-22

## 2025-07-22 ASSESSMENT — COLUMBIA-SUICIDE SEVERITY RATING SCALE - C-SSRS
2. HAVE YOU ACTUALLY HAD ANY THOUGHTS OF KILLING YOURSELF?: NO
1. IN THE PAST MONTH, HAVE YOU WISHED YOU WERE DEAD OR WISHED YOU COULD GO TO SLEEP AND NOT WAKE UP?: NO

## 2025-07-22 NOTE — PROGRESS NOTES
"Subjective   Patient ID: Amanda Lee is a 75 y.o. female who presents for Annual Exam.        Last Annual Physical: July 2024  Last Dental Visit: Up-to-date  Last Eye exam: Twice a year  Hearing Concerns: no  Diet: well-balanced  Exercise: Regular       HPI       The patient reports that she is taking rosuvastatin and fenofibrate for hyperlipidemia, levothyroxine for hypothyroidism, trazodone for insomnia, chlorthalidone for hypertension Requip for restless legs and trospium for stress incontinence. She is taking these medications as prescribed and denies having side effects from them. Also takes vitamin D, ferrous gluconate, magnesium, potassium. Her depression is stable without medications at this time. She had an episode of diverticulitis that required an ER visit in 2024. She lives by herself.     Blood pressure is high in clinic today.  She states she had coffee prior to the appointment today and her home BPs are normally ranged.  Denies any falls in the last year. No depression over the last few weeks. She is doing well without any complaints or concerns.     Labs reviewed. A1c gone up to 5.8 from 5.6.  Vitamin D 98, very high.  Otherwise all labs were normal.     Review of Systems  Constitutional: No fever or chills, No Night Sweats  Eyes: No Blurry Vision + legally blind in left eye Eye sight problems  ENT: No Nasal Discharge, Hoarseness, sore throat  Cardiovascular: no chest pain, no palpitations and no syncope.   Respiratory: no cough, no shortness of breath during exertion and no shortness of breath at rest.   Gastrointestinal: no abdominal pain, no nausea and no vomiting.   : No vaginal discharge, burning with urination, no blood in urine or stools  Skin: No Skin rashes or Lesions  Neuro: No Headache, no dizziness or Numbness or tingling  Psych: No Anxiety, depression or sleeping problems  Heme: No Easy bleeding or brusing.     Objective   /77   Pulse 63   Ht 1.664 m (5' 5.5\")   Wt 69.9 kg " (154 lb)   SpO2 96%   BMI 25.24 kg/m²     Physical Exam  Constitutional: Alert and in no acute distress. Well developed, well nourished.   Head and Face: Head and face: Normal.    Eyes: Normal external exam. Pupils were equal in size, round, reactive to light (PERRL) with normal accommodation and extraocular movements intact (EOMI).   Ears, Nose, Mouth, and Throat: External inspection of ears and nose: Normal.  Hearing: Normal.  Nasal mucosa, septum, and turbinates: Normal.  Lips, teeth, and gums: Normal.  Oropharynx: Normal.   Neck: No neck mass was observed. Supple. Thyroid not enlarged and there were no palpable thyroid nodules.   Cardiovascular: Heart rate and rhythm were normal, normal S1 and S2. Pedal pulses: Normal. No peripheral edema.   Pulmonary: No respiratory distress. Clear bilateral breath sounds.   Breast: Normal Appearance, No Masses or lumps palpated  Abdomen: Soft nontender; no abdominal mass palpated. Normal bowel sounds. No organomegaly.   : Deferred   Musculoskeletal: No joint swelling seen, normal movements of all extremities. Range of motion: Normal.  Muscle strength/tone: Normal.    Skin: Normal skin color and pigmentation, normal skin turgor, and no rash.   Neurologic: Deep tendon reflexes were 2+ and symmetric.   Psychiatric: Judgment and insight: Intact. Mood and affect: Normal.  Lymphatic: No cervical lymphadenopathy. Palpation of lymph nodes in axillae: Normal.  Palpation of lymph nodes in groin: Normal.    Lab Results   Component Value Date    WBC 5.4 07/18/2025    HGB 14.2 07/18/2025    HCT 43.7 07/18/2025     07/18/2025    CHOL 168 07/19/2025    TRIG 109 07/19/2025    HDL 60 07/19/2025    ALT 17 07/18/2025    AST 24 07/18/2025     07/18/2025    K 4.2 07/18/2025     07/18/2025    CREATININE 0.73 07/18/2025    BUN 21 07/18/2025    CO2 28 07/18/2025    TSH 2.07 07/18/2025    INR 1.0 05/16/2018    HGBA1C 5.8 (H) 07/18/2025       BI mammo bilateral screening  tomosynthesis  Narrative: Interpreted By:  Miles Rogel,   STUDY:  BI MAMMO BILATERAL SCREENING TOMOSYNTHESIS;  1/9/2025 4:59 pm      ACCESSION NUMBER(S):  [id number]      ORDERING CLINICIAN:  KEON SCHMID      INDICATION:  Screening. Patient has a family history of breast cancer.      ,Z12.31 Encounter for screening mammogram for malignant neoplasm of  breast      COMPARISON:  01/08/2024, 12/27/2022, 12/20/2021      FINDINGS:  2D and tomosynthesis images were reviewed at 1 mm slice thickness.      Density:  There are scattered areas of fibroglandular density.      No suspicious masses or calcifications are identified.      Impression: No mammographic evidence of malignancy.      BI-RADS CATEGORY:  BI-RADS Category:  1 Negative.  Recommendation:  Annual Screening.  Recommended Date:  1 Year.  Laterality:  Bilateral.              For any future breast imaging appointments, please call [phone] ([phone]).          MACRO:  None      Signed by: Miles Rogel 1/13/2025 4:36 PM  Dictation workstation:   [id number]      Assessment/Plan   Diagnoses and all orders for this visit:  Annual physical exam  Primary hypertension  -     Follow Up In [facility] - PCP - Health Maintenance  -     chlorthalidone (Hygroton) 50 mg tablet; Take 1 tablet (50 mg) by mouth once daily.  -     Follow Up In [facility] - PCP - Established; Future  -     Comprehensive Metabolic Panel; Future  Mixed hyperlipidemia  -     choline fenofibrate (Trilipix) 45 mg DR capsule; Take 1 capsule (45 mg) by mouth once daily.  -     rosuvastatin (Crestor) 10 mg tablet; Take 1 tablet (10 mg) by mouth once daily.  -     Lipid Panel; Future  Acquired hypothyroidism  -     levothyroxine (Synthroid, Levoxyl) 75 mcg tablet; Take 1 tablet (75 mcg) by mouth once daily in the morning.  Chronic insomnia  -     traZODone (Desyrel) 150 mg tablet; Take 1 tablet (150 mg) by mouth once daily at bedtime.  RLS (restless legs syndrome)  -     rOPINIRole  (Requip) 3 mg tablet; Take 1 tablet (3 mg) by mouth 2 times a day.  Hypokalemia  -     potassium chloride CR (Klor-Con M20) 20 mEq ER tablet; Take 1 tablet (20 mEq) by mouth once daily. Do not crush or chew.  Encounter for immunization  -     RSV, Recombinant, 60 years and older (AREXVY)  Visit for screening mammogram  -     BI mammo bilateral screening tomosynthesis; Future  Other specified menopausal and perimenopausal disorders  -     XR DEXA bone density; Future  Elevated blood sugar  -     Hemoglobin A1C; Future        Dear Amanda Lee     It was my pleasure to take care of you today in the office. Below are the things we discussed today:    1. Immunizations: Yearly Flu shot is recommended. Up-to-date         a: COVID: Declined Booster         b: Tetanus: Up-to-date. Done in 2023          c: Shingrix: Up-to-date         e: Prevnar: Up-to-date         F. RSV: Taken today     2. Blood Work: Reviewed   3. Seen your dentist twice a year  4. Yearly Eye exam is recommended     5. BMI: Overweight   6: Diet recommendations:   Eat Clean, Try to have as many home cooked meals as possible  Avoid processed foods which contain excess calories, sugar, and sodium.     7. Exercise recommendations:   150 minutes a week to maintain your weight      If you have to lose weight, you need a better diet and exercise plan.      8. Supplements recommended:  a - Calcium 600 mg up to twice a day to get a total of 1200 mg. Each 8 oz of milk or yogurt or 1 oz of cheese, 1 Banana, 1 serving of green Leafy vegetable has about 300 mg of Calcium, so you may subtract that amount. Calcium citrate is the only acceptable supplement to take if you take an acid suppressing medication like Prilosec; otherwise Calcium carbonate is acceptable too (It can cause Constipation).   b - Vitamin D - 2000 IU daily      9. Please get your Living will / Advance directive completed if you do not have one already. Please make sure our office has a copy of the  latest one.      10. Colonoscopy: Uptodate. Last done in Oct 2021, repeat in Oct 2026   11. Mammogram: Uptodate. Ordered for Jan 2026   12. PAP: Not indicated   13. DEXA: Up-to-date, done Aug 2023. Ordered.    14: Skin Check: Please see Dermatology once a year for a Skin Check.      15. Hyperlipidemia: Cholesterol numbers are good.  Continue rosuvastatin and fenofibrate.      16. Hypertension: Continue chlorthalidone.     17. RLS: Continue Requip.      18. Hypothyroidism: Continue levothyroxine.     19. Insomnia: Continue trazodone.      20.  Vitamin D 98, very high.  Encouraged to cut down on vitamin D supplements, hold for a month and take one every other day thereafter.     Follow up in 6 months.    Follow up in one year for a Physical. Please call the office before your Physical to see if you need blood work completed prior to your physical.     Please call me if any questions arise from now until your next visit. I will call you after I am done seeing patients. A Doctor is always available by phone when the office is closed. Please feel free to call for help with any problem that you feel shouldn't wait until the office re-opens.     I, Kathryn Dunham MD, attest that this note for 7/22/2025 accurately reflects documentation that my scribe Tatiana Bertrand, made at my direction in my capacity as Kathryn Dunham MD when I treated Amanda Lee.    Scribe Attestation  By signing my name below, ITatiana Scribe   attest that this documentation has been prepared under the direction and in the presence of Kathryn Dunham MD.

## 2025-07-23 ENCOUNTER — PHARMACY VISIT (OUTPATIENT)
Dept: PHARMACY | Facility: CLINIC | Age: 75
End: 2025-07-23
Payer: COMMERCIAL

## 2025-07-24 ENCOUNTER — PHARMACY VISIT (OUTPATIENT)
Dept: PHARMACY | Facility: CLINIC | Age: 75
End: 2025-07-24
Payer: COMMERCIAL

## 2025-07-25 ENCOUNTER — PHARMACY VISIT (OUTPATIENT)
Dept: PHARMACY | Facility: CLINIC | Age: 75
End: 2025-07-25
Payer: COMMERCIAL

## 2025-07-25 PROCEDURE — RXMED WILLOW AMBULATORY MEDICATION CHARGE

## 2025-08-04 ENCOUNTER — APPOINTMENT (OUTPATIENT)
Dept: RADIOLOGY | Facility: HOSPITAL | Age: 75
End: 2025-08-04
Payer: COMMERCIAL

## 2025-08-04 DIAGNOSIS — N95.8 OTHER SPECIFIED MENOPAUSAL AND PERIMENOPAUSAL DISORDERS: ICD-10-CM

## 2025-08-04 PROCEDURE — 77080 DXA BONE DENSITY AXIAL: CPT | Performed by: RADIOLOGY

## 2025-08-04 PROCEDURE — 77080 DXA BONE DENSITY AXIAL: CPT

## 2025-12-29 ENCOUNTER — APPOINTMENT (OUTPATIENT)
Dept: OBSTETRICS AND GYNECOLOGY | Facility: CLINIC | Age: 75
End: 2025-12-29
Payer: COMMERCIAL

## 2026-01-27 ENCOUNTER — APPOINTMENT (OUTPATIENT)
Dept: PRIMARY CARE | Facility: CLINIC | Age: 76
End: 2026-01-27
Payer: COMMERCIAL